# Patient Record
Sex: MALE | Race: WHITE | NOT HISPANIC OR LATINO
[De-identification: names, ages, dates, MRNs, and addresses within clinical notes are randomized per-mention and may not be internally consistent; named-entity substitution may affect disease eponyms.]

---

## 2017-09-28 ENCOUNTER — BH HISTORICAL (OUTPATIENT)
Dept: OTHER | Age: 62
End: 2017-09-28

## 2017-10-05 ENCOUNTER — BH HISTORICAL (OUTPATIENT)
Dept: OTHER | Age: 62
End: 2017-10-05

## 2017-10-16 ENCOUNTER — BH HISTORICAL (OUTPATIENT)
Dept: OTHER | Age: 62
End: 2017-10-16

## 2017-10-24 ENCOUNTER — BH HISTORICAL (OUTPATIENT)
Dept: OTHER | Age: 62
End: 2017-10-24

## 2017-11-02 ENCOUNTER — BH HISTORICAL (OUTPATIENT)
Dept: OTHER | Age: 62
End: 2017-11-02

## 2017-11-09 ENCOUNTER — BH HISTORICAL (OUTPATIENT)
Dept: OTHER | Age: 62
End: 2017-11-09

## 2017-11-16 ENCOUNTER — BH HISTORICAL (OUTPATIENT)
Dept: OTHER | Age: 62
End: 2017-11-16

## 2017-11-28 ENCOUNTER — BH HISTORICAL (OUTPATIENT)
Dept: OTHER | Age: 62
End: 2017-11-28

## 2017-12-14 ENCOUNTER — BH HISTORICAL (OUTPATIENT)
Dept: OTHER | Age: 62
End: 2017-12-14

## 2019-07-23 ENCOUNTER — OFFICE VISIT (OUTPATIENT)
Dept: INTEGRATIVE MEDICINE | Facility: CLINIC | Age: 64
End: 2019-07-23
Payer: COMMERCIAL

## 2019-07-23 VITALS
HEIGHT: 68 IN | SYSTOLIC BLOOD PRESSURE: 178 MMHG | WEIGHT: 225 LBS | BODY MASS INDEX: 34.1 KG/M2 | OXYGEN SATURATION: 98 % | HEART RATE: 84 BPM | DIASTOLIC BLOOD PRESSURE: 100 MMHG

## 2019-07-23 DIAGNOSIS — E11.9 TYPE 2 DIABETES MELLITUS WITHOUT COMPLICATION, WITHOUT LONG-TERM CURRENT USE OF INSULIN (HCC): ICD-10-CM

## 2019-07-23 DIAGNOSIS — I10 HYPERTENSION, ESSENTIAL: Primary | ICD-10-CM

## 2019-07-23 DIAGNOSIS — H53.9 VISION CHANGES: ICD-10-CM

## 2019-07-23 DIAGNOSIS — E78.2 MIXED HYPERLIPIDEMIA: ICD-10-CM

## 2019-07-23 DIAGNOSIS — K21.9 GASTROESOPHAGEAL REFLUX DISEASE, ESOPHAGITIS PRESENCE NOT SPECIFIED: ICD-10-CM

## 2019-07-23 DIAGNOSIS — H43.399 VITREOUS FLOATERS, UNSPECIFIED LATERALITY: ICD-10-CM

## 2019-07-23 DIAGNOSIS — F32.A DEPRESSION, UNSPECIFIED DEPRESSION TYPE: ICD-10-CM

## 2019-07-23 DIAGNOSIS — E66.9 OBESITY (BMI 30-39.9): ICD-10-CM

## 2019-07-23 PROCEDURE — 99205 OFFICE O/P NEW HI 60 MIN: CPT | Performed by: FAMILY MEDICINE

## 2019-07-23 RX ORDER — DAPAGLIFLOZIN 5 MG/1
5 TABLET, FILM COATED ORAL
Qty: 90 TABLET | Refills: 0 | Status: SHIPPED | OUTPATIENT
Start: 2019-07-23 | End: 2019-12-19

## 2019-07-23 RX ORDER — AMLODIPINE BESYLATE AND BENAZEPRIL HYDROCHLORIDE 5; 20 MG/1; MG/1
CAPSULE ORAL
Refills: 1 | COMMUNITY
Start: 2019-04-04 | End: 2019-07-23

## 2019-07-23 RX ORDER — METOPROLOL SUCCINATE 25 MG/1
25 TABLET, EXTENDED RELEASE ORAL DAILY
COMMUNITY
End: 2019-07-23

## 2019-07-23 RX ORDER — DAPAGLIFLOZIN 5 MG/1
TABLET, FILM COATED ORAL
Refills: 3 | COMMUNITY
Start: 2019-01-27 | End: 2019-07-23

## 2019-07-23 RX ORDER — METOPROLOL SUCCINATE 25 MG/1
25 TABLET, EXTENDED RELEASE ORAL DAILY
Qty: 90 TABLET | Refills: 0 | Status: SHIPPED | OUTPATIENT
Start: 2019-07-23 | End: 2019-10-08

## 2019-07-23 RX ORDER — GLIPIZIDE 10 MG/1
10 TABLET, FILM COATED, EXTENDED RELEASE ORAL DAILY
Qty: 90 TABLET | Refills: 0 | Status: SHIPPED | OUTPATIENT
Start: 2019-07-23 | End: 2019-10-08

## 2019-07-23 RX ORDER — SERTRALINE HYDROCHLORIDE 100 MG/1
150 TABLET, FILM COATED ORAL DAILY
Qty: 135 TABLET | Refills: 0 | Status: SHIPPED | OUTPATIENT
Start: 2019-07-23 | End: 2019-10-08

## 2019-07-23 RX ORDER — RANITIDINE 150 MG/1
150 TABLET ORAL 2 TIMES DAILY
COMMUNITY
End: 2019-07-23

## 2019-07-23 RX ORDER — BUPROPION HYDROCHLORIDE 150 MG/1
150 TABLET, EXTENDED RELEASE ORAL 2 TIMES DAILY
Qty: 180 TABLET | Refills: 0 | Status: SHIPPED | OUTPATIENT
Start: 2019-07-23 | End: 2019-10-08

## 2019-07-23 RX ORDER — SERTRALINE HYDROCHLORIDE 100 MG/1
100 TABLET, FILM COATED ORAL DAILY
COMMUNITY
End: 2019-07-23

## 2019-07-23 RX ORDER — RANITIDINE 150 MG/1
150 TABLET ORAL
Qty: 90 TABLET | Refills: 0 | Status: SHIPPED | OUTPATIENT
Start: 2019-07-23 | End: 2020-06-17

## 2019-07-23 RX ORDER — BUPROPION HYDROCHLORIDE 150 MG/1
150 TABLET, EXTENDED RELEASE ORAL 2 TIMES DAILY
COMMUNITY
End: 2019-07-23

## 2019-07-23 RX ORDER — AMLODIPINE BESYLATE AND BENAZEPRIL HYDROCHLORIDE 5; 20 MG/1; MG/1
1 CAPSULE ORAL DAILY
Qty: 90 CAPSULE | Refills: 1 | Status: SHIPPED | OUTPATIENT
Start: 2019-07-23 | End: 2019-10-08

## 2019-07-23 RX ORDER — ROSUVASTATIN CALCIUM 20 MG/1
20 TABLET, COATED ORAL NIGHTLY
COMMUNITY
End: 2019-07-23

## 2019-07-23 RX ORDER — GLIPIZIDE 10 MG/1
10 TABLET, FILM COATED, EXTENDED RELEASE ORAL DAILY
COMMUNITY
End: 2019-07-23

## 2019-07-23 RX ORDER — ROSUVASTATIN CALCIUM 20 MG/1
20 TABLET, COATED ORAL NIGHTLY
Qty: 90 TABLET | Refills: 0 | Status: SHIPPED | OUTPATIENT
Start: 2019-07-23 | End: 2019-10-08

## 2019-07-23 NOTE — PROGRESS NOTES
Shoaib Gramajo is a 62year old male. Patient presents with: Integrative Medicine Consult      HPI:     Retired for the past 2 years  Does gardening, traveling  Having more depression recently, has stress with his marriage, his wife is sick with MS.     Ra buPROPion HCl ER, SR, 150 MG Oral Tablet 12 Hr Take 1 tablet (150 mg total) by mouth 2 (two) times daily. Disp: 180 tablet Rfl: 0   Sertraline HCl 100 MG Oral Tab Take 1.5 tablets (150 mg total) by mouth daily.  Disp: 135 tablet Rfl: 0   Rosuvastatin Calciu Forced sexual activity: Not on file    Other Topics      Concerns:        Not on file    Social History Narrative      Retired            Daughter - Dior      SURGICAL HISTORY:     Past Surgical History:   Procedure Laterality Date   • Cath Given further recommendations as below    Orders Placed This Visit:  No orders of the defined types were placed in this encounter.     Orders Placed This Encounter      **MEDICAL NUTRITIONAL THERAPY ([place] Location) - INTERNAL REFERRAL**      [facility]

## 2019-10-08 ENCOUNTER — OFFICE VISIT (OUTPATIENT)
Dept: INTEGRATIVE MEDICINE | Facility: CLINIC | Age: 64
End: 2019-10-08
Payer: COMMERCIAL

## 2019-10-08 ENCOUNTER — LAB ENCOUNTER (OUTPATIENT)
Dept: LAB | Facility: REFERENCE LAB | Age: 64
End: 2019-10-08
Attending: FAMILY MEDICINE
Payer: COMMERCIAL

## 2019-10-08 VITALS
OXYGEN SATURATION: 97 % | WEIGHT: 223.63 LBS | SYSTOLIC BLOOD PRESSURE: 122 MMHG | DIASTOLIC BLOOD PRESSURE: 76 MMHG | HEIGHT: 68 IN | HEART RATE: 74 BPM | BODY MASS INDEX: 33.89 KG/M2

## 2019-10-08 DIAGNOSIS — Z00.00 ROUTINE MEDICAL EXAM: ICD-10-CM

## 2019-10-08 DIAGNOSIS — K21.9 GASTROESOPHAGEAL REFLUX DISEASE, ESOPHAGITIS PRESENCE NOT SPECIFIED: ICD-10-CM

## 2019-10-08 DIAGNOSIS — E78.2 MIXED HYPERLIPIDEMIA: ICD-10-CM

## 2019-10-08 DIAGNOSIS — F32.A DEPRESSION, UNSPECIFIED DEPRESSION TYPE: ICD-10-CM

## 2019-10-08 DIAGNOSIS — L81.9 ATYPICAL PIGMENTED SKIN LESION: ICD-10-CM

## 2019-10-08 DIAGNOSIS — Z99.89 OSA ON CPAP: ICD-10-CM

## 2019-10-08 DIAGNOSIS — E66.9 OBESITY (BMI 30-39.9): ICD-10-CM

## 2019-10-08 DIAGNOSIS — E11.9 TYPE 2 DIABETES MELLITUS WITHOUT COMPLICATION, WITHOUT LONG-TERM CURRENT USE OF INSULIN (HCC): ICD-10-CM

## 2019-10-08 DIAGNOSIS — Z12.11 SCREEN FOR COLON CANCER: ICD-10-CM

## 2019-10-08 DIAGNOSIS — Z00.00 ROUTINE MEDICAL EXAM: Primary | ICD-10-CM

## 2019-10-08 DIAGNOSIS — I10 HYPERTENSION, ESSENTIAL: ICD-10-CM

## 2019-10-08 DIAGNOSIS — G47.33 OSA ON CPAP: ICD-10-CM

## 2019-10-08 PROBLEM — Z79.82 LONG TERM CURRENT USE OF ASPIRIN: Status: ACTIVE | Noted: 2019-01-21

## 2019-10-08 PROBLEM — K63.5 POLYP OF COLON: Status: ACTIVE | Noted: 2019-01-21

## 2019-10-08 PROBLEM — L57.0 ACTINIC KERATOSIS: Status: ACTIVE | Noted: 2019-01-21

## 2019-10-08 PROBLEM — I25.9 CHRONIC ISCHEMIC HEART DISEASE: Status: ACTIVE | Noted: 2019-01-21

## 2019-10-08 PROBLEM — F41.9 ANXIETY: Status: ACTIVE | Noted: 2019-01-21

## 2019-10-08 PROBLEM — F10.11 NONDEPENDENT ALCOHOL ABUSE, IN REMISSION: Status: ACTIVE | Noted: 2019-01-21

## 2019-10-08 PROCEDURE — 90471 IMMUNIZATION ADMIN: CPT | Performed by: FAMILY MEDICINE

## 2019-10-08 PROCEDURE — 36415 COLL VENOUS BLD VENIPUNCTURE: CPT

## 2019-10-08 PROCEDURE — 99396 PREV VISIT EST AGE 40-64: CPT | Performed by: FAMILY MEDICINE

## 2019-10-08 PROCEDURE — 80061 LIPID PANEL: CPT

## 2019-10-08 PROCEDURE — 84153 ASSAY OF PSA TOTAL: CPT

## 2019-10-08 PROCEDURE — 83036 HEMOGLOBIN GLYCOSYLATED A1C: CPT

## 2019-10-08 PROCEDURE — 83735 ASSAY OF MAGNESIUM: CPT

## 2019-10-08 PROCEDURE — 80053 COMPREHEN METABOLIC PANEL: CPT

## 2019-10-08 PROCEDURE — 84154 ASSAY OF PSA FREE: CPT

## 2019-10-08 PROCEDURE — 82306 VITAMIN D 25 HYDROXY: CPT

## 2019-10-08 PROCEDURE — 84443 ASSAY THYROID STIM HORMONE: CPT

## 2019-10-08 PROCEDURE — 90686 IIV4 VACC NO PRSV 0.5 ML IM: CPT | Performed by: FAMILY MEDICINE

## 2019-10-08 PROCEDURE — 85025 COMPLETE CBC W/AUTO DIFF WBC: CPT

## 2019-10-08 RX ORDER — BUPROPION HYDROCHLORIDE 150 MG/1
150 TABLET, EXTENDED RELEASE ORAL 2 TIMES DAILY
Qty: 180 TABLET | Refills: 1 | Status: SHIPPED | OUTPATIENT
Start: 2019-10-08 | End: 2020-03-23

## 2019-10-08 RX ORDER — ROSUVASTATIN CALCIUM 20 MG/1
20 TABLET, COATED ORAL NIGHTLY
Qty: 90 TABLET | Refills: 1 | Status: SHIPPED | OUTPATIENT
Start: 2019-10-08 | End: 2019-12-09

## 2019-10-08 RX ORDER — FENOFIBRATE 145 MG/1
145 TABLET, COATED ORAL DAILY
Qty: 90 TABLET | Refills: 1 | Status: SHIPPED | OUTPATIENT
Start: 2019-10-08 | End: 2019-10-19

## 2019-10-08 RX ORDER — SERTRALINE HYDROCHLORIDE 100 MG/1
150 TABLET, FILM COATED ORAL DAILY
Qty: 135 TABLET | Refills: 1 | Status: SHIPPED | OUTPATIENT
Start: 2019-10-08 | End: 2020-06-17

## 2019-10-08 RX ORDER — METOPROLOL SUCCINATE 25 MG/1
25 TABLET, EXTENDED RELEASE ORAL DAILY
Qty: 90 TABLET | Refills: 1 | Status: SHIPPED | OUTPATIENT
Start: 2019-10-08 | End: 2020-06-17

## 2019-10-08 RX ORDER — AMLODIPINE BESYLATE AND BENAZEPRIL HYDROCHLORIDE 5; 20 MG/1; MG/1
1 CAPSULE ORAL DAILY
Qty: 90 CAPSULE | Refills: 1 | Status: SHIPPED | OUTPATIENT
Start: 2019-10-08 | End: 2019-12-09

## 2019-10-08 RX ORDER — GLIPIZIDE 10 MG/1
10 TABLET, FILM COATED, EXTENDED RELEASE ORAL DAILY
Qty: 90 TABLET | Refills: 1 | Status: SHIPPED | OUTPATIENT
Start: 2019-10-08 | End: 2020-03-23

## 2019-10-08 NOTE — PROGRESS NOTES
Pritesh Hermosillo is a 62year old male. Patient presents with:  Physical: Rx refills - needs script printed      HPI:     Lost 2 lbs since his last visit. Exercising more, doing more walking. Has a gym membership.    Diet - eating more salads, having mo 08/31/2017, 09/25/2018   • Pneumovax 23 08/26/2009, 02/13/2013   • TDAP 06/21/2018       MEDICAL HISTORY:     Past Medical History:   Diagnosis Date   • Asthma    • Depression    • Diabetes ([facility]    • High blood pressure    • Recovering alcoholic ([facility] Financial resource strain: Not on file      Food insecurity:        Worry: Not on file        Inability: Not on file      Transportation needs:        Medical: Not on file        Non-medical: Not on file    Tobacco Use      Smoking status: Never Smoker thyromegaly present. Cardiovascular: Normal rate, regular rhythm, normal heart sounds and intact distal pulses. Pulmonary/Chest: Effort normal and breath sounds normal. No respiratory distress. He has no wheezes. He exhibits no tenderness.    Abdominal: months (around 4/8/2020) for Follow Up (15 min).     Discussed with Patient the Following:  Healthy diet including adequate intake of vegetables and fruits, appropriate portion sizes, minimizing highly concentrated carbohydrate foods    Patient affirmed und

## 2019-10-19 RX ORDER — FENOFIBRATE 145 MG/1
145 TABLET, COATED ORAL DAILY
Qty: 90 TABLET | Refills: 1 | Status: SHIPPED | OUTPATIENT
Start: 2019-10-19 | End: 2020-07-10

## 2019-10-31 NOTE — TELEPHONE ENCOUNTER
A refill request was received for:  Requested Prescriptions      No prescriptions requested or ordered in this encounter     Last refill date: 07/23/2019  Qty:10 pen  Last office visit: 10/18/2019  When is follow up due:04/08/2020     No future appointment

## 2019-12-09 ENCOUNTER — OFFICE VISIT (OUTPATIENT)
Dept: INTEGRATIVE MEDICINE | Facility: CLINIC | Age: 64
End: 2019-12-09
Payer: COMMERCIAL

## 2019-12-09 VITALS
SYSTOLIC BLOOD PRESSURE: 138 MMHG | BODY MASS INDEX: 32.28 KG/M2 | WEIGHT: 213 LBS | DIASTOLIC BLOOD PRESSURE: 82 MMHG | HEART RATE: 81 BPM | OXYGEN SATURATION: 97 % | TEMPERATURE: 98 F | HEIGHT: 68 IN

## 2019-12-09 DIAGNOSIS — E78.2 MIXED HYPERLIPIDEMIA: ICD-10-CM

## 2019-12-09 DIAGNOSIS — N41.0 ACUTE PROSTATITIS: Primary | ICD-10-CM

## 2019-12-09 DIAGNOSIS — R30.9 PAINFUL URINATION: ICD-10-CM

## 2019-12-09 DIAGNOSIS — I10 HYPERTENSION, ESSENTIAL: ICD-10-CM

## 2019-12-09 PROCEDURE — 99213 OFFICE O/P EST LOW 20 MIN: CPT | Performed by: PHYSICIAN ASSISTANT

## 2019-12-09 PROCEDURE — 87186 SC STD MICRODIL/AGAR DIL: CPT | Performed by: PHYSICIAN ASSISTANT

## 2019-12-09 PROCEDURE — 87086 URINE CULTURE/COLONY COUNT: CPT | Performed by: PHYSICIAN ASSISTANT

## 2019-12-09 PROCEDURE — 81002 URINALYSIS NONAUTO W/O SCOPE: CPT | Performed by: PHYSICIAN ASSISTANT

## 2019-12-09 PROCEDURE — 87077 CULTURE AEROBIC IDENTIFY: CPT | Performed by: PHYSICIAN ASSISTANT

## 2019-12-09 RX ORDER — ROSUVASTATIN CALCIUM 20 MG/1
20 TABLET, COATED ORAL NIGHTLY
Qty: 90 TABLET | Refills: 1 | Status: SHIPPED | OUTPATIENT
Start: 2019-12-09 | End: 2020-06-17

## 2019-12-09 RX ORDER — PREDNISONE 20 MG/1
20 TABLET ORAL DAILY
Qty: 5 TABLET | Refills: 0 | Status: SHIPPED | OUTPATIENT
Start: 2019-12-09 | End: 2019-12-14

## 2019-12-09 RX ORDER — AMLODIPINE BESYLATE AND BENAZEPRIL HYDROCHLORIDE 5; 20 MG/1; MG/1
1 CAPSULE ORAL DAILY
Qty: 90 CAPSULE | Refills: 1 | Status: SHIPPED | OUTPATIENT
Start: 2019-12-09 | End: 2020-03-17

## 2019-12-09 RX ORDER — CIPROFLOXACIN 500 MG/1
500 TABLET, FILM COATED ORAL 2 TIMES DAILY
Qty: 16 TABLET | Refills: 0 | Status: SHIPPED | OUTPATIENT
Start: 2019-12-09 | End: 2019-12-17

## 2019-12-09 NOTE — PATIENT INSTRUCTIONS
Check blood sugar the next few days. Take cipro for the next 8 days. Take this twice a day. Let us know if things are not improving in the next 2-3 days.

## 2019-12-09 NOTE — PROGRESS NOTES
Yeni Gee is a 62year old male. Patient presents with: Incontinence: x 1 week, discomfort with urination   Groin Pain: x 1 week    Fatigue      HPI:   Frequent urination especially at night. Not producing much urine. Some urgency.  Has had chills Oral Tab Take 1 tablet (500 mg total) by mouth 2 (two) times daily for 8 days. 16 tablet 0   • predniSONE 20 MG Oral Tab Take 1 tablet (20 mg total) by mouth daily for 5 days.  5 tablet 0   • Semaglutide, 1 MG/DOSE, (OZEMPIC, 1 MG/DOSE,) 2 MG/1.5ML Subcutan activity:        Days per week: Not on file        Minutes per session: Not on file      Stress: Not on file    Relationships      Social connections:        Talks on phone: Not on file        Gets together: Not on file        Attends Yarsani service: No URINALYSIS NONAUTO W/O SCOPE  - URINE CULTURE, ROUTINE; Future  - URINE CULTURE, ROUTINE    2. Hypertension, essential  - amLODIPine Besy-Benazepril HCl 5-20 MG Oral Cap; Take 1 capsule by mouth daily. Dispense: 90 capsule; Refill: 1    3.  Mixed hyperlipi

## 2019-12-17 ENCOUNTER — TELEPHONE (OUTPATIENT)
Dept: INTEGRATIVE MEDICINE | Facility: CLINIC | Age: 64
End: 2019-12-17

## 2019-12-17 DIAGNOSIS — N41.0 ACUTE PROSTATITIS: Primary | ICD-10-CM

## 2019-12-17 NOTE — TELEPHONE ENCOUNTER
Pt called and stated that his prostrate symptoms have worsened, Patient is having scrotal pain and keeping him up all night. Patient has appt with Dr Jude Haley this Thurs afternoon. Patient is having pain with BM and is finishing his ABX today.  Any advice bef

## 2019-12-18 RX ORDER — SULFAMETHOXAZOLE AND TRIMETHOPRIM 800; 160 MG/1; MG/1
1 TABLET ORAL 2 TIMES DAILY
Qty: 10 TABLET | Refills: 0 | Status: SHIPPED | OUTPATIENT
Start: 2019-12-18 | End: 2019-12-19

## 2019-12-18 NOTE — TELEPHONE ENCOUNTER
When he started the abx and prednisone he was having less pain and more ease of urination. Then was having pain, unable to have a BM. Over yesterday and today symptoms have improved. Stopped the abx yesterday. Still with urinary frequency.      Give

## 2019-12-19 ENCOUNTER — OFFICE VISIT (OUTPATIENT)
Dept: INTEGRATIVE MEDICINE | Facility: CLINIC | Age: 64
End: 2019-12-19
Payer: COMMERCIAL

## 2019-12-19 VITALS
SYSTOLIC BLOOD PRESSURE: 116 MMHG | OXYGEN SATURATION: 96 % | HEIGHT: 68 IN | DIASTOLIC BLOOD PRESSURE: 78 MMHG | WEIGHT: 208.38 LBS | HEART RATE: 72 BPM | BODY MASS INDEX: 31.58 KG/M2

## 2019-12-19 DIAGNOSIS — E66.9 OBESITY (BMI 30-39.9): ICD-10-CM

## 2019-12-19 DIAGNOSIS — N41.0 ACUTE PROSTATITIS: Primary | ICD-10-CM

## 2019-12-19 DIAGNOSIS — I10 HYPERTENSION, ESSENTIAL: ICD-10-CM

## 2019-12-19 DIAGNOSIS — E11.9 TYPE 2 DIABETES MELLITUS WITHOUT COMPLICATION, WITHOUT LONG-TERM CURRENT USE OF INSULIN (HCC): ICD-10-CM

## 2019-12-19 PROCEDURE — 99213 OFFICE O/P EST LOW 20 MIN: CPT | Performed by: FAMILY MEDICINE

## 2019-12-19 RX ORDER — SULFAMETHOXAZOLE AND TRIMETHOPRIM 800; 160 MG/1; MG/1
1 TABLET ORAL 2 TIMES DAILY
Qty: 14 TABLET | Refills: 0 | Status: SHIPPED | OUTPATIENT
Start: 2019-12-19 | End: 2020-03-17

## 2019-12-19 RX ORDER — SULFAMETHOXAZOLE AND TRIMETHOPRIM 800; 160 MG/1; MG/1
1 TABLET ORAL 2 TIMES DAILY
COMMUNITY
End: 2019-12-19

## 2019-12-19 NOTE — PATIENT INSTRUCTIONS
I have complete iris in the body's ability to heal and transform. The products and items listed below (the “Products”)  and their claims have not been evaluated by the Food and Drug Administration.  Dietary products are not intended to treat, prevent, m

## 2019-12-19 NOTE — PROGRESS NOTES
Ricardo Gomes is a 62year old male. Patient presents with: Follow - Up: 10 days - states it's feeling better      HPI:     Before felt like he was sitting on the golf ball and was urinating every 2 hrs. Had to give himself an enema to have a small BM. • metFORMIN HCl 1000 MG Oral Tab Take 1 tablet (1,000 mg total) by mouth 2 (two) times daily with meals. 180 tablet 1   • glipiZIDE ER 10 MG Oral Tablet 24 Hr Take 1 tablet (10 mg total) by mouth daily.  90 tablet 1   • buPROPion HCl ER, SR, 150 MG Oral Tab Fear of current or ex partner: Not on file        Emotionally abused: Not on file        Physically abused: Not on file        Forced sexual activity: Not on file    Other Topics      Concerns:        Not on file    Social History Narrative      Reti The products and items listed below (the “Products”)  and their claims have not been evaluated by the Food and Drug Administration. Dietary products are not intended to treat, prevent, mitigate or cure disease.  Ultimately, you must draw your own conclusion None

## 2020-03-17 ENCOUNTER — APPOINTMENT (OUTPATIENT)
Dept: LAB | Facility: HOSPITAL | Age: 65
End: 2020-03-17
Attending: FAMILY MEDICINE
Payer: COMMERCIAL

## 2020-03-17 ENCOUNTER — OFFICE VISIT (OUTPATIENT)
Dept: INTEGRATIVE MEDICINE | Facility: CLINIC | Age: 65
End: 2020-03-17
Payer: COMMERCIAL

## 2020-03-17 VITALS
BODY MASS INDEX: 35.01 KG/M2 | WEIGHT: 231 LBS | SYSTOLIC BLOOD PRESSURE: 154 MMHG | OXYGEN SATURATION: 99 % | HEART RATE: 60 BPM | DIASTOLIC BLOOD PRESSURE: 84 MMHG | HEIGHT: 68 IN

## 2020-03-17 DIAGNOSIS — E78.2 MIXED HYPERLIPIDEMIA: ICD-10-CM

## 2020-03-17 DIAGNOSIS — E11.9 TYPE 2 DIABETES MELLITUS WITHOUT COMPLICATION, WITHOUT LONG-TERM CURRENT USE OF INSULIN (HCC): ICD-10-CM

## 2020-03-17 DIAGNOSIS — E66.9 OBESITY (BMI 30-39.9): ICD-10-CM

## 2020-03-17 DIAGNOSIS — I10 HYPERTENSION, ESSENTIAL: ICD-10-CM

## 2020-03-17 DIAGNOSIS — E11.9 TYPE 2 DIABETES MELLITUS WITHOUT COMPLICATION, WITHOUT LONG-TERM CURRENT USE OF INSULIN (HCC): Primary | ICD-10-CM

## 2020-03-17 PROBLEM — R94.39 OTHER NONSPECIFIC ABNORMAL CARDIOVASCULAR SYSTEM FUNCTION STUDY: Status: ACTIVE | Noted: 2020-03-17

## 2020-03-17 PROBLEM — I20.8: Status: ACTIVE | Noted: 2020-03-17

## 2020-03-17 PROBLEM — I20.89: Status: ACTIVE | Noted: 2020-03-17

## 2020-03-17 PROBLEM — E78.00 PURE HYPERCHOLESTEROLEMIA: Status: ACTIVE | Noted: 2020-03-17

## 2020-03-17 LAB
CHOLEST SMN-MCNC: 114 MG/DL (ref ?–200)
EST. AVERAGE GLUCOSE BLD GHB EST-MCNC: 146 MG/DL (ref 68–126)
HBA1C MFR BLD HPLC: 6.7 % (ref ?–5.7)
HDLC SERPL-MCNC: 31 MG/DL (ref 40–59)
LDLC SERPL CALC-MCNC: 70 MG/DL (ref ?–100)
NONHDLC SERPL-MCNC: 83 MG/DL (ref ?–130)
PATIENT FASTING Y/N/NP: YES
TRIGL SERPL-MCNC: 65 MG/DL (ref 30–149)
VLDLC SERPL CALC-MCNC: 13 MG/DL (ref 0–30)

## 2020-03-17 PROCEDURE — 80061 LIPID PANEL: CPT

## 2020-03-17 PROCEDURE — 36415 COLL VENOUS BLD VENIPUNCTURE: CPT

## 2020-03-17 PROCEDURE — 83036 HEMOGLOBIN GLYCOSYLATED A1C: CPT

## 2020-03-17 PROCEDURE — 99214 OFFICE O/P EST MOD 30 MIN: CPT | Performed by: FAMILY MEDICINE

## 2020-03-17 RX ORDER — AMLODIPINE BESYLATE AND BENAZEPRIL HYDROCHLORIDE 10; 40 MG/1; MG/1
1 CAPSULE ORAL DAILY
Qty: 90 CAPSULE | Refills: 0 | Status: SHIPPED | OUTPATIENT
Start: 2020-03-17 | End: 2020-06-15

## 2020-03-17 NOTE — PROGRESS NOTES
Willie Frank is a 60year old male. Patient presents with: Integrative Medicine Consult: 3 mo f/u      HPI:     Recently got back from 10 weeks of vacation. Has not been following his diet well. Gained about 23 lbs since his last visit with me.    E MG Oral Tab Take 81 mg by mouth daily. • raNITIdine HCl 150 MG Oral Tab Take 1 tablet (150 mg total) by mouth daily as needed for Heartburn.  90 tablet 0       SOCIAL HISTORY:   Social History    Socioeconomic History      Marital status:       S Constitutional: He is oriented to person, place, and time and well-developed, well-nourished, and in no distress. HENT:   Head: Normocephalic and atraumatic. Eyes: Pupils are equal, round, and reactive to light.  Conjunctivae and EOM are normal.   Nec conditions. The patient agrees that the [facility] and its affiliates and its [facility] are not liable for the patients use of the Products.  [facility] makes no representations or warranties of any kind, expressed or impl to three minutes after the first reading, and then take another to check accuracy. If your monitor doesn't automatically log blood pressure readings or heart rates, write them down.     Dealing with Stress and Anxiety    · Set aside 5 min throughout your da seconds, then out through your nose for 6 seconds. Create a cycle with your breathing, where the end of the inhalation is the beginning of the exhalation without pause. From Prasanna Candelaria: “Breathing in, I calm my body. Breathing out, I smile.  [employer] online:  https://www.dawoodsellpoints.org/. com/DownloadMeditations. html  [employer].pt. com  http://[email or web address]/body. cfm?id=22  http://www. [employer]/ccl/guided-meditations  http://www. [employer]/cms/free-audio-meditations  Philip.

## 2020-03-17 NOTE — PATIENT INSTRUCTIONS
I have complete iris in the body's ability to heal and transform. The products and items listed below (the “Products”)  and their claims have not been evaluated by the Food and Drug Administration.  Dietary products are not intended to treat, prevent, m your back supported against a chair. Try to be calm and not think about stressful things. Don't talk while taking your blood pressure. - Make sure your arm is positioned properly. Always use the same arm when taking your blood pressure.  Rest your arm, vides bead or sasha). · Gratitude can instill a sense of peace. Keep a gratitude journal and write down at least 5 things (or more!) that you are thankful for everyday. Life never seems as bad when you realize how much you have to be thankful for.    · Focus on making a whoosh sound to a count of 8. This is one breath. Now inhale again and repeat the cycle three more times for a total of four breaths. ARE THE NUMBERS IMPORTANT?    The absolute time you spend on each phase is not important; the ratio of 4:7:

## 2020-03-23 RX ORDER — GLIPIZIDE 10 MG/1
TABLET, FILM COATED, EXTENDED RELEASE ORAL
Qty: 90 TABLET | Refills: 0 | Status: SHIPPED | OUTPATIENT
Start: 2020-03-23 | End: 2020-06-17

## 2020-03-23 RX ORDER — BUPROPION HYDROCHLORIDE 150 MG/1
150 TABLET, EXTENDED RELEASE ORAL 2 TIMES DAILY
Qty: 180 TABLET | Refills: 0 | Status: SHIPPED | OUTPATIENT
Start: 2020-03-23 | End: 2020-05-05

## 2020-03-23 NOTE — TELEPHONE ENCOUNTER
A refill request was received for:  Requested Prescriptions      No prescriptions requested or ordered in this encounter     Last refill date: 10/08/2019  Qty:180 tabs 1 refill   Last office visit: 03/17/2020  When is follow up due: 06/17/2020    Future Ap

## 2020-05-05 RX ORDER — BUPROPION HYDROCHLORIDE 150 MG/1
150 TABLET, EXTENDED RELEASE ORAL 2 TIMES DAILY
Qty: 180 TABLET | Refills: 0 | Status: SHIPPED | OUTPATIENT
Start: 2020-05-05 | End: 2020-06-17

## 2020-05-05 NOTE — TELEPHONE ENCOUNTER
A refill request was received for:  Requested Prescriptions      No prescriptions requested or ordered in this encounter     Last refill date: 03/23/2020  Qty:180 tabs   Last office visit: 03/17/2020  When is follow up due: 06/17/2020    Future Appointment

## 2020-06-15 RX ORDER — AMLODIPINE BESYLATE AND BENAZEPRIL HYDROCHLORIDE 10; 40 MG/1; MG/1
1 CAPSULE ORAL DAILY
Qty: 90 CAPSULE | Refills: 0 | Status: SHIPPED | OUTPATIENT
Start: 2020-06-15 | End: 2020-06-17

## 2020-06-15 NOTE — TELEPHONE ENCOUNTER
A refill request was received for:  Requested Prescriptions     Pending Prescriptions Disp Refills   • amLODIPine Besy-Benazepril HCl 10-40 MG Oral Cap 90 capsule 0     Sig: Take 1 capsule by mouth daily.      Last refill date: 03/17/2020  Qty:90  Last offi

## 2020-06-17 ENCOUNTER — OFFICE VISIT (OUTPATIENT)
Dept: INTEGRATIVE MEDICINE | Facility: CLINIC | Age: 65
End: 2020-06-17
Payer: COMMERCIAL

## 2020-06-17 VITALS
WEIGHT: 225.38 LBS | HEIGHT: 68 IN | TEMPERATURE: 98 F | HEART RATE: 68 BPM | BODY MASS INDEX: 34.16 KG/M2 | DIASTOLIC BLOOD PRESSURE: 80 MMHG | SYSTOLIC BLOOD PRESSURE: 142 MMHG

## 2020-06-17 DIAGNOSIS — E78.2 MIXED HYPERLIPIDEMIA: ICD-10-CM

## 2020-06-17 DIAGNOSIS — F32.A DEPRESSION, UNSPECIFIED DEPRESSION TYPE: ICD-10-CM

## 2020-06-17 DIAGNOSIS — E11.9 TYPE 2 DIABETES MELLITUS WITHOUT COMPLICATION, WITHOUT LONG-TERM CURRENT USE OF INSULIN (HCC): Primary | ICD-10-CM

## 2020-06-17 DIAGNOSIS — I10 HYPERTENSION, ESSENTIAL: ICD-10-CM

## 2020-06-17 DIAGNOSIS — K21.9 GASTROESOPHAGEAL REFLUX DISEASE, ESOPHAGITIS PRESENCE NOT SPECIFIED: ICD-10-CM

## 2020-06-17 DIAGNOSIS — Z99.89 OSA ON CPAP: ICD-10-CM

## 2020-06-17 DIAGNOSIS — E66.9 OBESITY (BMI 30-39.9): ICD-10-CM

## 2020-06-17 DIAGNOSIS — G47.33 OSA ON CPAP: ICD-10-CM

## 2020-06-17 PROBLEM — R94.39 OTHER NONSPECIFIC ABNORMAL CARDIOVASCULAR SYSTEM FUNCTION STUDY: Status: RESOLVED | Noted: 2020-03-17 | Resolved: 2020-06-17

## 2020-06-17 PROBLEM — I20.8: Status: RESOLVED | Noted: 2020-03-17 | Resolved: 2020-06-17

## 2020-06-17 PROBLEM — I20.89: Status: RESOLVED | Noted: 2020-03-17 | Resolved: 2020-06-17

## 2020-06-17 PROCEDURE — 99214 OFFICE O/P EST MOD 30 MIN: CPT | Performed by: FAMILY MEDICINE

## 2020-06-17 RX ORDER — BUPROPION HYDROCHLORIDE 150 MG/1
150 TABLET, EXTENDED RELEASE ORAL 2 TIMES DAILY
Qty: 180 TABLET | Refills: 0 | Status: SHIPPED | OUTPATIENT
Start: 2020-06-17 | End: 2020-10-05

## 2020-06-17 RX ORDER — AMLODIPINE BESYLATE AND BENAZEPRIL HYDROCHLORIDE 10; 40 MG/1; MG/1
1 CAPSULE ORAL DAILY
Qty: 90 CAPSULE | Refills: 0 | Status: SHIPPED | OUTPATIENT
Start: 2020-06-17 | End: 2020-09-14

## 2020-06-17 RX ORDER — ALBUTEROL SULFATE 90 UG/1
2 AEROSOL, METERED RESPIRATORY (INHALATION) EVERY 4 HOURS PRN
Qty: 1 INHALER | Refills: 2 | Status: SHIPPED | OUTPATIENT
Start: 2020-06-17

## 2020-06-17 RX ORDER — METOPROLOL SUCCINATE 25 MG/1
25 TABLET, EXTENDED RELEASE ORAL DAILY
Qty: 90 TABLET | Refills: 1 | Status: SHIPPED | OUTPATIENT
Start: 2020-06-17 | End: 2021-01-08

## 2020-06-17 RX ORDER — ROSUVASTATIN CALCIUM 20 MG/1
20 TABLET, COATED ORAL NIGHTLY
Qty: 90 TABLET | Refills: 1 | Status: SHIPPED | OUTPATIENT
Start: 2020-06-17 | End: 2021-01-08

## 2020-06-17 RX ORDER — SERTRALINE HYDROCHLORIDE 100 MG/1
150 TABLET, FILM COATED ORAL DAILY
Qty: 135 TABLET | Refills: 1 | Status: SHIPPED | OUTPATIENT
Start: 2020-06-17 | End: 2020-12-30

## 2020-06-17 RX ORDER — GLIPIZIDE 10 MG/1
10 TABLET, FILM COATED, EXTENDED RELEASE ORAL DAILY
Qty: 90 TABLET | Refills: 0 | Status: SHIPPED | OUTPATIENT
Start: 2020-06-17 | End: 2020-09-14

## 2020-06-17 RX ORDER — CIMETIDINE 400 MG/1
400 TABLET, FILM COATED ORAL 2 TIMES DAILY
Qty: 180 TABLET | Refills: 1 | Status: SHIPPED | OUTPATIENT
Start: 2020-06-17 | End: 2020-06-18

## 2020-06-17 NOTE — PATIENT INSTRUCTIONS
I have complete iris in the body's ability to heal and transform. The products and items listed below (the “Products”)  and their claims have not been evaluated by the Food and Drug Administration.  Dietary products are not intended to treat, prevent, m - Don't measure your blood pressure right after you wake up. You can prepare for the day, but don't eat breakfast or take medications before measuring your blood pressure.  If you exercise after waking, take your blood pressure before exercising.  - Avoid f

## 2020-06-17 NOTE — PROGRESS NOTES
Leidy Clay is a 60year old male. Patient presents with: Follow - Up: follow up on diabetes medication       HPI:     Quarantining - not going out to the stores often. Doing a lot of gardening, not walking as much as he could.    Having more knee p • metFORMIN HCl 1000 MG Oral Tab Take 1 tablet (1,000 mg total) by mouth 2 (two) times daily with meals. 180 tablet 1   • Sertraline HCl 100 MG Oral Tab Take 1.5 tablets (150 mg total) by mouth daily.  135 tablet 1   • Metoprolol Succinate ER 25 MG Oral Tab Physically abused: Not on file        Forced sexual activity: Not on file    Other Topics      Concerns:        Not on file    Social History Narrative      Retired       - wife has MS      Daughter - Angélica White      Dad - aged 81 yo (6/2020) No orders of the defined types were placed in this encounter. Patient Instructions   I have complete iris in the body's ability to heal and transform.     The products and items listed below (the “Products”)  and their claims have not been evaluated b Goal blood pressure is <140/90    Tips  - Don't measure your blood pressure right after you wake up. You can prepare for the day, but don't eat breakfast or take medications before measuring your blood pressure.  If you exercise after waking, take your bloo

## 2020-06-18 ENCOUNTER — TELEPHONE (OUTPATIENT)
Dept: INTEGRATIVE MEDICINE | Facility: CLINIC | Age: 65
End: 2020-06-18

## 2020-06-18 RX ORDER — FAMOTIDINE 20 MG/1
20 TABLET ORAL 2 TIMES DAILY
Qty: 180 TABLET | Refills: 1 | Status: SHIPPED | OUTPATIENT
Start: 2020-06-18 | End: 2021-01-08

## 2020-07-09 ENCOUNTER — TELEPHONE (OUTPATIENT)
Dept: INTEGRATIVE MEDICINE | Facility: CLINIC | Age: 65
End: 2020-07-09

## 2020-07-09 DIAGNOSIS — E78.2 MIXED HYPERLIPIDEMIA: Primary | ICD-10-CM

## 2020-07-10 RX ORDER — FENOFIBRATE 145 MG/1
145 TABLET, COATED ORAL DAILY
Qty: 90 TABLET | Refills: 1 | Status: SHIPPED | OUTPATIENT
Start: 2020-07-10 | End: 2021-01-08

## 2020-09-14 RX ORDER — GLIPIZIDE 10 MG/1
TABLET, FILM COATED, EXTENDED RELEASE ORAL
Qty: 90 TABLET | Refills: 0 | Status: SHIPPED | OUTPATIENT
Start: 2020-09-14 | End: 2020-12-17

## 2020-09-14 RX ORDER — AMLODIPINE BESYLATE AND BENAZEPRIL HYDROCHLORIDE 10; 40 MG/1; MG/1
CAPSULE ORAL
Qty: 90 CAPSULE | Refills: 0 | Status: SHIPPED | OUTPATIENT
Start: 2020-09-14 | End: 2021-01-08

## 2020-09-14 NOTE — TELEPHONE ENCOUNTER
A refill request was received for:  Requested Prescriptions     Pending Prescriptions Disp Refills   • AMLODIPINE BESY-BENAZEPRIL HCL 10-40 MG Oral Cap [Pharmacy Med Name: AMLODIPINE-BENAZEPRIL 10-40 MG] 90 capsule 0     Sig: TAKE 1 CAPSULE BY MOUTH EVERY

## 2020-10-05 RX ORDER — BUPROPION HYDROCHLORIDE 150 MG/1
TABLET, EXTENDED RELEASE ORAL
Qty: 180 TABLET | Refills: 0 | Status: SHIPPED | OUTPATIENT
Start: 2020-10-05 | End: 2021-12-16

## 2020-10-05 NOTE — TELEPHONE ENCOUNTER
A refill request was received for:  Requested Prescriptions     Pending Prescriptions Disp Refills   • BUPROPION HCL ER, SR, 150 MG Oral Tablet 12 Hr [Pharmacy Med Name: BUPROPION HCL  MG TABLET] 180 tablet 0     Sig: TAKE 1 TABLET BY MOUTH TWICE A D

## 2020-10-14 ENCOUNTER — OFFICE VISIT (OUTPATIENT)
Dept: INTEGRATIVE MEDICINE | Facility: CLINIC | Age: 65
End: 2020-10-14
Payer: COMMERCIAL

## 2020-10-14 VITALS
HEIGHT: 68 IN | OXYGEN SATURATION: 99 % | SYSTOLIC BLOOD PRESSURE: 148 MMHG | BODY MASS INDEX: 34.61 KG/M2 | WEIGHT: 228.38 LBS | DIASTOLIC BLOOD PRESSURE: 82 MMHG | HEART RATE: 88 BPM

## 2020-10-14 DIAGNOSIS — L81.9 ATYPICAL PIGMENTED SKIN LESION: ICD-10-CM

## 2020-10-14 DIAGNOSIS — E66.9 OBESITY (BMI 30-39.9): ICD-10-CM

## 2020-10-14 DIAGNOSIS — Z99.89 OSA ON CPAP: ICD-10-CM

## 2020-10-14 DIAGNOSIS — G47.33 OSA ON CPAP: ICD-10-CM

## 2020-10-14 DIAGNOSIS — E11.9 TYPE 2 DIABETES MELLITUS WITHOUT COMPLICATION, WITHOUT LONG-TERM CURRENT USE OF INSULIN (HCC): ICD-10-CM

## 2020-10-14 DIAGNOSIS — F41.9 ANXIETY: ICD-10-CM

## 2020-10-14 DIAGNOSIS — Z12.11 SCREEN FOR COLON CANCER: ICD-10-CM

## 2020-10-14 DIAGNOSIS — E78.2 MIXED HYPERLIPIDEMIA: ICD-10-CM

## 2020-10-14 DIAGNOSIS — F32.A DEPRESSION, UNSPECIFIED DEPRESSION TYPE: ICD-10-CM

## 2020-10-14 DIAGNOSIS — H43.399 VITREOUS FLOATERS, UNSPECIFIED LATERALITY: ICD-10-CM

## 2020-10-14 DIAGNOSIS — Z00.00 ROUTINE MEDICAL EXAM: Primary | ICD-10-CM

## 2020-10-14 DIAGNOSIS — I10 HYPERTENSION, ESSENTIAL: ICD-10-CM

## 2020-10-14 DIAGNOSIS — H53.9 VISION CHANGES: ICD-10-CM

## 2020-10-14 PROCEDURE — 96127 BRIEF EMOTIONAL/BEHAV ASSMT: CPT | Performed by: FAMILY MEDICINE

## 2020-10-14 PROCEDURE — 3008F BODY MASS INDEX DOCD: CPT | Performed by: FAMILY MEDICINE

## 2020-10-14 PROCEDURE — 3079F DIAST BP 80-89 MM HG: CPT | Performed by: FAMILY MEDICINE

## 2020-10-14 PROCEDURE — 3077F SYST BP >= 140 MM HG: CPT | Performed by: FAMILY MEDICINE

## 2020-10-14 PROCEDURE — 90471 IMMUNIZATION ADMIN: CPT | Performed by: FAMILY MEDICINE

## 2020-10-14 PROCEDURE — 90686 IIV4 VACC NO PRSV 0.5 ML IM: CPT | Performed by: FAMILY MEDICINE

## 2020-10-14 PROCEDURE — 99396 PREV VISIT EST AGE 40-64: CPT | Performed by: FAMILY MEDICINE

## 2020-10-14 NOTE — PROGRESS NOTES
Carlin Kingston is a 60year old male. Patient presents with:  Physical  Medication Follow-Up      HPI:     Having more depression. Strain with his marriage. Feeling saddened by the isolation of the pandemic. Needs individual counseling.    Living with da • FLU VAC QIV SPLIT 3 YRS AND OLDER (83088) 10/15/2007, 10/26/2010, 02/07/2012, 09/25/2012, 12/10/2013, 09/04/2014, 10/09/2015, 09/27/2016, 08/31/2017, 09/25/2018   • FLULAVAL 6 months & older 0.5 ml Prefilled syringe (94456) 10/08/2019   • Pneumovax 23 08 Spouse name: Not on file      Number of children: Not on file      Years of education: Not on file      Highest education level: Not on file    Occupational History      Not on file    Social Needs      Financial resource strain: Not on file      Food Nose: Nose normal.   Mouth/Throat: Oropharynx is clear and moist. No oropharyngeal exudate. Eyes: Pupils are equal, round, and reactive to light. Conjunctivae and EOM are normal. No scleral icterus. Neck: Normal range of motion. Neck supple.  No thyrome 12. Vision changes  - OPHTHALMOLOGY - INTERNAL    HTN - stable, cpm  Check DMII  Routine fasting labs ordered as above  Given referral for colonoscopy for colon cancer screening. Referred to derm. Referred to ophtho.    Counseled on and discussed supplem

## 2020-10-26 ENCOUNTER — LAB ENCOUNTER (OUTPATIENT)
Dept: LAB | Facility: HOSPITAL | Age: 65
End: 2020-10-26
Attending: FAMILY MEDICINE
Payer: COMMERCIAL

## 2020-10-26 DIAGNOSIS — E66.9 OBESITY (BMI 30-39.9): ICD-10-CM

## 2020-10-26 DIAGNOSIS — E11.9 TYPE 2 DIABETES MELLITUS WITHOUT COMPLICATION, WITHOUT LONG-TERM CURRENT USE OF INSULIN (HCC): ICD-10-CM

## 2020-10-26 DIAGNOSIS — Z00.00 ROUTINE MEDICAL EXAM: ICD-10-CM

## 2020-10-26 DIAGNOSIS — E78.2 MIXED HYPERLIPIDEMIA: ICD-10-CM

## 2020-10-26 PROCEDURE — 85025 COMPLETE CBC W/AUTO DIFF WBC: CPT

## 2020-10-26 PROCEDURE — 84481 FREE ASSAY (FT-3): CPT

## 2020-10-26 PROCEDURE — 36415 COLL VENOUS BLD VENIPUNCTURE: CPT

## 2020-10-26 PROCEDURE — 82306 VITAMIN D 25 HYDROXY: CPT

## 2020-10-26 PROCEDURE — 83036 HEMOGLOBIN GLYCOSYLATED A1C: CPT

## 2020-10-26 PROCEDURE — 84154 ASSAY OF PSA FREE: CPT

## 2020-10-26 PROCEDURE — 80053 COMPREHEN METABOLIC PANEL: CPT

## 2020-10-26 PROCEDURE — 84153 ASSAY OF PSA TOTAL: CPT

## 2020-10-26 PROCEDURE — 84439 ASSAY OF FREE THYROXINE: CPT

## 2020-10-26 PROCEDURE — 84443 ASSAY THYROID STIM HORMONE: CPT

## 2020-10-26 PROCEDURE — 80061 LIPID PANEL: CPT

## 2020-11-10 ENCOUNTER — TELEPHONE (OUTPATIENT)
Dept: INTEGRATIVE MEDICINE | Facility: CLINIC | Age: 65
End: 2020-11-10

## 2020-11-10 NOTE — TELEPHONE ENCOUNTER
Pt was possibly exposed to a patient in IOP program, is not symptomatic but advised to quarantine and if patient becomes symptomatic he can proceed to UC for test.

## 2020-12-02 ENCOUNTER — MED REC SCAN ONLY (OUTPATIENT)
Dept: INTEGRATIVE MEDICINE | Facility: CLINIC | Age: 65
End: 2020-12-02

## 2020-12-05 ENCOUNTER — MED REC SCAN ONLY (OUTPATIENT)
Dept: FAMILY MEDICINE CLINIC | Facility: CLINIC | Age: 65
End: 2020-12-05

## 2020-12-17 RX ORDER — GLIPIZIDE 10 MG/1
TABLET, FILM COATED, EXTENDED RELEASE ORAL
Qty: 90 TABLET | Refills: 0 | Status: SHIPPED | OUTPATIENT
Start: 2020-12-17 | End: 2021-04-03

## 2020-12-17 NOTE — TELEPHONE ENCOUNTER
A refill request was received for:  Requested Prescriptions     Pending Prescriptions Disp Refills   • GLIPIZIDE ER 10 MG Oral Tablet 24 Hr [Pharmacy Med Name: GLIPIZIDE ER 10 MG TABLET] 90 tablet 0     Sig: TAKE 1 TABLET BY MOUTH EVERY DAY     Last refill

## 2020-12-28 ENCOUNTER — TELEPHONE (OUTPATIENT)
Dept: INTEGRATIVE MEDICINE | Facility: CLINIC | Age: 65
End: 2020-12-28

## 2020-12-28 ENCOUNTER — LAB ENCOUNTER (OUTPATIENT)
Dept: LAB | Facility: HOSPITAL | Age: 65
End: 2020-12-28
Attending: NURSE PRACTITIONER
Payer: COMMERCIAL

## 2020-12-28 ENCOUNTER — TELEMEDICINE (OUTPATIENT)
Dept: FAMILY MEDICINE CLINIC | Facility: CLINIC | Age: 65
End: 2020-12-28
Payer: COMMERCIAL

## 2020-12-28 DIAGNOSIS — R09.81 NASAL CONGESTION: ICD-10-CM

## 2020-12-28 DIAGNOSIS — R52 BODY ACHES: ICD-10-CM

## 2020-12-28 DIAGNOSIS — J02.9 SORE THROAT: ICD-10-CM

## 2020-12-28 DIAGNOSIS — R05.9 COUGH: ICD-10-CM

## 2020-12-28 DIAGNOSIS — R06.02 SHORTNESS OF BREATH: ICD-10-CM

## 2020-12-28 DIAGNOSIS — R05.9 COUGH: Primary | ICD-10-CM

## 2020-12-28 PROCEDURE — 99213 OFFICE O/P EST LOW 20 MIN: CPT | Performed by: NURSE PRACTITIONER

## 2020-12-28 NOTE — PATIENT INSTRUCTIONS
Coronavirus Disease 2019 (COVID-19)     [facility] is committed to the safety and well-being of our patients, members, employees, and communities.  As concerns arise about the new strain of coronavirus that causes COVID-19, [facility] ? After 10 days without testing from date of last exposure  ? After day 7 from date of last exposure with a negative test result (test must occur on day 5 or later)  After stopping quarantine, you should  ? Watch for symptoms until 14 days after exposure. 10. Clean all surfaces that are touched often, like counters, tabletops, and doorknobs. Use household cleaning sprays or wipes according to the label instructions.          Seek Further Care     If you are awaiting test results or are confirmed positive for Patients with pending COVID-19 test results should follow all care and home isolation instructions. Your test results will be called to you from an [facility] representative.  If you have not received a call within 2 business days, please call your pr You can also get more information at the following websites:   Centers for Disease Control & Prevention (CDC)  What to do if you are sick with coronavirus disease 2019, [email or web address].pt. pdf

## 2020-12-28 NOTE — TELEPHONE ENCOUNTER
Received call from patient, would like to get tested for COVID. C/o dry cough, last night had chills and was overheating. Spouse has MS. Would like to get tested asap to avoid exposing spouse if he does have COVID.  Advised he can go to drive thru at [facility] if

## 2020-12-28 NOTE — PROGRESS NOTES
Due to the real risk of possible exposure to Coronavirus (CoV-2, COVID-19) in the office/medical building and recommendations for social distancing (key to mitigation/limiting the spread of the virus) a Virtual or Telemedicine visit over the phone was perf above.  Coding/billing information is submitted for this visit based on complexity of care and/or time spent for the visit.     HPI:  Patient presents with:  Acute: Cough, shortness of breath      Leidy Clay is a 60year old male who calls for complain dizziness. Physical Exam:  GEN:  Patient is alert, awake and oriented, well developed, well nourished. LUNGS: Patient speaks clearly in full sentences without dyspnea, no cough while on video visit. PSYCH: Appropriate mood and affect.       Seasonal • High blood pressure    • Recovering alcoholic ([facility]    • Sleep apnea      Past Surgical History:   Procedure Laterality Date   • CATH DRUG ELUTING STENT  2012         ASSESSMENT AND PLAN:   1.  Patient is a 60year old male who calls for: Cough, shortn patient's parent if <17 y/o) indicates understanding of the above recommendations and agrees to the above plan.     Orders Placed This Encounter      Symptomatic COVID-19 Testing by PCR () [E]      Meds & Refills for this Visit:  Requested Prescription

## 2020-12-29 LAB — SARS-COV-2 RNA RESP QL NAA+PROBE: NOT DETECTED

## 2021-01-04 ENCOUNTER — MED REC SCAN ONLY (OUTPATIENT)
Dept: INTEGRATIVE MEDICINE | Facility: CLINIC | Age: 66
End: 2021-01-04

## 2021-01-07 ENCOUNTER — MED REC SCAN ONLY (OUTPATIENT)
Dept: INTEGRATIVE MEDICINE | Facility: CLINIC | Age: 66
End: 2021-01-07

## 2021-01-07 DIAGNOSIS — E78.2 MIXED HYPERLIPIDEMIA: ICD-10-CM

## 2021-01-08 ENCOUNTER — TELEPHONE (OUTPATIENT)
Dept: FAMILY MEDICINE CLINIC | Facility: CLINIC | Age: 66
End: 2021-01-08

## 2021-01-08 RX ORDER — ROSUVASTATIN CALCIUM 20 MG/1
TABLET, COATED ORAL
Qty: 90 TABLET | Refills: 1 | Status: SHIPPED | OUTPATIENT
Start: 2021-01-08 | End: 2021-05-07

## 2021-01-08 RX ORDER — AMLODIPINE BESYLATE AND BENAZEPRIL HYDROCHLORIDE 10; 40 MG/1; MG/1
CAPSULE ORAL
Qty: 90 CAPSULE | Refills: 0 | Status: SHIPPED | OUTPATIENT
Start: 2021-01-08 | End: 2021-04-03

## 2021-01-08 RX ORDER — FAMOTIDINE 20 MG/1
TABLET ORAL
Qty: 180 TABLET | Refills: 1 | Status: SHIPPED | OUTPATIENT
Start: 2021-01-08 | End: 2021-01-11

## 2021-01-08 RX ORDER — FENOFIBRATE 145 MG/1
TABLET, COATED ORAL
Qty: 90 TABLET | Refills: 1 | Status: SHIPPED | OUTPATIENT
Start: 2021-01-08 | End: 2021-07-08

## 2021-01-08 RX ORDER — METOPROLOL SUCCINATE 25 MG/1
TABLET, EXTENDED RELEASE ORAL
Qty: 90 TABLET | Refills: 1 | Status: SHIPPED | OUTPATIENT
Start: 2021-01-08

## 2021-01-08 NOTE — TELEPHONE ENCOUNTER
Spoke with pt, discussed he had a cardiac stent on 12/2/20, was then put on brilinta. Had an abnormal EKG and a 2nd stent was placed 1/6/21. Currently, pt c/o difficulty staying asleep d/t sob, uses cpap.  Wakes up after 1.5-2 hrs of falling asleep and is \ Hillary BAILEY Monday 1/11/21

## 2021-01-08 NOTE — TELEPHONE ENCOUNTER
Pt experience shortness  of breath for weeks, He thinks it is cost by medication he is taking . Encounter routed to triage nurse.

## 2021-01-11 ENCOUNTER — LAB ENCOUNTER (OUTPATIENT)
Dept: LAB | Facility: REFERENCE LAB | Age: 66
End: 2021-01-11
Attending: NURSE PRACTITIONER
Payer: COMMERCIAL

## 2021-01-11 ENCOUNTER — TELEMEDICINE (OUTPATIENT)
Dept: FAMILY MEDICINE CLINIC | Facility: CLINIC | Age: 66
End: 2021-01-11

## 2021-01-11 ENCOUNTER — HOSPITAL ENCOUNTER (OUTPATIENT)
Dept: GENERAL RADIOLOGY | Facility: HOSPITAL | Age: 66
Discharge: HOME | End: 2021-01-11
Attending: NURSE PRACTITIONER
Payer: COMMERCIAL

## 2021-01-11 DIAGNOSIS — K21.9 GASTROESOPHAGEAL REFLUX DISEASE WITHOUT ESOPHAGITIS: ICD-10-CM

## 2021-01-11 DIAGNOSIS — Z95.5 STENTED CORONARY ARTERY: ICD-10-CM

## 2021-01-11 DIAGNOSIS — R06.02 SHORTNESS OF BREATH: ICD-10-CM

## 2021-01-11 DIAGNOSIS — F41.8 ANXIETY ABOUT HEALTH: ICD-10-CM

## 2021-01-11 DIAGNOSIS — G47.33 OSA ON CPAP: ICD-10-CM

## 2021-01-11 DIAGNOSIS — R09.82 POST-NASAL DRIP: ICD-10-CM

## 2021-01-11 DIAGNOSIS — Z99.89 OSA ON CPAP: ICD-10-CM

## 2021-01-11 DIAGNOSIS — R05.9 COUGH: ICD-10-CM

## 2021-01-11 DIAGNOSIS — G47.01 INSOMNIA DUE TO MEDICAL CONDITION: ICD-10-CM

## 2021-01-11 DIAGNOSIS — R05.9 COUGH: Primary | ICD-10-CM

## 2021-01-11 LAB
BASOPHILS # BLD AUTO: 0.03 X10(3) UL (ref 0–0.2)
BASOPHILS NFR BLD AUTO: 0.7 %
DEPRECATED RDW RBC AUTO: 51.1 FL (ref 35.1–46.3)
EOSINOPHIL # BLD AUTO: 0.05 X10(3) UL (ref 0–0.7)
EOSINOPHIL NFR BLD AUTO: 1.2 %
ERYTHROCYTE [DISTWIDTH] IN BLOOD BY AUTOMATED COUNT: 15 % (ref 11–15)
HCT VFR BLD AUTO: 38.4 %
HGB BLD-MCNC: 12.8 G/DL
IMM GRANULOCYTES # BLD AUTO: 0.05 X10(3) UL (ref 0–1)
IMM GRANULOCYTES NFR BLD: 1.2 %
LYMPHOCYTES # BLD AUTO: 0.92 X10(3) UL (ref 1–4)
LYMPHOCYTES NFR BLD AUTO: 22.7 %
MCH RBC QN AUTO: 31.2 PG (ref 26–34)
MCHC RBC AUTO-ENTMCNC: 33.3 G/DL (ref 31–37)
MCV RBC AUTO: 93.7 FL
MONOCYTES # BLD AUTO: 0.4 X10(3) UL (ref 0.1–1)
MONOCYTES NFR BLD AUTO: 9.9 %
NEUTROPHILS # BLD AUTO: 2.6 X10 (3) UL (ref 1.5–7.7)
NEUTROPHILS # BLD AUTO: 2.6 X10(3) UL (ref 1.5–7.7)
NEUTROPHILS NFR BLD AUTO: 64.3 %
NT-PROBNP SERPL-MCNC: 97 PG/ML (ref ?–125)
PLATELET # BLD AUTO: 183 10(3)UL (ref 150–450)
RBC # BLD AUTO: 4.1 X10(6)UL
WBC # BLD AUTO: 4.1 X10(3) UL (ref 4–11)

## 2021-01-11 PROCEDURE — 83880 ASSAY OF NATRIURETIC PEPTIDE: CPT

## 2021-01-11 PROCEDURE — 71046 X-RAY EXAM CHEST 2 VIEWS: CPT | Performed by: NURSE PRACTITIONER

## 2021-01-11 PROCEDURE — 99214 OFFICE O/P EST MOD 30 MIN: CPT | Performed by: NURSE PRACTITIONER

## 2021-01-11 PROCEDURE — 36415 COLL VENOUS BLD VENIPUNCTURE: CPT

## 2021-01-11 PROCEDURE — 85025 COMPLETE CBC W/AUTO DIFF WBC: CPT

## 2021-01-11 RX ORDER — OMEPRAZOLE 20 MG/1
20 CAPSULE, DELAYED RELEASE ORAL
Qty: 14 CAPSULE | Refills: 0 | Status: SHIPPED | OUTPATIENT
Start: 2021-01-11 | End: 2021-01-25

## 2021-01-11 NOTE — PROGRESS NOTES
Due to the real risk of possible exposure to Coronavirus (CoV-2, COVID-19) in the office/medical building and recommendations for social distancing (key to mitigation/limiting the spread of the virus) a Virtual or Telemedicine visit over the phone was perf above.  Coding/billing information is submitted for this visit based on complexity of care and/or time spent for the visit. HPI:  Patient presents with:   Follow - Up: Short of breath      Susan Workman is a 60year old male who calls for complaints of fatigue. HEENT: Denies sore throat, changes in taste/smell. Nasal congestion intermittently, post-nasal drip at times. CARDIOVASCULAR: Reports waking up at night multiple times gasping for air along with chest tightness.    RESPIRATORY: Cough and dyspnea Semaglutide, 1 MG/DOSE, (OZEMPIC, 1 MG/DOSE,) 2 MG/1.5ML Subcutaneous Solution Pen-injector Inject 1 mg into the skin once a week.  13 pen 1   • Albuterol Sulfate HFA (PROAIR HFA) 108 (90 Base) MCG/ACT Inhalation Aero Soln Inhale 2 puffs into the lungs ever follow-up with psychiatry to see what they recommend for his medications. 6. Anxiety about health  -SA    7. Gastroesophageal reflux disease without esophagitis  -Will trial omeprazole QAM x 2 weeks to see if this improves symptoms.  Possible that GERD

## 2021-01-12 ENCOUNTER — MED REC SCAN ONLY (OUTPATIENT)
Dept: INTEGRATIVE MEDICINE | Facility: CLINIC | Age: 66
End: 2021-01-12

## 2021-01-20 ENCOUNTER — TELEPHONE (OUTPATIENT)
Dept: INTEGRATIVE MEDICINE | Facility: CLINIC | Age: 66
End: 2021-01-20

## 2021-01-20 NOTE — TELEPHONE ENCOUNTER
patient called to request most recent visit summary (Video Visit from Dec.& Jan),  X-ray, and labs.  be sent to Dr. Jose Guidry Cardiologist Fax [phone]

## 2021-01-21 NOTE — TELEPHONE ENCOUNTER
Pt's request to have labs, last two office visits and x-ray faxed to Dr. Whitney Cedeno @ [phone] & Dr. Corby Philippe @ [phone] have all been faxed through on 01/20/21

## 2021-01-25 ENCOUNTER — TELEPHONE (OUTPATIENT)
Dept: FAMILY MEDICINE CLINIC | Facility: CLINIC | Age: 66
End: 2021-01-25

## 2021-01-25 RX ORDER — CLOPIDOGREL BISULFATE 75 MG/1
75 TABLET ORAL DAILY
COMMUNITY

## 2021-01-25 RX ORDER — FAMOTIDINE 20 MG/1
20 TABLET ORAL 2 TIMES DAILY
COMMUNITY
End: 2021-04-05

## 2021-01-25 NOTE — TELEPHONE ENCOUNTER
Received voicemail from pt. Cardiologist switched him from [address] [place] [address] to plavix. He has acid reflux. prilosec is contraindicated with plavix. Asking if he can go back on famotidine or other acid reflux med as he is having reflux symptoms.  Was on famotidine i

## 2021-01-25 NOTE — TELEPHONE ENCOUNTER
Famotidine is fine with Plavix. Please let him know and ask if he needs a refill, and what dose he was previously taking. Thanks!

## 2021-01-25 NOTE — TELEPHONE ENCOUNTER
Spoke with pt, said his breathing is much better since being switched from brilinta to plavix. Discussed okay to go back on famotidine. Pt has been on 20 mg BID. Does not need a refill at this time.     Said he recently did a telehealth visit with Dr. Shahnaz Garza

## 2021-01-29 ENCOUNTER — PATIENT MESSAGE (OUTPATIENT)
Dept: INTEGRATIVE MEDICINE | Facility: CLINIC | Age: 66
End: 2021-01-29

## 2021-01-29 NOTE — TELEPHONE ENCOUNTER
From: Karma Dutta  To: Aniyah Martinez DO  Sent: 1/29/2021 8:14 AM CST  Subject: Non-Urgent Medical Question    I thought I had a flu vaccine at my physical on 10/14/2020, but I don't see it recorded in the notes. Do I still need to get it?     Are there

## 2021-02-16 ENCOUNTER — OFFICE VISIT (OUTPATIENT)
Dept: INTEGRATIVE MEDICINE | Facility: CLINIC | Age: 66
End: 2021-02-16
Payer: MEDICARE

## 2021-02-16 VITALS
OXYGEN SATURATION: 98 % | BODY MASS INDEX: 35.07 KG/M2 | HEART RATE: 88 BPM | SYSTOLIC BLOOD PRESSURE: 132 MMHG | DIASTOLIC BLOOD PRESSURE: 72 MMHG | HEIGHT: 68 IN | WEIGHT: 231.38 LBS

## 2021-02-16 DIAGNOSIS — E78.2 MIXED HYPERLIPIDEMIA: Primary | ICD-10-CM

## 2021-02-16 DIAGNOSIS — F32.A DEPRESSION, UNSPECIFIED DEPRESSION TYPE: ICD-10-CM

## 2021-02-16 DIAGNOSIS — G47.33 OSA ON CPAP: ICD-10-CM

## 2021-02-16 DIAGNOSIS — G89.29 CHRONIC PAIN OF RIGHT KNEE: ICD-10-CM

## 2021-02-16 DIAGNOSIS — F41.9 ANXIETY: ICD-10-CM

## 2021-02-16 DIAGNOSIS — E11.9 TYPE 2 DIABETES MELLITUS WITHOUT COMPLICATION, WITHOUT LONG-TERM CURRENT USE OF INSULIN (HCC): ICD-10-CM

## 2021-02-16 DIAGNOSIS — I10 HYPERTENSION, ESSENTIAL: ICD-10-CM

## 2021-02-16 DIAGNOSIS — M25.561 CHRONIC PAIN OF RIGHT KNEE: ICD-10-CM

## 2021-02-16 DIAGNOSIS — D64.9 ANEMIA, UNSPECIFIED TYPE: ICD-10-CM

## 2021-02-16 DIAGNOSIS — M54.41 ACUTE BILATERAL LOW BACK PAIN WITH RIGHT-SIDED SCIATICA: ICD-10-CM

## 2021-02-16 DIAGNOSIS — Z12.11 SCREEN FOR COLON CANCER: ICD-10-CM

## 2021-02-16 DIAGNOSIS — Z99.89 OSA ON CPAP: ICD-10-CM

## 2021-02-16 PROCEDURE — 99214 OFFICE O/P EST MOD 30 MIN: CPT | Performed by: FAMILY MEDICINE

## 2021-02-16 PROCEDURE — 3078F DIAST BP <80 MM HG: CPT | Performed by: FAMILY MEDICINE

## 2021-02-16 PROCEDURE — 3075F SYST BP GE 130 - 139MM HG: CPT | Performed by: FAMILY MEDICINE

## 2021-02-16 PROCEDURE — 3008F BODY MASS INDEX DOCD: CPT | Performed by: FAMILY MEDICINE

## 2021-02-16 RX ORDER — BUDESONIDE AND FORMOTEROL FUMARATE DIHYDRATE 80; 4.5 UG/1; UG/1
2 AEROSOL RESPIRATORY (INHALATION) 2 TIMES DAILY
COMMUNITY
Start: 2021-01-29 | End: 2021-08-27

## 2021-02-16 RX ORDER — TRAZODONE HYDROCHLORIDE 50 MG/1
50 TABLET ORAL NIGHTLY
COMMUNITY
Start: 2021-02-08 | End: 2021-08-27

## 2021-02-16 NOTE — PROGRESS NOTES
Rianna Molina is a 60year old male. Patient presents with: Integrative Medicine Consult: 4 mo f/u      HPI:     Started having CV symptoms, was having wrist pains. Went for cardiac cath x2 (Dec. And Jan. 2 stents placed.     Started cardiac rehab - d TABLET BY MOUTH TWICE A DAY WITH MEALS 180 tablet 1   • ROSUVASTATIN CALCIUM 20 MG Oral Tab TAKE 1 TABLET BY MOUTH EVERY DAY AT NIGHT 90 tablet 1   • GLIPIZIDE ER 10 MG Oral Tablet 24 Hr TAKE 1 TABLET BY MOUTH EVERY DAY 90 tablet 0   • BUPROPION HCL ER, SR Intimate partner violence        Fear of current or ex partner: Not on file        Emotionally abused: Not on file        Physically abused: Not on file        Forced sexual activity: Not on file    Other Topics      Concerns:        Not on file    Social biotin consumption at least 72 hours prior to collection of a new sample.      • WBC 01/11/2021 4.1  4.0 - 11.0 x10(3) uL Final   • RBC 01/11/2021 4.10* 4.30 - 5.70 x10(6)uL Final   • HGB 01/11/2021 12.8* 13.0 - 17.5 g/dL Final   • HCT 01/11/2021 38.4* 39.0 viral RNA cannot be determined, and recollection and testing by a different method should be considered.      Test performed using the Abbott RealTime SARS-CoV-2 assay on the [place] [address]., Jazmin K, [address]     This test is b 0.358 - 3.740 mIU/mL Final    This test may exhibit interference when a sample is collected from a person who is consuming high dose of biotin (a.k.a., vitamin B7, vitamin H, coenzyme R) supplements resulting in serum concentrations >100 ng/mL.   Intake of biotin concentrations greater than 150 times (5–10 mg) the RDA. It is recommended that physicians ask all patients who may be on biotin supplementation to stop biotin consumption at least 72 hours prior to collection of a new sample.      • PSA, Free 10/26 high levels, particularly >60 ng/mL.    • Cholesterol, Total 10/26/2020 132  <200 mg/dL Final    Desirable  <200 mg/dL  Borderline  200-239 mg/dL  High      >=240 mg/dL       • HDL Cholesterol 10/26/2020 36* 40 - 59 mg/dL Final    Interpretive Information: BUN/CREA Ratio 10/26/2020 12.8  10.0 - 20.0 Final   • Calcium, Total 10/26/2020 9.2  8.5 - 10.1 mg/dL Final   • Calculated Osmolality 10/26/2020 290  275 - 295 mOsm/kg Final   • GFR, Non- 10/26/2020 71  >=60 Final   • GFR, -American Final      Xr Chest Pa + Lat Chest (cpt=71046)    Result Date: 1/11/2021  CONCLUSION:  1. Borderline cardiomegaly. Tortuous thoracic aorta. 2. No acute pulmonary disease. Hyperlucency upper lobes.     Dictated by (CST): David Santamaria MD on 1/11/2021

## 2021-02-19 ENCOUNTER — MED REC SCAN ONLY (OUTPATIENT)
Dept: INTEGRATIVE MEDICINE | Facility: CLINIC | Age: 66
End: 2021-02-19

## 2021-02-20 ENCOUNTER — LAB ENCOUNTER (OUTPATIENT)
Dept: LAB | Facility: HOSPITAL | Age: 66
End: 2021-02-20
Attending: FAMILY MEDICINE
Payer: MEDICARE

## 2021-02-20 DIAGNOSIS — D64.9 ANEMIA, UNSPECIFIED TYPE: ICD-10-CM

## 2021-02-20 DIAGNOSIS — E78.2 MIXED HYPERLIPIDEMIA: ICD-10-CM

## 2021-02-20 DIAGNOSIS — E11.9 TYPE 2 DIABETES MELLITUS WITHOUT COMPLICATION, WITHOUT LONG-TERM CURRENT USE OF INSULIN (HCC): ICD-10-CM

## 2021-02-20 LAB
ALBUMIN SERPL-MCNC: 4.4 G/DL (ref 3.4–5)
ALBUMIN/GLOB SERPL: 1.6 {RATIO} (ref 1–2)
ALP LIVER SERPL-CCNC: 51 U/L
ALT SERPL-CCNC: 85 U/L
ANION GAP SERPL CALC-SCNC: 5 MMOL/L (ref 0–18)
AST SERPL-CCNC: 47 U/L (ref 15–37)
BASOPHILS # BLD AUTO: 0.01 X10(3) UL (ref 0–0.2)
BASOPHILS NFR BLD AUTO: 0.4 %
BILIRUB SERPL-MCNC: 0.7 MG/DL (ref 0.1–2)
BUN BLD-MCNC: 10 MG/DL (ref 7–18)
BUN/CREAT SERPL: 10.3 (ref 10–20)
CALCIUM BLD-MCNC: 9.7 MG/DL (ref 8.5–10.1)
CHLORIDE SERPL-SCNC: 107 MMOL/L (ref 98–112)
CHOLEST SMN-MCNC: 93 MG/DL (ref ?–200)
CO2 SERPL-SCNC: 28 MMOL/L (ref 21–32)
CREAT BLD-MCNC: 0.97 MG/DL
DEPRECATED RDW RBC AUTO: 52.1 FL (ref 35.1–46.3)
EOSINOPHIL # BLD AUTO: 0.04 X10(3) UL (ref 0–0.7)
EOSINOPHIL NFR BLD AUTO: 1.4 %
ERYTHROCYTE [DISTWIDTH] IN BLOOD BY AUTOMATED COUNT: 15.1 % (ref 11–15)
EST. AVERAGE GLUCOSE BLD GHB EST-MCNC: 108 MG/DL (ref 68–126)
GLOBULIN PLAS-MCNC: 2.7 G/DL (ref 2.8–4.4)
GLUCOSE BLD-MCNC: 86 MG/DL (ref 70–99)
HBA1C MFR BLD HPLC: 5.4 % (ref ?–5.7)
HCT VFR BLD AUTO: 36.4 %
HDLC SERPL-MCNC: 34 MG/DL (ref 40–59)
HGB BLD-MCNC: 11.7 G/DL
IMM GRANULOCYTES # BLD AUTO: 0.02 X10(3) UL (ref 0–1)
IMM GRANULOCYTES NFR BLD: 0.7 %
LDLC SERPL CALC-MCNC: 46 MG/DL (ref ?–100)
LYMPHOCYTES # BLD AUTO: 0.91 X10(3) UL (ref 1–4)
LYMPHOCYTES NFR BLD AUTO: 32.6 %
M PROTEIN MFR SERPL ELPH: 7.1 G/DL (ref 6.4–8.2)
MCH RBC QN AUTO: 30.5 PG (ref 26–34)
MCHC RBC AUTO-ENTMCNC: 32.1 G/DL (ref 31–37)
MCV RBC AUTO: 94.8 FL
MONOCYTES # BLD AUTO: 0.36 X10(3) UL (ref 0.1–1)
MONOCYTES NFR BLD AUTO: 12.9 %
NEUTROPHILS # BLD AUTO: 1.45 X10 (3) UL (ref 1.5–7.7)
NEUTROPHILS # BLD AUTO: 1.45 X10(3) UL (ref 1.5–7.7)
NEUTROPHILS NFR BLD AUTO: 52 %
NONHDLC SERPL-MCNC: 59 MG/DL (ref ?–130)
OSMOLALITY SERPL CALC.SUM OF ELEC: 288 MOSM/KG (ref 275–295)
PATIENT FASTING Y/N/NP: YES
PATIENT FASTING Y/N/NP: YES
PLATELET # BLD AUTO: 167 10(3)UL (ref 150–450)
POTASSIUM SERPL-SCNC: 3.6 MMOL/L (ref 3.5–5.1)
RBC # BLD AUTO: 3.84 X10(6)UL
SODIUM SERPL-SCNC: 140 MMOL/L (ref 136–145)
TRIGL SERPL-MCNC: 65 MG/DL (ref 30–149)
VLDLC SERPL CALC-MCNC: 13 MG/DL (ref 0–30)
WBC # BLD AUTO: 2.8 X10(3) UL (ref 4–11)

## 2021-02-20 PROCEDURE — 80061 LIPID PANEL: CPT

## 2021-02-20 PROCEDURE — 80053 COMPREHEN METABOLIC PANEL: CPT

## 2021-02-20 PROCEDURE — 36415 COLL VENOUS BLD VENIPUNCTURE: CPT

## 2021-02-20 PROCEDURE — 85025 COMPLETE CBC W/AUTO DIFF WBC: CPT

## 2021-02-20 PROCEDURE — 83036 HEMOGLOBIN GLYCOSYLATED A1C: CPT

## 2021-02-22 ENCOUNTER — MED REC SCAN ONLY (OUTPATIENT)
Dept: INTEGRATIVE MEDICINE | Facility: CLINIC | Age: 66
End: 2021-02-22

## 2021-02-22 ENCOUNTER — TELEPHONE (OUTPATIENT)
Dept: INTEGRATIVE MEDICINE | Facility: CLINIC | Age: 66
End: 2021-02-22

## 2021-03-03 DIAGNOSIS — Z23 NEED FOR VACCINATION: ICD-10-CM

## 2021-04-02 NOTE — TELEPHONE ENCOUNTER
A refill request was received for:  Requested Prescriptions     Pending Prescriptions Disp Refills   • GLIPIZIDE ER 10 MG Oral Tablet 24 Hr [Pharmacy Med Name: GLIPIZIDE ER 10 MG TABLET] 90 tablet 0     Sig: TAKE 1 TABLET BY MOUTH EVERY DAY   • AMLODIPINE

## 2021-04-03 RX ORDER — GLIPIZIDE 10 MG/1
TABLET, FILM COATED, EXTENDED RELEASE ORAL
Qty: 90 TABLET | Refills: 0 | Status: SHIPPED | OUTPATIENT
Start: 2021-04-03 | End: 2021-06-30

## 2021-04-03 RX ORDER — AMLODIPINE BESYLATE AND BENAZEPRIL HYDROCHLORIDE 10; 40 MG/1; MG/1
CAPSULE ORAL
Qty: 90 CAPSULE | Refills: 0 | Status: SHIPPED | OUTPATIENT
Start: 2021-04-03 | End: 2021-06-30

## 2021-04-05 RX ORDER — FAMOTIDINE 20 MG/1
20 TABLET, FILM COATED ORAL 2 TIMES DAILY
Qty: 180 TABLET | Refills: 0 | Status: SHIPPED | OUTPATIENT
Start: 2021-04-05 | End: 2021-06-30

## 2021-04-28 ENCOUNTER — MED REC SCAN ONLY (OUTPATIENT)
Dept: INTEGRATIVE MEDICINE | Facility: CLINIC | Age: 66
End: 2021-04-28

## 2021-05-07 DIAGNOSIS — E78.2 MIXED HYPERLIPIDEMIA: ICD-10-CM

## 2021-05-07 RX ORDER — ROSUVASTATIN CALCIUM 20 MG/1
TABLET, COATED ORAL
Qty: 90 TABLET | Refills: 1 | Status: SHIPPED | OUTPATIENT
Start: 2021-05-07

## 2021-05-07 NOTE — TELEPHONE ENCOUNTER
A refill request was received for:  Requested Prescriptions     Pending Prescriptions Disp Refills   • ROSUVASTATIN CALCIUM 20 MG Oral Tab [Pharmacy Med Name: ROSUVASTATIN CALCIUM 20 MG TAB] 90 tablet 1     Sig: TAKE 1 TABLET BY MOUTH EVERY DAY AT NIGHT

## 2021-06-30 RX ORDER — AMLODIPINE BESYLATE AND BENAZEPRIL HYDROCHLORIDE 10; 40 MG/1; MG/1
CAPSULE ORAL
Qty: 90 CAPSULE | Refills: 0 | Status: SHIPPED | OUTPATIENT
Start: 2021-06-30 | End: 2021-10-01

## 2021-06-30 RX ORDER — GLIPIZIDE 10 MG/1
TABLET, FILM COATED, EXTENDED RELEASE ORAL
Qty: 90 TABLET | Refills: 0 | Status: SHIPPED | OUTPATIENT
Start: 2021-06-30 | End: 2021-10-01

## 2021-06-30 RX ORDER — FAMOTIDINE 20 MG/1
TABLET ORAL
Qty: 180 TABLET | Refills: 0 | Status: SHIPPED | OUTPATIENT
Start: 2021-06-30 | End: 2021-10-01

## 2021-07-08 DIAGNOSIS — E78.2 MIXED HYPERLIPIDEMIA: ICD-10-CM

## 2021-07-08 RX ORDER — FENOFIBRATE 145 MG/1
TABLET, COATED ORAL
Qty: 90 TABLET | Refills: 1 | Status: SHIPPED | OUTPATIENT
Start: 2021-07-08

## 2021-07-08 RX ORDER — SEMAGLUTIDE 1.34 MG/ML
INJECTION, SOLUTION SUBCUTANEOUS
Qty: 9 EACH | Refills: 3 | Status: SHIPPED | OUTPATIENT
Start: 2021-07-08

## 2021-07-08 NOTE — TELEPHONE ENCOUNTER
A refill request was received for:  Requested Prescriptions     Pending Prescriptions Disp Refills   • FENOFIBRATE 145 MG Oral Tab [Pharmacy Med Name: FENOFIBRATE 145 MG TABLET] 90 tablet 1     Sig: TAKE 1 TABLET BY MOUTH EVERY DAY   • OZEMPIC, 1 MG/DOSE,

## 2021-07-20 PROCEDURE — 88305 TISSUE EXAM BY PATHOLOGIST: CPT | Performed by: INTERNAL MEDICINE

## 2021-08-27 ENCOUNTER — OFFICE VISIT (OUTPATIENT)
Dept: INTEGRATIVE MEDICINE | Facility: CLINIC | Age: 66
End: 2021-08-27
Payer: MEDICARE

## 2021-08-27 VITALS
SYSTOLIC BLOOD PRESSURE: 134 MMHG | BODY MASS INDEX: 32.89 KG/M2 | HEART RATE: 84 BPM | DIASTOLIC BLOOD PRESSURE: 84 MMHG | WEIGHT: 217 LBS | OXYGEN SATURATION: 99 % | HEIGHT: 68 IN

## 2021-08-27 DIAGNOSIS — M25.561 CHRONIC PAIN OF RIGHT KNEE: ICD-10-CM

## 2021-08-27 DIAGNOSIS — G89.29 CHRONIC PAIN OF RIGHT KNEE: ICD-10-CM

## 2021-08-27 DIAGNOSIS — E11.9 TYPE 2 DIABETES MELLITUS WITHOUT COMPLICATION, WITHOUT LONG-TERM CURRENT USE OF INSULIN (HCC): Primary | ICD-10-CM

## 2021-08-27 DIAGNOSIS — Z99.89 OSA ON CPAP: ICD-10-CM

## 2021-08-27 DIAGNOSIS — I10 HYPERTENSION, ESSENTIAL: ICD-10-CM

## 2021-08-27 DIAGNOSIS — F32.A DEPRESSION, UNSPECIFIED DEPRESSION TYPE: ICD-10-CM

## 2021-08-27 DIAGNOSIS — D64.9 ANEMIA, UNSPECIFIED TYPE: ICD-10-CM

## 2021-08-27 DIAGNOSIS — M54.41 CHRONIC RIGHT-SIDED LOW BACK PAIN WITH RIGHT-SIDED SCIATICA: ICD-10-CM

## 2021-08-27 DIAGNOSIS — G89.29 CHRONIC RIGHT-SIDED LOW BACK PAIN WITH RIGHT-SIDED SCIATICA: ICD-10-CM

## 2021-08-27 DIAGNOSIS — G47.33 OSA ON CPAP: ICD-10-CM

## 2021-08-27 DIAGNOSIS — I25.10 CORONARY ARTERY DISEASE INVOLVING NATIVE CORONARY ARTERY OF NATIVE HEART WITHOUT ANGINA PECTORIS: ICD-10-CM

## 2021-08-27 LAB
CARTRIDGE LOT#: 756 NUMERIC
HEMOGLOBIN A1C: 5.6 % (ref 4.3–5.6)

## 2021-08-27 PROCEDURE — 83036 HEMOGLOBIN GLYCOSYLATED A1C: CPT | Performed by: FAMILY MEDICINE

## 2021-08-27 PROCEDURE — 99214 OFFICE O/P EST MOD 30 MIN: CPT | Performed by: FAMILY MEDICINE

## 2021-08-27 NOTE — PROGRESS NOTES
Karma Dutta is a 73year old male. Patient presents with: Follow - Up: 6 mos - BP, T2D, depression      HPI:     Hga1c today is 5.6  Having some allergy symptoms. Taking alb MDI 1 puff prior to sleep. Had c-scope, was normal.  Still anemic.  Incre 1 TABLET BY MOUTH EVERY DAY 90 tablet 1   • METFORMIN HCL 1000 MG Oral Tab TAKE 1 TABLET BY MOUTH TWICE A DAY WITH MEALS 180 tablet 1   • BUPROPION HCL ER, SR, 150 MG Oral Tablet 12 Hr TAKE 1 TABLET BY MOUTH TWICE A  tablet 0   • Albuterol Sulfate H Organization Meetings:       Marital Status:   Intimate Partner Violence:       Fear of Current or Ex-Partner:       Emotionally Abused:       Physically Abused:       Sexually Abused:     SURGICAL HISTORY:     Past Surgical History:   Procedure [facility] healthcare provider. Test performed using the Xpert Xpress SARS-CoV-2 assay on the [facility], [facility], [address] [place] [address]. This test is being used under the Food and Drug Administration's Emergency Use Authorization.    The authorized Fact S • Saturation Percent 05/25/2021 39  13 - 45 % Final   • UIBC 05/25/2021 209* 50 - 180 µg/dL Final   • Ferritin 05/25/2021 221.8  30.0 - 400.0 ng/mL Final   • WBC 05/25/2021 3.16* 4.00 - 13.00 10^3/uL Final   • RBC 05/25/2021 4.13  3.80 - 5.80 10^6/uL Fin present management. Check lipids and CBC. Continue iron supplementation. Patient was referred for physical therapy to manage symptoms further. Counseled on and discussed further diet and/or supplement recommendations in detail.     Follow-up with of nutrients including vitamin A and iron. A microscopic single-celled plant that grows in fresh water, chlorella's abundance of chlorophyll gives it a deep, emerald-green color.  A potent superfood, chlorella is a great way to introduce detoxifying, alkali

## 2021-10-01 RX ORDER — FAMOTIDINE 20 MG/1
20 TABLET, FILM COATED ORAL 2 TIMES DAILY
Qty: 180 TABLET | Refills: 1 | Status: SHIPPED | OUTPATIENT
Start: 2021-10-01 | End: 2022-03-01

## 2021-10-01 RX ORDER — AMLODIPINE AND BENAZEPRIL HYDROCHLORIDE 10; 40 MG/1; MG/1
1 CAPSULE ORAL DAILY
Qty: 90 CAPSULE | Refills: 1 | Status: SHIPPED | OUTPATIENT
Start: 2021-10-01 | End: 2022-04-07

## 2021-10-01 RX ORDER — GLIPIZIDE 10 MG/1
10 TABLET, FILM COATED, EXTENDED RELEASE ORAL DAILY
Qty: 90 TABLET | Refills: 1 | Status: SHIPPED | OUTPATIENT
Start: 2021-10-01 | End: 2022-10-24

## 2021-10-05 NOTE — TELEPHONE ENCOUNTER
A refill request was received for:  Requested Prescriptions     Pending Prescriptions Disp Refills   • metFORMIN HCl 1000 MG Oral Tab 180 tablet 1     Sig: Take 1 tablet (1,000 mg total) by mouth 2 (two) times daily with meals.      Last refill date: 01/08/

## 2022-03-01 ENCOUNTER — OFFICE VISIT (OUTPATIENT)
Dept: INTEGRATIVE MEDICINE | Facility: CLINIC | Age: 67
End: 2022-03-01
Payer: MEDICARE

## 2022-03-01 ENCOUNTER — HOSPITAL ENCOUNTER (OUTPATIENT)
Dept: GENERAL RADIOLOGY | Age: 67
Discharge: HOME | End: 2022-03-01
Attending: FAMILY MEDICINE
Payer: MEDICARE

## 2022-03-01 VITALS
SYSTOLIC BLOOD PRESSURE: 138 MMHG | HEIGHT: 68 IN | HEART RATE: 100 BPM | BODY MASS INDEX: 31.98 KG/M2 | WEIGHT: 211 LBS | OXYGEN SATURATION: 98 % | DIASTOLIC BLOOD PRESSURE: 66 MMHG

## 2022-03-01 DIAGNOSIS — N20.0 KIDNEY STONE: ICD-10-CM

## 2022-03-01 DIAGNOSIS — E11.9 TYPE 2 DIABETES MELLITUS WITHOUT COMPLICATION, WITHOUT LONG-TERM CURRENT USE OF INSULIN (HCC): ICD-10-CM

## 2022-03-01 DIAGNOSIS — Z12.5 PROSTATE CANCER SCREENING: ICD-10-CM

## 2022-03-01 DIAGNOSIS — I25.10 CORONARY ARTERY DISEASE INVOLVING NATIVE CORONARY ARTERY OF NATIVE HEART WITHOUT ANGINA PECTORIS: ICD-10-CM

## 2022-03-01 DIAGNOSIS — G89.29 CHRONIC PAIN OF RIGHT KNEE: ICD-10-CM

## 2022-03-01 DIAGNOSIS — K63.5 POLYP OF COLON, UNSPECIFIED PART OF COLON, UNSPECIFIED TYPE: ICD-10-CM

## 2022-03-01 DIAGNOSIS — K21.9 GASTROESOPHAGEAL REFLUX DISEASE WITHOUT ESOPHAGITIS: ICD-10-CM

## 2022-03-01 DIAGNOSIS — L57.0 ACTINIC KERATOSIS: ICD-10-CM

## 2022-03-01 DIAGNOSIS — Z79.82 LONG TERM CURRENT USE OF ASPIRIN: ICD-10-CM

## 2022-03-01 DIAGNOSIS — M54.41 CHRONIC RIGHT-SIDED LOW BACK PAIN WITH RIGHT-SIDED SCIATICA: ICD-10-CM

## 2022-03-01 DIAGNOSIS — G47.33 OSA ON CPAP: ICD-10-CM

## 2022-03-01 DIAGNOSIS — F32.A DEPRESSION, UNSPECIFIED DEPRESSION TYPE: ICD-10-CM

## 2022-03-01 DIAGNOSIS — F10.11 NONDEPENDENT ALCOHOL ABUSE, IN REMISSION: ICD-10-CM

## 2022-03-01 DIAGNOSIS — F41.9 ANXIETY: ICD-10-CM

## 2022-03-01 DIAGNOSIS — M25.561 CHRONIC PAIN OF RIGHT KNEE: ICD-10-CM

## 2022-03-01 DIAGNOSIS — I25.9 CHRONIC ISCHEMIC HEART DISEASE: ICD-10-CM

## 2022-03-01 DIAGNOSIS — H91.91 HEARING LOSS OF RIGHT EAR, UNSPECIFIED HEARING LOSS TYPE: ICD-10-CM

## 2022-03-01 DIAGNOSIS — Z95.5 STATUS POST CORONARY ARTERY STENT PLACEMENT: ICD-10-CM

## 2022-03-01 DIAGNOSIS — I10 HYPERTENSION, ESSENTIAL: ICD-10-CM

## 2022-03-01 DIAGNOSIS — E78.2 MIXED HYPERLIPIDEMIA: ICD-10-CM

## 2022-03-01 DIAGNOSIS — E78.00 PURE HYPERCHOLESTEROLEMIA: ICD-10-CM

## 2022-03-01 DIAGNOSIS — N40.2 PROSTATE NODULE: ICD-10-CM

## 2022-03-01 DIAGNOSIS — G89.29 CHRONIC RIGHT-SIDED LOW BACK PAIN WITH RIGHT-SIDED SCIATICA: ICD-10-CM

## 2022-03-01 DIAGNOSIS — D64.9 ANEMIA, UNSPECIFIED TYPE: ICD-10-CM

## 2022-03-01 DIAGNOSIS — Z95.5 STENTED CORONARY ARTERY: ICD-10-CM

## 2022-03-01 DIAGNOSIS — I20.8 CARDIAC SYNDROME X (HCC): ICD-10-CM

## 2022-03-01 DIAGNOSIS — M54.41 ACUTE BILATERAL LOW BACK PAIN WITH RIGHT-SIDED SCIATICA: ICD-10-CM

## 2022-03-01 DIAGNOSIS — Z00.00 ROUTINE MEDICAL EXAM: Primary | ICD-10-CM

## 2022-03-01 DIAGNOSIS — Z99.89 OSA ON CPAP: ICD-10-CM

## 2022-03-01 DIAGNOSIS — E66.9 OBESITY (BMI 30-39.9): ICD-10-CM

## 2022-03-01 PROBLEM — Z87.442 HISTORY OF KIDNEY STONES: Status: ACTIVE | Noted: 2022-02-04

## 2022-03-01 PROBLEM — I20.89 CARDIAC SYNDROME X (HCC): Status: ACTIVE | Noted: 2020-12-02

## 2022-03-01 PROBLEM — I20.89 CARDIAC SYNDROME X: Status: ACTIVE | Noted: 2020-12-02

## 2022-03-01 PROCEDURE — G0439 PPPS, SUBSEQ VISIT: HCPCS | Performed by: FAMILY MEDICINE

## 2022-03-01 PROCEDURE — G0009 ADMIN PNEUMOCOCCAL VACCINE: HCPCS | Performed by: FAMILY MEDICINE

## 2022-03-01 PROCEDURE — 74018 RADEX ABDOMEN 1 VIEW: CPT | Performed by: FAMILY MEDICINE

## 2022-03-01 PROCEDURE — 90670 PCV13 VACCINE IM: CPT | Performed by: FAMILY MEDICINE

## 2022-03-01 PROCEDURE — 99215 OFFICE O/P EST HI 40 MIN: CPT | Performed by: FAMILY MEDICINE

## 2022-03-01 RX ORDER — OMEPRAZOLE 20 MG/1
1 CAPSULE, DELAYED RELEASE ORAL DAILY
COMMUNITY
Start: 2021-01-21 | End: 2022-03-01

## 2022-03-02 RX ORDER — FAMOTIDINE 20 MG/1
20 TABLET, FILM COATED ORAL 2 TIMES DAILY
Qty: 180 TABLET | Refills: 1 | Status: SHIPPED | OUTPATIENT
Start: 2022-03-02

## 2022-03-04 ENCOUNTER — PATIENT MESSAGE (OUTPATIENT)
Dept: SURGERY | Facility: CLINIC | Age: 67
End: 2022-03-04

## 2022-03-04 RX ORDER — ALBUTEROL SULFATE 90 UG/1
2 AEROSOL, METERED RESPIRATORY (INHALATION) EVERY 4 HOURS PRN
Qty: 18 G | Refills: 1 | Status: SHIPPED | OUTPATIENT
Start: 2022-03-04 | End: 2022-12-09

## 2022-03-04 RX ORDER — SEMAGLUTIDE 1.34 MG/ML
1 INJECTION, SOLUTION SUBCUTANEOUS WEEKLY
Qty: 9 EACH | Refills: 3 | Status: SHIPPED | OUTPATIENT
Start: 2022-03-04 | End: 2023-09-25

## 2022-03-04 RX ORDER — ROSUVASTATIN CALCIUM 20 MG/1
TABLET, COATED ORAL
Qty: 90 TABLET | Refills: 1 | Status: SHIPPED | OUTPATIENT
Start: 2022-03-04

## 2022-03-04 RX ORDER — FENOFIBRATE 145 MG/1
TABLET, COATED ORAL
Qty: 90 TABLET | Refills: 1 | Status: SHIPPED | OUTPATIENT
Start: 2022-03-04

## 2022-03-04 NOTE — TELEPHONE ENCOUNTER
RN replied to patient via [email or web address] and sent the Authorization To Use and Katie Arriola Form via mail. Mail  on Monday, 3/7.

## 2022-03-04 NOTE — TELEPHONE ENCOUNTER
From: Malaika Juarez  To: Ines Candelaria MD  Sent: 3/4/2022 1:01 PM CST  Subject: Medical record for my 2022 visit    Froilan Boswell! I have been referred to you by Dr. Karena Douglas for follow up on a kidney stone and for a node on my prostate that she found on my prostate at my 2022 annual physical.     I had no history of kidney stones until it was diagnosed in an ER visit on 2022 at [facility] in [place], [place]. I was told that a 6mm stone was observed in a CT scan. I think it would help you to have that CT scan and those records available to you for my scheduled visit with you, especially since I am not aware of passing a stone and it was not observed on an abdominal X-ray on 2022. I have not had symptoms since I had some observable blood in my urine on 02/10/2022. By then I no longer had any pain and my urine flow was strong. I have been drinking 3-4 quarts (or more) of water per day since the ER visit. The records department there told me that the records could be obtained by faxing a request, on your letterhead, with my name, date of birth, what records were needed, and where to send the records to [phone]. Information you may need: Malaika Juarez, : 1955, MRN: [id number], admit date: 2022. If you need any other information, please let me know.    Malaika Juarez

## 2022-03-08 LAB — AMB EXT HGBA1C: 5.3 %

## 2022-03-21 ENCOUNTER — PATIENT MESSAGE (OUTPATIENT)
Dept: INTEGRATIVE MEDICINE | Facility: CLINIC | Age: 67
End: 2022-03-21

## 2022-04-01 ENCOUNTER — TELEPHONE (OUTPATIENT)
Dept: PHYSICAL THERAPY | Facility: HOSPITAL | Age: 67
End: 2022-04-01

## 2022-04-04 ENCOUNTER — OFFICE VISIT (OUTPATIENT)
Dept: SURGERY | Facility: CLINIC | Age: 67
End: 2022-04-04
Payer: MEDICARE

## 2022-04-04 ENCOUNTER — TELEPHONE (OUTPATIENT)
Dept: PHYSICAL THERAPY | Facility: HOSPITAL | Age: 67
End: 2022-04-04

## 2022-04-04 VITALS
SYSTOLIC BLOOD PRESSURE: 173 MMHG | HEIGHT: 68 IN | WEIGHT: 215 LBS | HEART RATE: 90 BPM | BODY MASS INDEX: 32.58 KG/M2 | DIASTOLIC BLOOD PRESSURE: 94 MMHG

## 2022-04-04 DIAGNOSIS — N20.1 RIGHT URETERAL STONE: ICD-10-CM

## 2022-04-04 DIAGNOSIS — N20.0 KIDNEY STONES: Primary | ICD-10-CM

## 2022-04-04 DIAGNOSIS — R39.89 ABNORMAL PROSTATE EXAM: ICD-10-CM

## 2022-04-04 PROCEDURE — 99204 OFFICE O/P NEW MOD 45 MIN: CPT | Performed by: UROLOGY

## 2022-04-05 ENCOUNTER — HOSPITAL ENCOUNTER (OUTPATIENT)
Dept: CT IMAGING | Facility: HOSPITAL | Age: 67
Discharge: HOME | End: 2022-04-05
Attending: UROLOGY
Payer: MEDICARE

## 2022-04-05 ENCOUNTER — OFFICE VISIT (OUTPATIENT)
Dept: PHYSICAL THERAPY | Age: 67
End: 2022-04-05
Attending: FAMILY MEDICINE
Payer: MEDICARE

## 2022-04-05 DIAGNOSIS — M25.561 CHRONIC PAIN OF RIGHT KNEE: ICD-10-CM

## 2022-04-05 DIAGNOSIS — N20.1 RIGHT URETERAL STONE: ICD-10-CM

## 2022-04-05 DIAGNOSIS — M54.41 CHRONIC RIGHT-SIDED LOW BACK PAIN WITH RIGHT-SIDED SCIATICA: ICD-10-CM

## 2022-04-05 DIAGNOSIS — G89.29 CHRONIC RIGHT-SIDED LOW BACK PAIN WITH RIGHT-SIDED SCIATICA: ICD-10-CM

## 2022-04-05 DIAGNOSIS — G89.29 CHRONIC PAIN OF RIGHT KNEE: ICD-10-CM

## 2022-04-05 PROCEDURE — 97162 PT EVAL MOD COMPLEX 30 MIN: CPT | Performed by: PHYSICAL THERAPIST

## 2022-04-05 PROCEDURE — 74176 CT ABD & PELVIS W/O CONTRAST: CPT | Performed by: UROLOGY

## 2022-04-05 PROCEDURE — 97110 THERAPEUTIC EXERCISES: CPT | Performed by: PHYSICAL THERAPIST

## 2022-04-05 NOTE — PATIENT INSTRUCTIONS
Patient was instructed in and issued a HEP for:  Pronelying to extension in pronelying 2-3 mins to lumbar extension in standing and/or pronelying x 10 reps 4-6xday (every 2-3 hours); Posture correction in sitting using a good/supportive chair with lumbar roll; Avoidance of sitting on soft surface sofa/couch/recliner.

## 2022-04-07 ENCOUNTER — OFFICE VISIT (OUTPATIENT)
Dept: PHYSICAL THERAPY | Age: 67
End: 2022-04-07
Attending: FAMILY MEDICINE
Payer: MEDICARE

## 2022-04-07 ENCOUNTER — TELEPHONE (OUTPATIENT)
Dept: INTEGRATIVE MEDICINE | Facility: CLINIC | Age: 67
End: 2022-04-07

## 2022-04-07 ENCOUNTER — OFFICE VISIT (OUTPATIENT)
Dept: OTOLARYNGOLOGY | Facility: CLINIC | Age: 67
End: 2022-04-07
Payer: MEDICARE

## 2022-04-07 ENCOUNTER — OFFICE VISIT (OUTPATIENT)
Dept: AUDIOLOGY | Facility: CLINIC | Age: 67
End: 2022-04-07
Payer: MEDICARE

## 2022-04-07 DIAGNOSIS — H61.23 BILATERAL IMPACTED CERUMEN: Primary | ICD-10-CM

## 2022-04-07 DIAGNOSIS — H90.3 SENSORINEURAL HEARING LOSS (SNHL) OF BOTH EARS: Primary | ICD-10-CM

## 2022-04-07 PROCEDURE — 99203 OFFICE O/P NEW LOW 30 MIN: CPT | Performed by: OTOLARYNGOLOGY

## 2022-04-07 PROCEDURE — 92557 COMPREHENSIVE HEARING TEST: CPT | Performed by: AUDIOLOGIST

## 2022-04-07 PROCEDURE — 92567 TYMPANOMETRY: CPT | Performed by: AUDIOLOGIST

## 2022-04-07 PROCEDURE — 97110 THERAPEUTIC EXERCISES: CPT | Performed by: PHYSICAL THERAPIST

## 2022-04-07 RX ORDER — AMLODIPINE BESYLATE AND BENAZEPRIL HYDROCHLORIDE 10; 40 MG/1; MG/1
1 CAPSULE ORAL DAILY
Qty: 90 CAPSULE | Refills: 1 | Status: SHIPPED | OUTPATIENT
Start: 2022-04-07

## 2022-04-07 NOTE — PATIENT INSTRUCTIONS
Photo 65, 66 x 2-3 mins  Photo 67, 68 x 10 reps 4-6x/day     Or      (R) SGIS ((L) shoulder on wall) Photo 71, 72 x 8 reps   + back extension Photo 73 x 2 reps 4-6x/day

## 2022-04-07 NOTE — TELEPHONE ENCOUNTER
Responded to patient's MyChart question regarding presence of calculus on the CT scan completed on 4/5/22. Calculi noted bilaterally per radiologist however non-obstructive on L, among other findings. Pt also inquiring re: status of receipt of recent [facility] lab results. Informed that no [facility] results received as of noon on 4/5/22.

## 2022-04-12 ENCOUNTER — OFFICE VISIT (OUTPATIENT)
Dept: PHYSICAL THERAPY | Age: 67
End: 2022-04-12
Attending: FAMILY MEDICINE
Payer: MEDICARE

## 2022-04-12 PROCEDURE — 97110 THERAPEUTIC EXERCISES: CPT | Performed by: PHYSICAL THERAPIST

## 2022-04-14 ENCOUNTER — OFFICE VISIT (OUTPATIENT)
Dept: PHYSICAL THERAPY | Age: 67
End: 2022-04-14
Attending: FAMILY MEDICINE
Payer: MEDICARE

## 2022-04-14 PROCEDURE — 97110 THERAPEUTIC EXERCISES: CPT | Performed by: PHYSICAL THERAPIST

## 2022-04-19 ENCOUNTER — OFFICE VISIT (OUTPATIENT)
Dept: PHYSICAL THERAPY | Age: 67
End: 2022-04-19
Attending: FAMILY MEDICINE
Payer: MEDICARE

## 2022-04-19 PROCEDURE — 97110 THERAPEUTIC EXERCISES: CPT | Performed by: PHYSICAL THERAPIST

## 2022-04-21 ENCOUNTER — OFFICE VISIT (OUTPATIENT)
Dept: PHYSICAL THERAPY | Age: 67
End: 2022-04-21
Attending: FAMILY MEDICINE
Payer: MEDICARE

## 2022-04-21 PROCEDURE — 97110 THERAPEUTIC EXERCISES: CPT | Performed by: PHYSICAL THERAPIST

## 2022-04-24 ENCOUNTER — PATIENT MESSAGE (OUTPATIENT)
Dept: SURGERY | Facility: CLINIC | Age: 67
End: 2022-04-24

## 2022-04-26 ENCOUNTER — OFFICE VISIT (OUTPATIENT)
Dept: PHYSICAL THERAPY | Age: 67
End: 2022-04-26
Attending: FAMILY MEDICINE
Payer: MEDICARE

## 2022-04-26 PROCEDURE — 97110 THERAPEUTIC EXERCISES: CPT | Performed by: PHYSICAL THERAPIST

## 2022-04-27 ENCOUNTER — TELEPHONE (OUTPATIENT)
Dept: SURGERY | Facility: CLINIC | Age: 67
End: 2022-04-27

## 2022-04-27 NOTE — TELEPHONE ENCOUNTER
Replied to patient via my chart. Also send message to [facility] and to in basket as an acute message.

## 2022-04-27 NOTE — TELEPHONE ENCOUNTER
Called pt. States he is having urinary frequency and urgency with urge incontinence that has been ongoing intermittently for the past 3-4 months. Denies burning, denies visible blood in urine. Denies fevers or flu-like sx. Denies bladder pain/pressure, and states he empties bladder. Asked pt to submit urine specimen for UA/UC. Pt asking to have lab orders sent to [facility] in [facility]. Orders faxed to [phone].

## 2022-04-28 ENCOUNTER — OFFICE VISIT (OUTPATIENT)
Dept: PHYSICAL THERAPY | Age: 67
End: 2022-04-28
Attending: FAMILY MEDICINE
Payer: MEDICARE

## 2022-04-28 PROCEDURE — 97110 THERAPEUTIC EXERCISES: CPT | Performed by: PHYSICAL THERAPIST

## 2022-04-29 ENCOUNTER — PATIENT MESSAGE (OUTPATIENT)
Dept: INTEGRATIVE MEDICINE | Facility: CLINIC | Age: 67
End: 2022-04-29

## 2022-04-29 NOTE — TELEPHONE ENCOUNTER
I called the patient. Discussed with him the results of his CT scan showing a persistent stone in the distal right ureter that could possibly explain some of the irritative urination symptoms he has been describing. Sonja please schedule the patient based on paperwork I will provide. If possible I would like to do this week of May 2.

## 2022-04-29 NOTE — TELEPHONE ENCOUNTER
Spoke with patient, scheduled CYSTOSCOPY, RIGHT RETROGRADE PYELOGRAM, RIGHT URETEROSCOPY, LASER LITHOTRIPSY, RIGHT STENT PLACEMENT, Thursday 05/05/2022, [facility]/outpatient, all will be sent to patient' my chart    Faxed clearance to ' office [phone], I will await recommendations.

## 2022-05-02 RX ORDER — SODIUM CHLORIDE, SODIUM LACTATE, POTASSIUM CHLORIDE, CALCIUM CHLORIDE 600; 310; 30; 20 MG/100ML; MG/100ML; MG/100ML; MG/100ML
INJECTION, SOLUTION INTRAVENOUS CONTINUOUS
Status: CANCELLED | OUTPATIENT
Start: 2022-05-02

## 2022-05-02 RX ORDER — METOPROLOL SUCCINATE 25 MG/1
25 TABLET, EXTENDED RELEASE ORAL DAILY
Qty: 90 TABLET | Refills: 1 | Status: SHIPPED | OUTPATIENT
Start: 2022-05-02

## 2022-05-02 NOTE — TELEPHONE ENCOUNTER
Pt informed via [employer] that surgical clearance form has been faxed to Dr. Feli Ha office at [phone].

## 2022-05-02 NOTE — TELEPHONE ENCOUNTER
Received patient' clearance roxanne florian to hold aspirin 5 days prior to surgery, to resume after procedure. Faxed to pre-admit.

## 2022-05-03 ENCOUNTER — LAB ENCOUNTER (OUTPATIENT)
Dept: LAB | Age: 67
End: 2022-05-03
Attending: UROLOGY
Payer: MEDICARE

## 2022-05-03 DIAGNOSIS — Z01.818 PREOP TESTING: ICD-10-CM

## 2022-05-04 LAB — SARS-COV-2 RNA RESP QL NAA+PROBE: NOT DETECTED

## 2022-05-05 ENCOUNTER — HOSPITAL ENCOUNTER (OUTPATIENT)
Facility: HOSPITAL | Age: 67
Setting detail: HOSPITAL OUTPATIENT SURGERY
Discharge: HOME | End: 2022-05-05
Attending: UROLOGY | Admitting: UROLOGY
Payer: MEDICARE

## 2022-05-05 ENCOUNTER — ANESTHESIA (OUTPATIENT)
Dept: SURGERY | Facility: HOSPITAL | Age: 67
End: 2022-05-05
Payer: MEDICARE

## 2022-05-05 ENCOUNTER — TELEPHONE (OUTPATIENT)
Dept: SURGERY | Facility: CLINIC | Age: 67
End: 2022-05-05

## 2022-05-05 ENCOUNTER — ANESTHESIA EVENT (OUTPATIENT)
Dept: SURGERY | Facility: HOSPITAL | Age: 67
End: 2022-05-05
Payer: MEDICARE

## 2022-05-05 ENCOUNTER — APPOINTMENT (OUTPATIENT)
Dept: GENERAL RADIOLOGY | Facility: HOSPITAL | Age: 67
End: 2022-05-05
Attending: UROLOGY
Payer: MEDICARE

## 2022-05-05 VITALS
OXYGEN SATURATION: 96 % | HEIGHT: 68 IN | BODY MASS INDEX: 32.74 KG/M2 | SYSTOLIC BLOOD PRESSURE: 108 MMHG | HEART RATE: 62 BPM | TEMPERATURE: 98 F | RESPIRATION RATE: 14 BRPM | DIASTOLIC BLOOD PRESSURE: 60 MMHG | WEIGHT: 216 LBS

## 2022-05-05 DIAGNOSIS — Z01.818 PREOP TESTING: Primary | ICD-10-CM

## 2022-05-05 DIAGNOSIS — N20.1 RIGHT URETERAL STONE: ICD-10-CM

## 2022-05-05 LAB
GLUCOSE BLDC GLUCOMTR-MCNC: 129 MG/DL (ref 70–99)
GLUCOSE BLDC GLUCOMTR-MCNC: 131 MG/DL (ref 70–99)

## 2022-05-05 PROCEDURE — 82365 CALCULUS SPECTROSCOPY: CPT | Performed by: UROLOGY

## 2022-05-05 PROCEDURE — 82962 GLUCOSE BLOOD TEST: CPT

## 2022-05-05 PROCEDURE — 0T768DZ DILATION OF RIGHT URETER WITH INTRALUMINAL DEVICE, VIA NATURAL OR ARTIFICIAL OPENING ENDOSCOPIC: ICD-10-PCS | Performed by: UROLOGY

## 2022-05-05 PROCEDURE — BT1DZZZ FLUOROSCOPY OF RIGHT KIDNEY, URETER AND BLADDER: ICD-10-PCS | Performed by: UROLOGY

## 2022-05-05 PROCEDURE — 0TF68ZZ FRAGMENTATION IN RIGHT URETER, VIA NATURAL OR ARTIFICIAL OPENING ENDOSCOPIC: ICD-10-PCS | Performed by: UROLOGY

## 2022-05-05 PROCEDURE — 88300 SURGICAL PATH GROSS: CPT | Performed by: UROLOGY

## 2022-05-05 DEVICE — URETERAL STENT
Type: IMPLANTABLE DEVICE | Site: URETER | Status: FUNCTIONAL
Brand: ASCERTA™

## 2022-05-05 RX ORDER — LIDOCAINE HYDROCHLORIDE 20 MG/ML
JELLY TOPICAL AS NEEDED
Status: DISCONTINUED | OUTPATIENT
Start: 2022-05-05 | End: 2022-05-05 | Stop reason: HOSPADM

## 2022-05-05 RX ORDER — MORPHINE SULFATE 4 MG/ML
2 INJECTION, SOLUTION INTRAMUSCULAR; INTRAVENOUS EVERY 10 MIN PRN
Status: DISCONTINUED | OUTPATIENT
Start: 2022-05-05 | End: 2022-05-05

## 2022-05-05 RX ORDER — NICOTINE POLACRILEX 4 MG
15 LOZENGE BUCCAL
Status: DISCONTINUED | OUTPATIENT
Start: 2022-05-05 | End: 2022-05-05

## 2022-05-05 RX ORDER — METOCLOPRAMIDE 10 MG/1
10 TABLET ORAL ONCE
Status: COMPLETED | OUTPATIENT
Start: 2022-05-05 | End: 2022-05-05

## 2022-05-05 RX ORDER — SODIUM CHLORIDE, SODIUM LACTATE, POTASSIUM CHLORIDE, CALCIUM CHLORIDE 600; 310; 30; 20 MG/100ML; MG/100ML; MG/100ML; MG/100ML
INJECTION, SOLUTION INTRAVENOUS CONTINUOUS
Status: DISCONTINUED | OUTPATIENT
Start: 2022-05-05 | End: 2022-05-05

## 2022-05-05 RX ORDER — MORPHINE SULFATE 4 MG/ML
4 INJECTION, SOLUTION INTRAMUSCULAR; INTRAVENOUS EVERY 10 MIN PRN
Status: DISCONTINUED | OUTPATIENT
Start: 2022-05-05 | End: 2022-05-05

## 2022-05-05 RX ORDER — HYDROMORPHONE HYDROCHLORIDE 1 MG/ML
0.2 INJECTION, SOLUTION INTRAMUSCULAR; INTRAVENOUS; SUBCUTANEOUS EVERY 5 MIN PRN
Status: DISCONTINUED | OUTPATIENT
Start: 2022-05-05 | End: 2022-05-05

## 2022-05-05 RX ORDER — METOPROLOL TARTRATE 5 MG/5ML
2.5 INJECTION INTRAVENOUS ONCE
Status: DISCONTINUED | OUTPATIENT
Start: 2022-05-05 | End: 2022-05-05

## 2022-05-05 RX ORDER — NALOXONE HYDROCHLORIDE 0.4 MG/ML
80 INJECTION, SOLUTION INTRAMUSCULAR; INTRAVENOUS; SUBCUTANEOUS AS NEEDED
Status: DISCONTINUED | OUTPATIENT
Start: 2022-05-05 | End: 2022-05-05

## 2022-05-05 RX ORDER — CEFADROXIL 500 MG/1
500 CAPSULE ORAL 2 TIMES DAILY
Qty: 6 CAPSULE | Refills: 0 | Status: SHIPPED | OUTPATIENT
Start: 2022-05-06 | End: 2022-05-09

## 2022-05-05 RX ORDER — ACETAMINOPHEN 500 MG
1000 TABLET ORAL ONCE
Status: COMPLETED | OUTPATIENT
Start: 2022-05-05 | End: 2022-05-05

## 2022-05-05 RX ORDER — PHENAZOPYRIDINE HYDROCHLORIDE 200 MG/1
200 TABLET, FILM COATED ORAL ONCE
Status: COMPLETED | OUTPATIENT
Start: 2022-05-05 | End: 2022-05-05

## 2022-05-05 RX ORDER — EPHEDRINE SULFATE 50 MG/ML
INJECTION INTRAVENOUS AS NEEDED
Status: DISCONTINUED | OUTPATIENT
Start: 2022-05-05 | End: 2022-05-05 | Stop reason: SURG

## 2022-05-05 RX ORDER — MORPHINE SULFATE 10 MG/ML
6 INJECTION, SOLUTION INTRAMUSCULAR; INTRAVENOUS EVERY 10 MIN PRN
Status: DISCONTINUED | OUTPATIENT
Start: 2022-05-05 | End: 2022-05-05

## 2022-05-05 RX ORDER — PHENAZOPYRIDINE HYDROCHLORIDE 200 MG/1
200 TABLET, FILM COATED ORAL ONCE
Status: CANCELLED | OUTPATIENT
Start: 2022-05-05 | End: 2022-05-05

## 2022-05-05 RX ORDER — DEXAMETHASONE SODIUM PHOSPHATE 4 MG/ML
VIAL (ML) INJECTION AS NEEDED
Status: DISCONTINUED | OUTPATIENT
Start: 2022-05-05 | End: 2022-05-05 | Stop reason: SURG

## 2022-05-05 RX ORDER — PHENAZOPYRIDINE HYDROCHLORIDE 200 MG/1
200 TABLET, FILM COATED ORAL 3 TIMES DAILY PRN
Qty: 10 TABLET | Refills: 0 | Status: SHIPPED | OUTPATIENT
Start: 2022-05-05

## 2022-05-05 RX ORDER — LIDOCAINE HYDROCHLORIDE 10 MG/ML
INJECTION, SOLUTION EPIDURAL; INFILTRATION; INTRACAUDAL; PERINEURAL AS NEEDED
Status: DISCONTINUED | OUTPATIENT
Start: 2022-05-05 | End: 2022-05-05 | Stop reason: SURG

## 2022-05-05 RX ORDER — ONDANSETRON 2 MG/ML
INJECTION INTRAMUSCULAR; INTRAVENOUS AS NEEDED
Status: DISCONTINUED | OUTPATIENT
Start: 2022-05-05 | End: 2022-05-05 | Stop reason: SURG

## 2022-05-05 RX ORDER — HYDROMORPHONE HYDROCHLORIDE 1 MG/ML
0.6 INJECTION, SOLUTION INTRAMUSCULAR; INTRAVENOUS; SUBCUTANEOUS EVERY 5 MIN PRN
Status: DISCONTINUED | OUTPATIENT
Start: 2022-05-05 | End: 2022-05-05

## 2022-05-05 RX ORDER — SODIUM CHLORIDE 9 MG/ML
INJECTION, SOLUTION INTRAVENOUS CONTINUOUS
Status: DISCONTINUED | OUTPATIENT
Start: 2022-05-05 | End: 2022-05-05

## 2022-05-05 RX ORDER — HYDROMORPHONE HYDROCHLORIDE 1 MG/ML
0.4 INJECTION, SOLUTION INTRAMUSCULAR; INTRAVENOUS; SUBCUTANEOUS EVERY 5 MIN PRN
Status: DISCONTINUED | OUTPATIENT
Start: 2022-05-05 | End: 2022-05-05

## 2022-05-05 RX ORDER — DEXTROSE MONOHYDRATE 25 G/50ML
50 INJECTION, SOLUTION INTRAVENOUS
Status: DISCONTINUED | OUTPATIENT
Start: 2022-05-05 | End: 2022-05-05

## 2022-05-05 RX ORDER — NICOTINE POLACRILEX 4 MG
30 LOZENGE BUCCAL
Status: DISCONTINUED | OUTPATIENT
Start: 2022-05-05 | End: 2022-05-05

## 2022-05-05 RX ORDER — FAMOTIDINE 20 MG/1
20 TABLET, FILM COATED ORAL ONCE
Status: COMPLETED | OUTPATIENT
Start: 2022-05-05 | End: 2022-05-05

## 2022-05-05 RX ADMIN — LIDOCAINE HYDROCHLORIDE 50 MG: 10 INJECTION, SOLUTION EPIDURAL; INFILTRATION; INTRACAUDAL; PERINEURAL at 12:52:00

## 2022-05-05 RX ADMIN — LIDOCAINE HYDROCHLORIDE 50 MG: 10 INJECTION, SOLUTION EPIDURAL; INFILTRATION; INTRACAUDAL; PERINEURAL at 13:20:00

## 2022-05-05 RX ADMIN — EPHEDRINE SULFATE 5 MG: 50 INJECTION INTRAVENOUS at 13:11:00

## 2022-05-05 RX ADMIN — DEXAMETHASONE SODIUM PHOSPHATE 4 MG: 4 MG/ML VIAL (ML) INJECTION at 12:52:00

## 2022-05-05 RX ADMIN — ONDANSETRON 4 MG: 2 INJECTION INTRAMUSCULAR; INTRAVENOUS at 12:52:00

## 2022-05-05 NOTE — ANESTHESIA PROCEDURE NOTES
Airway  Date/Time: 5/5/2022 12:52 PM  Urgency: Elective    Airway not difficult    General Information and Staff    Patient location during procedure: OR  Anesthesiologist: Loyda Chiang MD  Performed: anesthesiologist     Indications and Patient Condition  Indications for airway management: anesthesia  Sedation level: deep  Preoxygenated: yes  Patient position: sniffing  Mask difficulty assessment: 1 - vent by mask    Final Airway Details  Final airway type: supraglottic airway      Successful airway: classic  Size 5      Number of attempts at approach: 1

## 2022-05-05 NOTE — TELEPHONE ENCOUNTER
Urology staff this patient is a cystoscopy in the office and stent removal.  He does not need an x-ray before. I know he is leaving town on vacation around the 18th and it would be great to be able to fit him into remove his stent prior to that.

## 2022-05-05 NOTE — INTERVAL H&P NOTE
Pre-op Diagnosis: Right ureteral stone [N20.1]    The above referenced H&P was reviewed by Montserrat Adam MD on 5/5/2022, the patient was examined and no significant changes have occurred in the patient's condition since the H&P was performed. I discussed with the patient and/or legal representative the potential benefits, risks and side effects of this procedure; the likelihood of the patient achieving goals; and potential problems that might occur during recuperation. I discussed reasonable alternatives to the procedure, including risks, benefits and side effects related to the alternatives and risks related to not receiving this procedure. We will proceed with procedure as planned.

## 2022-05-05 NOTE — ANESTHESIA POSTPROCEDURE EVALUATION
Patient: Deborah Arredondo    Procedure Summary     Date: 05/05/22 Room / Location: [address] [place] [address] MAIN OR 14 / [address] [place] [address] MAIN OR    Anesthesia Start: 3518 Anesthesia Stop: 1330    Procedures:       CYSTOSCOPY, RIGHT RETROGRADE PYELOGRAM, RIGHT URETEROSCOPY, LASER HOLMIUM LITHOTRIPSY, RIGHT STENT PLACEMENT (Right )      CYSTOSCOPY URETEROSCOPY (Right )      LASER HOLMIUM LITHOTRIPSY (Right )      CYSTOSCOPY STENT INSERTION (Right ) Diagnosis:       Right ureteral stone      (Right ureteral stone [N20.1])    Surgeons: Yomaira Yeboah MD Anesthesiologist: Hermilo Ferrell MD    Anesthesia Type: general ASA Status: 3          Anesthesia Type: general    Vitals Value Taken Time   /72 05/05/22 1330   Temp  05/05/22 1330   Pulse 64 05/05/22 1330   Resp 13 05/05/22 1330   SpO2 95 % 05/05/22 1330   Vitals shown include unvalidated device data.     [address] [place] [address] AN Post Evaluation:   Patient Evaluated in PACU  Patient Participation: complete - patient participated  Level of Consciousness: awake  Pain Management: adequate  Airway Patency:patent  Yes    Cardiovascular Status: acceptable  Respiratory Status: acceptable  Postoperative Hydration acceptable      Davin Bach MD  5/5/2022 1:30 PM

## 2022-05-06 ENCOUNTER — PATIENT MESSAGE (OUTPATIENT)
Dept: PULMONOLOGY | Facility: CLINIC | Age: 67
End: 2022-05-06

## 2022-05-09 NOTE — TELEPHONE ENCOUNTER
-S/w pt; identity verified with name & .  -Pt agreeable to 22 (7:45am arrival) appt for Cysto/ stent removal.  -He reports \"my symptoms are improving re: burning & right kidney pain with urination. \"  -Pt is having regular/ daily BMs.  -We discussed hydration; 6-8 oz hourly is sufficient.  -Encounter complete.

## 2022-05-12 ENCOUNTER — OFFICE VISIT (OUTPATIENT)
Dept: PHYSICAL THERAPY | Age: 67
End: 2022-05-12
Attending: FAMILY MEDICINE
Payer: MEDICARE

## 2022-05-12 PROCEDURE — 97110 THERAPEUTIC EXERCISES: CPT | Performed by: PHYSICAL THERAPIST

## 2022-05-13 ENCOUNTER — PROCEDURE (OUTPATIENT)
Dept: SURGERY | Facility: CLINIC | Age: 67
End: 2022-05-13
Payer: MEDICARE

## 2022-05-13 VITALS
SYSTOLIC BLOOD PRESSURE: 155 MMHG | DIASTOLIC BLOOD PRESSURE: 76 MMHG | BODY MASS INDEX: 33 KG/M2 | HEART RATE: 88 BPM | WEIGHT: 216 LBS

## 2022-05-13 DIAGNOSIS — N20.0 KIDNEY STONES: Primary | ICD-10-CM

## 2022-05-13 PROCEDURE — 99213 OFFICE O/P EST LOW 20 MIN: CPT | Performed by: UROLOGY

## 2022-05-13 PROCEDURE — 52310 CYSTOSCOPY AND TREATMENT: CPT | Performed by: UROLOGY

## 2022-05-13 RX ORDER — CIPROFLOXACIN 500 MG/1
500 TABLET, FILM COATED ORAL ONCE
Status: SHIPPED | OUTPATIENT
Start: 2022-05-13

## 2022-05-31 ENCOUNTER — APPOINTMENT (OUTPATIENT)
Dept: PHYSICAL THERAPY | Age: 67
End: 2022-05-31
Attending: FAMILY MEDICINE
Payer: MEDICARE

## 2022-06-02 ENCOUNTER — OFFICE VISIT (OUTPATIENT)
Dept: PHYSICAL THERAPY | Age: 67
End: 2022-06-02
Attending: FAMILY MEDICINE
Payer: MEDICARE

## 2022-06-02 PROCEDURE — 97110 THERAPEUTIC EXERCISES: CPT | Performed by: PHYSICAL THERAPIST

## 2022-06-09 ENCOUNTER — OFFICE VISIT (OUTPATIENT)
Dept: PHYSICAL THERAPY | Age: 67
End: 2022-06-09
Attending: FAMILY MEDICINE
Payer: MEDICARE

## 2022-06-09 PROCEDURE — 97110 THERAPEUTIC EXERCISES: CPT | Performed by: PHYSICAL THERAPIST

## 2022-06-16 ENCOUNTER — OFFICE VISIT (OUTPATIENT)
Dept: PHYSICAL THERAPY | Age: 67
End: 2022-06-16
Attending: FAMILY MEDICINE
Payer: MEDICARE

## 2022-06-16 PROCEDURE — 97110 THERAPEUTIC EXERCISES: CPT | Performed by: PHYSICAL THERAPIST

## 2022-06-22 ENCOUNTER — OFFICE VISIT (OUTPATIENT)
Dept: PHYSICAL THERAPY | Age: 67
End: 2022-06-22
Attending: FAMILY MEDICINE
Payer: MEDICARE

## 2022-06-22 ENCOUNTER — HOSPITAL ENCOUNTER (OUTPATIENT)
Dept: ULTRASOUND IMAGING | Age: 67
Discharge: HOME | End: 2022-06-22
Attending: UROLOGY
Payer: MEDICARE

## 2022-06-22 DIAGNOSIS — N20.0 KIDNEY STONES: ICD-10-CM

## 2022-06-22 PROCEDURE — 97110 THERAPEUTIC EXERCISES: CPT | Performed by: PHYSICAL THERAPIST

## 2022-06-22 PROCEDURE — 76775 US EXAM ABDO BACK WALL LIM: CPT | Performed by: UROLOGY

## 2022-06-30 ENCOUNTER — APPOINTMENT (OUTPATIENT)
Dept: PHYSICAL THERAPY | Age: 67
End: 2022-06-30
Attending: FAMILY MEDICINE
Payer: MEDICARE

## 2022-07-05 ENCOUNTER — OFFICE VISIT (OUTPATIENT)
Dept: PHYSICAL THERAPY | Age: 67
End: 2022-07-05
Attending: FAMILY MEDICINE
Payer: MEDICARE

## 2022-07-05 PROCEDURE — 97110 THERAPEUTIC EXERCISES: CPT | Performed by: PHYSICAL THERAPIST

## 2022-07-07 ENCOUNTER — APPOINTMENT (OUTPATIENT)
Dept: PHYSICAL THERAPY | Age: 67
End: 2022-07-07
Attending: FAMILY MEDICINE
Payer: MEDICARE

## 2022-07-08 ENCOUNTER — OFFICE VISIT (OUTPATIENT)
Dept: SURGERY | Facility: CLINIC | Age: 67
End: 2022-07-08
Payer: MEDICARE

## 2022-07-08 DIAGNOSIS — N20.0 KIDNEY STONES: Primary | ICD-10-CM

## 2022-07-08 DIAGNOSIS — R35.0 URINARY FREQUENCY: ICD-10-CM

## 2022-07-08 PROCEDURE — 99213 OFFICE O/P EST LOW 20 MIN: CPT | Performed by: UROLOGY

## 2022-08-22 DIAGNOSIS — E78.2 MIXED HYPERLIPIDEMIA: ICD-10-CM

## 2022-08-22 RX ORDER — ROSUVASTATIN CALCIUM 20 MG/1
20 TABLET, COATED ORAL NIGHTLY
Qty: 90 TABLET | Refills: 1 | Status: SHIPPED | OUTPATIENT
Start: 2022-08-22

## 2022-09-01 ENCOUNTER — OFFICE VISIT (OUTPATIENT)
Dept: INTEGRATIVE MEDICINE | Facility: CLINIC | Age: 67
End: 2022-09-01
Payer: MEDICARE

## 2022-09-01 VITALS
DIASTOLIC BLOOD PRESSURE: 66 MMHG | HEART RATE: 78 BPM | HEIGHT: 68 IN | OXYGEN SATURATION: 97 % | WEIGHT: 213.63 LBS | BODY MASS INDEX: 32.38 KG/M2 | SYSTOLIC BLOOD PRESSURE: 112 MMHG

## 2022-09-01 DIAGNOSIS — I25.9 CHRONIC ISCHEMIC HEART DISEASE: ICD-10-CM

## 2022-09-01 DIAGNOSIS — E78.2 MIXED HYPERLIPIDEMIA: ICD-10-CM

## 2022-09-01 DIAGNOSIS — I10 HYPERTENSION, ESSENTIAL: ICD-10-CM

## 2022-09-01 DIAGNOSIS — D64.9 ANEMIA, UNSPECIFIED TYPE: ICD-10-CM

## 2022-09-01 DIAGNOSIS — E11.9 TYPE 2 DIABETES MELLITUS WITHOUT COMPLICATION, WITHOUT LONG-TERM CURRENT USE OF INSULIN (HCC): Primary | ICD-10-CM

## 2022-09-01 PROCEDURE — 99214 OFFICE O/P EST MOD 30 MIN: CPT | Performed by: FAMILY MEDICINE

## 2022-09-01 RX ORDER — GLIPIZIDE 5 MG/1
5 TABLET, FILM COATED, EXTENDED RELEASE ORAL DAILY
Qty: 90 TABLET | Refills: 0 | Status: SHIPPED | OUTPATIENT
Start: 2022-09-01

## 2022-09-20 DIAGNOSIS — E78.2 MIXED HYPERLIPIDEMIA: ICD-10-CM

## 2022-09-21 RX ORDER — FENOFIBRATE 145 MG/1
TABLET, COATED ORAL
Qty: 90 TABLET | Refills: 1 | Status: SHIPPED | OUTPATIENT
Start: 2022-09-21

## 2022-09-22 RX ORDER — AMLODIPINE BESYLATE AND BENAZEPRIL HYDROCHLORIDE 10; 40 MG/1; MG/1
1 CAPSULE ORAL DAILY
Qty: 90 CAPSULE | Refills: 1 | Status: SHIPPED | OUTPATIENT
Start: 2022-09-22

## 2022-10-04 ENCOUNTER — TELEPHONE (OUTPATIENT)
Dept: SURGERY | Facility: CLINIC | Age: 67
End: 2022-10-04

## 2022-10-04 ENCOUNTER — LAB ENCOUNTER (OUTPATIENT)
Dept: LAB | Facility: HOSPITAL | Age: 67
End: 2022-10-04
Attending: UROLOGY
Payer: MEDICARE

## 2022-10-04 DIAGNOSIS — R31.0 GROSS HEMATURIA: ICD-10-CM

## 2022-10-04 DIAGNOSIS — R31.0 GROSS HEMATURIA: Primary | ICD-10-CM

## 2022-10-04 LAB
BILIRUB UR QL CFM: NEGATIVE
COLOR UR: YELLOW
GLUCOSE UR-MCNC: NEGATIVE MG/DL
LEUKOCYTE ESTERASE UR QL STRIP.AUTO: NEGATIVE
NITRITE UR QL STRIP.AUTO: NEGATIVE
PH UR: 6 [PH] (ref 5–8)
RBC #/AREA URNS AUTO: >10 /HPF
RBC #/AREA URNS AUTO: >10 /HPF
SP GR UR STRIP: 1.02 (ref 1–1.03)
UROBILINOGEN UR STRIP-ACNC: 2

## 2022-10-04 PROCEDURE — 81001 URINALYSIS AUTO W/SCOPE: CPT

## 2022-10-04 PROCEDURE — 81015 MICROSCOPIC EXAM OF URINE: CPT

## 2022-10-04 PROCEDURE — 87086 URINE CULTURE/COLONY COUNT: CPT

## 2022-10-04 NOTE — TELEPHONE ENCOUNTER
Per pt started experiencing blood in urine last night. Stating color was a brown color and today it is a pinkish color.  Please advise

## 2022-10-04 NOTE — TELEPHONE ENCOUNTER
I s/w pt and determined that he noticed his urine was darker red/brown yesterday and he forced fluids and today the color is pink. Takes ASA 81 mg daily. Denies flank pain bilaterally, fever and chills or N/V. Has non-obstructing stones bilaterally as per last Renal US in 6/2022. Denies any difficulty passing urine. I asked pt to submit a urine sample at the lab for UA and C&S to also r/o possibility of UTI and I will send this msg to [place], [place] for his thoughts and we will get back to him if any further instructions or orders. Pt verbalized understanding and compliance.

## 2022-10-07 DIAGNOSIS — R31.0 GROSS HEMATURIA: Primary | ICD-10-CM

## 2022-10-08 ENCOUNTER — LAB ENCOUNTER (OUTPATIENT)
Dept: LAB | Facility: HOSPITAL | Age: 67
End: 2022-10-08
Attending: NURSE PRACTITIONER
Payer: MEDICARE

## 2022-10-08 LAB
BILIRUB UR QL: NEGATIVE
CLARITY UR: CLEAR
GLUCOSE UR-MCNC: 500 MG/DL
LEUKOCYTE ESTERASE UR QL STRIP.AUTO: NEGATIVE
NITRITE UR QL STRIP.AUTO: NEGATIVE
PH UR: 5.5 [PH] (ref 5–8)
RBC #/AREA URNS AUTO: >10 /HPF
RBC #/AREA URNS AUTO: >10 /HPF
SP GR UR STRIP: >=1.03 (ref 1–1.03)
UROBILINOGEN UR STRIP-ACNC: 0.2

## 2022-10-08 PROCEDURE — 87086 URINE CULTURE/COLONY COUNT: CPT | Performed by: UROLOGY

## 2022-10-08 PROCEDURE — 81001 URINALYSIS AUTO W/SCOPE: CPT | Performed by: UROLOGY

## 2022-10-08 PROCEDURE — 81015 MICROSCOPIC EXAM OF URINE: CPT | Performed by: UROLOGY

## 2022-10-11 ENCOUNTER — HOSPITAL ENCOUNTER (OUTPATIENT)
Dept: CT IMAGING | Facility: HOSPITAL | Age: 67
Discharge: HOME | End: 2022-10-11
Attending: NURSE PRACTITIONER
Payer: MEDICARE

## 2022-10-11 DIAGNOSIS — R31.0 GROSS HEMATURIA: ICD-10-CM

## 2022-10-11 LAB
CREAT BLD-MCNC: 1.1 MG/DL
GFR SERPLBLD BASED ON 1.73 SQ M-ARVRAT: 74 ML/MIN/1.73M2 (ref 60–?)

## 2022-10-11 PROCEDURE — 82565 ASSAY OF CREATININE: CPT

## 2022-10-11 PROCEDURE — 74178 CT ABD&PLV WO CNTR FLWD CNTR: CPT | Performed by: NURSE PRACTITIONER

## 2022-10-11 PROCEDURE — 76377 3D RENDER W/INTRP POSTPROCES: CPT | Performed by: NURSE PRACTITIONER

## 2022-10-11 RX ORDER — TAMSULOSIN HYDROCHLORIDE 0.4 MG/1
0.4 CAPSULE ORAL DAILY
Qty: 30 CAPSULE | Refills: 0 | Status: SHIPPED | OUTPATIENT
Start: 2022-10-11 | End: 2022-11-10

## 2022-10-14 ENCOUNTER — OFFICE VISIT (OUTPATIENT)
Dept: SURGERY | Facility: CLINIC | Age: 67
End: 2022-10-14
Payer: MEDICARE

## 2022-10-14 DIAGNOSIS — R31.0 GROSS HEMATURIA: ICD-10-CM

## 2022-10-14 DIAGNOSIS — N20.0 KIDNEY STONES: Primary | ICD-10-CM

## 2022-10-14 PROCEDURE — 99214 OFFICE O/P EST MOD 30 MIN: CPT | Performed by: UROLOGY

## 2022-10-14 PROCEDURE — 1126F AMNT PAIN NOTED NONE PRSNT: CPT | Performed by: UROLOGY

## 2022-10-17 ENCOUNTER — TELEPHONE (OUTPATIENT)
Dept: SURGERY | Facility: CLINIC | Age: 67
End: 2022-10-17

## 2022-10-17 DIAGNOSIS — R31.0 GROSS HEMATURIA: ICD-10-CM

## 2022-10-17 DIAGNOSIS — N20.0 RIGHT KIDNEY STONE: Primary | ICD-10-CM

## 2022-10-19 NOTE — TELEPHONE ENCOUNTER
Luigi Rees per our conversation please provide anesthesia, oncor.  Please see  procedure below:      Spoke with patient, scheduled cystoscopy, right retrograde pyelography, right ureteroscopy holmium laser lithotripsy and right stent placement, Wednesday 10/26/2022, went over pre-op instructions, no labs needed all questions answered,

## 2022-10-24 ENCOUNTER — LAB ENCOUNTER (OUTPATIENT)
Dept: LAB | Age: 67
End: 2022-10-24
Attending: UROLOGY
Payer: MEDICARE

## 2022-10-24 DIAGNOSIS — Z01.818 PRE-OP TESTING: ICD-10-CM

## 2022-10-24 LAB — SARS-COV-2 RNA RESP QL NAA+PROBE: NOT DETECTED

## 2022-10-24 RX ORDER — MULTIVIT WITH MINERALS/LUTEIN
1000 TABLET ORAL DAILY
COMMUNITY

## 2022-10-24 RX ORDER — B-COMPLEX WITH VITAMIN C
TABLET ORAL EVERY MORNING
COMMUNITY

## 2022-10-24 RX ORDER — CHOLECALCIFEROL (VITAMIN D3) 125 MCG
2000 CAPSULE ORAL NIGHTLY
COMMUNITY

## 2022-10-24 RX ORDER — GLUCOSAM/CHONDRO/HERB 149/HYAL 750-100 MG
TABLET ORAL NIGHTLY
COMMUNITY

## 2022-10-24 RX ORDER — MELATONIN
325 NIGHTLY
COMMUNITY

## 2022-10-25 ENCOUNTER — TELEPHONE (OUTPATIENT)
Dept: INTEGRATIVE MEDICINE | Facility: CLINIC | Age: 67
End: 2022-10-25

## 2022-10-25 NOTE — TELEPHONE ENCOUNTER
Patient requesting refill of Metformin and Metoprolol* almost out of medication - [facility]. He stated he will be leaving out of town on Friday for approx x1 week and will need this medication.     Thank you

## 2022-10-25 NOTE — DISCHARGE INSTRUCTIONS
HOME INSTRUCTIONS  Please avoid heavy lifting for 4 weeks. My office will call you to set up an appointment to remove your stent in 3 to 4 weeks. Avoid heavy lifting for at least 3 to 4 weeks. [facility] HOME CARE INSTRUCTIONS: POST-OP ANESTHESIA  The medication that you received for sedation or general anesthesia can last up to 24 hours. Your judgment and reflexes may be altered, even if you feel like your normal self. We Recommend:   Do not drive any motor vehicle or bicycle   Avoid mowing the lawn, playing sports, or working with power tools/applicances (power saws, electric knives or mixers)   That you have someone stay with you on your first night home   Do not drink alcohol or take sleeping pills or tranquilizers   Do not sign legal documents within 24 hours of your procedure   If you had a nerve block for your surgery, take extra care not to put any pressure on your arm or hand for 24 hours    It is normal:  For you to have a sore throat if you had a breathing tube during surgery (while you were asleep!). The sore throat should get better within 48 hours. You can gargle with warm salt water (1/2 tsp in 4 oz warm water) or use a throat lozenge for comfort  To feel muscle aches or soreness especially in the abdomen, chest or neck. The achy feeling should go away in the next 24 hours  To feel weak, sleepy or \"wiped out\". Your should start feeling better in the next 24 hours. To experience mild discomforts such as sore lip or tongue, headache, cramps, gas pains or a bloated feeling in your abdomen. To experience mild back pain or soreness for a day or two if you had spinal or epidural anesthesia. If you had laparoscopic surgery, to feel shoulder pain or discomfort on the day of surgery. For some patients to have nausea after surgery/anesthesia    If you feel nausea or experience vomiting:   Try to move around less.    Eat less than usual or drink only liquids until the next morning   Nausea should resolve in about 24 hours    If you have a problem when you are at home:    Call your surgeons office

## 2022-10-26 ENCOUNTER — TELEPHONE (OUTPATIENT)
Dept: INTEGRATIVE MEDICINE | Facility: CLINIC | Age: 67
End: 2022-10-26

## 2022-10-26 ENCOUNTER — ANESTHESIA (OUTPATIENT)
Dept: SURGERY | Facility: HOSPITAL | Age: 67
End: 2022-10-26
Payer: MEDICARE

## 2022-10-26 ENCOUNTER — ANESTHESIA EVENT (OUTPATIENT)
Dept: SURGERY | Facility: HOSPITAL | Age: 67
End: 2022-10-26
Payer: MEDICARE

## 2022-10-26 ENCOUNTER — TELEPHONE (OUTPATIENT)
Dept: SURGERY | Facility: CLINIC | Age: 67
End: 2022-10-26

## 2022-10-26 ENCOUNTER — HOSPITAL ENCOUNTER (OUTPATIENT)
Facility: HOSPITAL | Age: 67
Setting detail: HOSPITAL OUTPATIENT SURGERY
Discharge: HOME | End: 2022-10-26
Attending: UROLOGY | Admitting: UROLOGY
Payer: MEDICARE

## 2022-10-26 ENCOUNTER — APPOINTMENT (OUTPATIENT)
Dept: GENERAL RADIOLOGY | Facility: HOSPITAL | Age: 67
End: 2022-10-26
Attending: UROLOGY
Payer: MEDICARE

## 2022-10-26 VITALS
BODY MASS INDEX: 32.58 KG/M2 | SYSTOLIC BLOOD PRESSURE: 137 MMHG | HEART RATE: 66 BPM | HEIGHT: 68 IN | TEMPERATURE: 99 F | WEIGHT: 215 LBS | RESPIRATION RATE: 14 BRPM | OXYGEN SATURATION: 94 % | DIASTOLIC BLOOD PRESSURE: 71 MMHG

## 2022-10-26 DIAGNOSIS — Z01.818 PRE-OP TESTING: Primary | ICD-10-CM

## 2022-10-26 LAB
GLUCOSE BLDC GLUCOMTR-MCNC: 132 MG/DL (ref 70–99)
GLUCOSE BLDC GLUCOMTR-MCNC: 136 MG/DL (ref 70–99)

## 2022-10-26 PROCEDURE — 0TC38ZZ EXTIRPATION OF MATTER FROM RIGHT KIDNEY PELVIS, VIA NATURAL OR ARTIFICIAL OPENING ENDOSCOPIC: ICD-10-PCS | Performed by: UROLOGY

## 2022-10-26 PROCEDURE — 0T768DZ DILATION OF RIGHT URETER WITH INTRALUMINAL DEVICE, VIA NATURAL OR ARTIFICIAL OPENING ENDOSCOPIC: ICD-10-PCS | Performed by: UROLOGY

## 2022-10-26 PROCEDURE — 52356 CYSTO/URETERO W/LITHOTRIPSY: CPT | Performed by: UROLOGY

## 2022-10-26 DEVICE — URETERAL STENT
Type: IMPLANTABLE DEVICE | Status: FUNCTIONAL
Brand: ASCERTA™

## 2022-10-26 RX ORDER — SODIUM CHLORIDE 9 MG/ML
INJECTION, SOLUTION INTRAVENOUS CONTINUOUS
Status: DISCONTINUED | OUTPATIENT
Start: 2022-10-26 | End: 2022-10-26

## 2022-10-26 RX ORDER — ACETAMINOPHEN 500 MG
1000 TABLET ORAL ONCE
Status: COMPLETED | OUTPATIENT
Start: 2022-10-26 | End: 2022-10-26

## 2022-10-26 RX ORDER — DEXTROSE MONOHYDRATE 25 G/50ML
50 INJECTION, SOLUTION INTRAVENOUS
Status: DISCONTINUED | OUTPATIENT
Start: 2022-10-26 | End: 2022-10-26 | Stop reason: HOSPADM

## 2022-10-26 RX ORDER — PHENAZOPYRIDINE HYDROCHLORIDE 200 MG/1
200 TABLET, FILM COATED ORAL ONCE
Status: CANCELLED | OUTPATIENT
Start: 2022-10-26 | End: 2022-10-26

## 2022-10-26 RX ORDER — LIDOCAINE HYDROCHLORIDE 20 MG/ML
JELLY TOPICAL AS NEEDED
Status: DISCONTINUED | OUTPATIENT
Start: 2022-10-26 | End: 2022-10-26 | Stop reason: HOSPADM

## 2022-10-26 RX ORDER — CEFADROXIL 500 MG/1
500 CAPSULE ORAL 2 TIMES DAILY
Qty: 6 CAPSULE | Refills: 0 | Status: SHIPPED | OUTPATIENT
Start: 2022-10-27 | End: 2022-10-30

## 2022-10-26 RX ORDER — PHENAZOPYRIDINE HYDROCHLORIDE 200 MG/1
200 TABLET, FILM COATED ORAL 3 TIMES DAILY PRN
Qty: 10 TABLET | Refills: 0 | Status: SHIPPED | OUTPATIENT
Start: 2022-10-26

## 2022-10-26 RX ORDER — ROCURONIUM BROMIDE 10 MG/ML
INJECTION, SOLUTION INTRAVENOUS AS NEEDED
Status: DISCONTINUED | OUTPATIENT
Start: 2022-10-26 | End: 2022-10-26 | Stop reason: SURG

## 2022-10-26 RX ORDER — DEXTROSE MONOHYDRATE 25 G/50ML
50 INJECTION, SOLUTION INTRAVENOUS
Status: DISCONTINUED | OUTPATIENT
Start: 2022-10-26 | End: 2022-10-26

## 2022-10-26 RX ORDER — NICOTINE POLACRILEX 4 MG
30 LOZENGE BUCCAL
Status: DISCONTINUED | OUTPATIENT
Start: 2022-10-26 | End: 2022-10-26

## 2022-10-26 RX ORDER — ALBUTEROL SULFATE 90 UG/1
AEROSOL, METERED RESPIRATORY (INHALATION) AS NEEDED
Status: DISCONTINUED | OUTPATIENT
Start: 2022-10-26 | End: 2022-10-26 | Stop reason: SURG

## 2022-10-26 RX ORDER — NICOTINE POLACRILEX 4 MG
15 LOZENGE BUCCAL
Status: DISCONTINUED | OUTPATIENT
Start: 2022-10-26 | End: 2022-10-26 | Stop reason: HOSPADM

## 2022-10-26 RX ORDER — FAMOTIDINE 20 MG/1
20 TABLET, FILM COATED ORAL ONCE
Status: DISCONTINUED | OUTPATIENT
Start: 2022-10-26 | End: 2022-10-26 | Stop reason: HOSPADM

## 2022-10-26 RX ORDER — NALOXONE HYDROCHLORIDE 0.4 MG/ML
80 INJECTION, SOLUTION INTRAMUSCULAR; INTRAVENOUS; SUBCUTANEOUS AS NEEDED
Status: DISCONTINUED | OUTPATIENT
Start: 2022-10-26 | End: 2022-10-26

## 2022-10-26 RX ORDER — EPHEDRINE SULFATE 50 MG/ML
INJECTION, SOLUTION INTRAVENOUS AS NEEDED
Status: DISCONTINUED | OUTPATIENT
Start: 2022-10-26 | End: 2022-10-26 | Stop reason: SURG

## 2022-10-26 RX ORDER — NICOTINE POLACRILEX 4 MG
30 LOZENGE BUCCAL
Status: DISCONTINUED | OUTPATIENT
Start: 2022-10-26 | End: 2022-10-26 | Stop reason: HOSPADM

## 2022-10-26 RX ORDER — NICOTINE POLACRILEX 4 MG
15 LOZENGE BUCCAL
Status: DISCONTINUED | OUTPATIENT
Start: 2022-10-26 | End: 2022-10-26

## 2022-10-26 RX ORDER — SODIUM CHLORIDE, SODIUM LACTATE, POTASSIUM CHLORIDE, CALCIUM CHLORIDE 600; 310; 30; 20 MG/100ML; MG/100ML; MG/100ML; MG/100ML
INJECTION, SOLUTION INTRAVENOUS CONTINUOUS
Status: DISCONTINUED | OUTPATIENT
Start: 2022-10-26 | End: 2022-10-26

## 2022-10-26 RX ORDER — METOCLOPRAMIDE 10 MG/1
10 TABLET ORAL ONCE
Status: COMPLETED | OUTPATIENT
Start: 2022-10-26 | End: 2022-10-26

## 2022-10-26 RX ADMIN — EPHEDRINE SULFATE 10 MG: 50 INJECTION, SOLUTION INTRAVENOUS at 13:13:00

## 2022-10-26 RX ADMIN — ROCURONIUM BROMIDE 20 MG: 10 INJECTION, SOLUTION INTRAVENOUS at 13:01:00

## 2022-10-26 RX ADMIN — EPHEDRINE SULFATE 15 MG: 50 INJECTION, SOLUTION INTRAVENOUS at 13:24:00

## 2022-10-26 RX ADMIN — EPHEDRINE SULFATE 10 MG: 50 INJECTION, SOLUTION INTRAVENOUS at 13:12:00

## 2022-10-26 RX ADMIN — ALBUTEROL SULFATE 2 PUFF: 90 AEROSOL, METERED RESPIRATORY (INHALATION) at 13:47:00

## 2022-10-26 RX ADMIN — ROCURONIUM BROMIDE 30 MG: 10 INJECTION, SOLUTION INTRAVENOUS at 13:27:00

## 2022-10-26 RX ADMIN — SODIUM CHLORIDE: 9 INJECTION, SOLUTION INTRAVENOUS at 13:45:00

## 2022-10-26 NOTE — ANESTHESIA PROCEDURE NOTES
Airway  Date/Time: 10/26/2022 1:04 PM  Urgency: Elective    Airway not difficult    General Information and Staff    Patient location during procedure: OR  Anesthesiologist: Scott Lacey MD  Performed: anesthesiologist     Indications and Patient Condition  Indications for airway management: anesthesia  Sedation level: deep  Preoxygenated: yes  Patient position: sniffing  Mask difficulty assessment: 1 - vent by mask    Final Airway Details  Final airway type: endotracheal airway      Successful airway: ETT  Cuffed: yes   Successful intubation technique: Video laryngoscopy  Endotracheal tube insertion site: oral    Placement verified by: chest auscultation and capnometry   Measured from: teeth  Number of attempts at approach: 1

## 2022-10-26 NOTE — TELEPHONE ENCOUNTER
Urology staff,  This patient needs a cystoscopy and stent removal in the office in 3 to 4 weeks. Please call the patient and schedule. No x-rays are needed.

## 2022-10-26 NOTE — INTERVAL H&P NOTE
Pre-op Diagnosis: Right kidney stone [N20.0]  Gross hematuria [R31.0]    The above referenced H&P was reviewed by Alem Silva MD on 10/26/2022, the patient was examined and no significant changes have occurred in the patient's condition since the H&P was performed. I discussed with the patient and/or legal representative the potential benefits, risks and side effects of this procedure; the likelihood of the patient achieving goals; and potential problems that might occur during recuperation. I discussed reasonable alternatives to the procedure, including risks, benefits and side effects related to the alternatives and risks related to not receiving this procedure. We will proceed with procedure as planned.

## 2022-10-26 NOTE — ANESTHESIA POSTPROCEDURE EVALUATION
Patient: Remi Pires    Procedure Summary     Date: 10/26/22 Room / Location: [address] [place] [address] MAIN OR 14 / [address] [place] [address] MAIN OR    Anesthesia Start: 2986 Anesthesia Stop: 1400    Procedure: cystoscopy, right retrograde pyelography, right ureteroscopy holmium laser lithotripsy and right stent placement (Right: Ureter) Diagnosis:       Right kidney stone      Gross hematuria      (Right kidney stone [N20. 0]Gross hematuria [R31.0])    Surgeons: Nadia Guerra MD Anesthesiologist: Rafita Musa CRNA    Anesthesia Type: general ASA Status: 3          Anesthesia Type: general    Vitals Value Taken Time   /77 10/26/22 1401   Temp 97.2 10/26/22 1401   Pulse 66 10/26/22 1401   Resp 22 10/26/22 1401   SpO2 95 % 10/26/22 1401   Vitals shown include unvalidated device data.     [address] [place] [address] AN Post Evaluation:   Patient Evaluated in PACU  Patient Participation: complete - patient participated  Level of Consciousness: awake  Pain Score: 0  Pain Management: adequate  Airway Patency:patent  Dental exam unchanged from preop  Yes    Cardiovascular Status: acceptable  Respiratory Status: acceptable and nasal cannula  Postoperative Hydration acceptable      Adrianna Hernández CRNA  10/26/2022 2:01 PM

## 2022-10-27 RX ORDER — METOPROLOL SUCCINATE 25 MG/1
25 TABLET, EXTENDED RELEASE ORAL DAILY
Qty: 90 TABLET | Refills: 1 | Status: SHIPPED | OUTPATIENT
Start: 2022-10-27

## 2022-10-27 NOTE — TELEPHONE ENCOUNTER
Spoke to patient scheduled for cysto stent removal for 11/22/22 at 930am advised patient to arrive at 9am agreed

## 2022-11-02 RX ORDER — TAMSULOSIN HYDROCHLORIDE 0.4 MG/1
0.4 CAPSULE ORAL DAILY
Qty: 30 CAPSULE | Refills: 0 | Status: SHIPPED | OUTPATIENT
Start: 2022-11-02 | End: 2022-12-02

## 2022-11-07 ENCOUNTER — PATIENT MESSAGE (OUTPATIENT)
Dept: SURGERY | Facility: CLINIC | Age: 67
End: 2022-11-07

## 2022-11-07 NOTE — TELEPHONE ENCOUNTER
From: Filipe Frausto  To: Bao Alexandre MD  Sent: 11/7/2022 1:12 PM CST  Subject: Stent removal and medication    I am scheduled for stent removal on 11/22/22. As I recall, this is an office procedure that does not take much time and did not involve anesthesia other than lidocaine gel. I believe that I was able to drive myself to and from the procedure, I did not have to withhold my regular medications or fast, and that there was minimal, if any, restrictions afterwards. I wanted to confirm that I am correct as I plan to fly to [place] for Thanksgiving. Also, I was surprised to get a call from my pharmacy about a refil of tamsulosin. I thought you told me that I could discontinue this medication after the lithotripsy. Please let me know if I am mistaken.

## 2022-11-22 ENCOUNTER — PROCEDURE (OUTPATIENT)
Dept: SURGERY | Facility: CLINIC | Age: 67
End: 2022-11-22
Payer: MEDICARE

## 2022-11-22 VITALS — DIASTOLIC BLOOD PRESSURE: 77 MMHG | SYSTOLIC BLOOD PRESSURE: 146 MMHG | HEART RATE: 77 BPM

## 2022-11-22 DIAGNOSIS — N20.0 KIDNEY STONES: Primary | ICD-10-CM

## 2022-11-22 PROCEDURE — 52310 CYSTOSCOPY AND TREATMENT: CPT | Performed by: UROLOGY

## 2022-11-22 RX ORDER — CIPROFLOXACIN 500 MG/1
500 TABLET, FILM COATED ORAL ONCE
Status: COMPLETED | OUTPATIENT
Start: 2022-11-22 | End: 2022-11-22

## 2022-11-22 RX ADMIN — CIPROFLOXACIN 500 MG: 500 TABLET, FILM COATED ORAL at 10:05:00

## 2022-11-28 RX ORDER — GLIPIZIDE 5 MG/1
5 TABLET, FILM COATED, EXTENDED RELEASE ORAL DAILY
Qty: 90 TABLET | Refills: 0 | Status: SHIPPED | OUTPATIENT
Start: 2022-11-28

## 2022-11-29 NOTE — TELEPHONE ENCOUNTER
A refill request was received for:  Requested Prescriptions     Pending Prescriptions Disp Refills    GLIPIZIDE ER 5 MG Oral Tablet 24 Hr [Pharmacy Med Name: GLIPIZIDE ER 5 MG TABLET] 90 tablet 0     Sig: Take 1 tablet (5 mg total) by mouth daily.      Last refill date:  09/01/2022  Qty: 90  Last office visit: 09/01/2022   When is follow up due: 03/20/2023     Future Appointments   Date Time Provider James Iverson   12/16/2022  6:00 PM [address] EM [place]   12/22/2022  1:30 PM Fremont Ganser, MD [facility]   3/20/2023 11:30 AM DO [facility]   7/7/2023 10:30 AM Fremont Ganser, MD [facility]

## 2022-12-09 ENCOUNTER — PATIENT MESSAGE (OUTPATIENT)
Dept: INTEGRATIVE MEDICINE | Facility: CLINIC | Age: 67
End: 2022-12-09

## 2022-12-09 DIAGNOSIS — K21.9 GASTROESOPHAGEAL REFLUX DISEASE WITHOUT ESOPHAGITIS: Primary | ICD-10-CM

## 2022-12-09 RX ORDER — FAMOTIDINE 20 MG/1
20 TABLET, FILM COATED ORAL 2 TIMES DAILY
Qty: 180 TABLET | Refills: 1 | Status: CANCELLED
Start: 2022-12-09

## 2022-12-09 RX ORDER — FAMOTIDINE 20 MG/1
20 TABLET, FILM COATED ORAL 2 TIMES DAILY
Qty: 180 TABLET | Refills: 1 | Status: SHIPPED | OUTPATIENT
Start: 2022-12-09

## 2022-12-09 RX ORDER — ALBUTEROL SULFATE 90 UG/1
2 AEROSOL, METERED RESPIRATORY (INHALATION) EVERY 4 HOURS PRN
Qty: 18 G | Refills: 1 | Status: SHIPPED | OUTPATIENT
Start: 2022-12-09

## 2022-12-09 RX ORDER — ALBUTEROL SULFATE 90 UG/1
2 AEROSOL, METERED RESPIRATORY (INHALATION) EVERY 4 HOURS PRN
Qty: 18 G | Refills: 1 | Status: CANCELLED
Start: 2022-12-09

## 2022-12-12 ENCOUNTER — TELEPHONE (OUTPATIENT)
Dept: INTEGRATIVE MEDICINE | Facility: CLINIC | Age: 67
End: 2022-12-12

## 2022-12-12 RX ORDER — FAMOTIDINE 20 MG/1
20 TABLET, FILM COATED ORAL 2 TIMES DAILY
Qty: 180 TABLET | Refills: 0 | Status: CANCELLED | OUTPATIENT
Start: 2022-12-12

## 2022-12-16 ENCOUNTER — HOSPITAL ENCOUNTER (OUTPATIENT)
Dept: ULTRASOUND IMAGING | Age: 67
Discharge: HOME | End: 2022-12-16
Attending: UROLOGY
Payer: MEDICARE

## 2022-12-16 DIAGNOSIS — N20.0 KIDNEY STONES: ICD-10-CM

## 2022-12-16 PROCEDURE — 76775 US EXAM ABDO BACK WALL LIM: CPT | Performed by: UROLOGY

## 2022-12-22 ENCOUNTER — OFFICE VISIT (OUTPATIENT)
Dept: SURGERY | Facility: CLINIC | Age: 67
End: 2022-12-22
Payer: MEDICARE

## 2022-12-22 DIAGNOSIS — N20.0 KIDNEY STONES: Primary | ICD-10-CM

## 2022-12-22 PROCEDURE — 99214 OFFICE O/P EST MOD 30 MIN: CPT | Performed by: UROLOGY

## 2022-12-28 ENCOUNTER — MED REC SCAN ONLY (OUTPATIENT)
Dept: INTEGRATIVE MEDICINE | Facility: CLINIC | Age: 67
End: 2022-12-28

## 2023-03-20 ENCOUNTER — OFFICE VISIT (OUTPATIENT)
Dept: INTEGRATIVE MEDICINE | Facility: CLINIC | Age: 68
End: 2023-03-20
Payer: MEDICARE

## 2023-03-20 VITALS
WEIGHT: 219.38 LBS | SYSTOLIC BLOOD PRESSURE: 124 MMHG | DIASTOLIC BLOOD PRESSURE: 80 MMHG | HEART RATE: 74 BPM | OXYGEN SATURATION: 97 % | BODY MASS INDEX: 33 KG/M2

## 2023-03-20 DIAGNOSIS — F10.11 NONDEPENDENT ALCOHOL ABUSE, IN REMISSION: ICD-10-CM

## 2023-03-20 DIAGNOSIS — G47.33 OSA ON CPAP: ICD-10-CM

## 2023-03-20 DIAGNOSIS — E66.9 OBESITY (BMI 30-39.9): ICD-10-CM

## 2023-03-20 DIAGNOSIS — I10 HYPERTENSION, ESSENTIAL: ICD-10-CM

## 2023-03-20 DIAGNOSIS — Z99.89 OSA ON CPAP: ICD-10-CM

## 2023-03-20 DIAGNOSIS — E78.2 MIXED HYPERLIPIDEMIA: ICD-10-CM

## 2023-03-20 DIAGNOSIS — K21.9 GASTROESOPHAGEAL REFLUX DISEASE WITHOUT ESOPHAGITIS: ICD-10-CM

## 2023-03-20 DIAGNOSIS — E11.9 TYPE 2 DIABETES MELLITUS WITHOUT COMPLICATION, WITHOUT LONG-TERM CURRENT USE OF INSULIN (HCC): Primary | ICD-10-CM

## 2023-03-20 DIAGNOSIS — Z00.00 ENCOUNTER FOR ANNUAL HEALTH EXAMINATION: ICD-10-CM

## 2023-03-20 DIAGNOSIS — I25.10 CORONARY ARTERY DISEASE INVOLVING NATIVE CORONARY ARTERY OF NATIVE HEART WITHOUT ANGINA PECTORIS: ICD-10-CM

## 2023-03-20 DIAGNOSIS — Z12.5 SCREENING PSA (PROSTATE SPECIFIC ANTIGEN): ICD-10-CM

## 2023-03-20 DIAGNOSIS — Z79.82 LONG TERM CURRENT USE OF ASPIRIN: ICD-10-CM

## 2023-03-20 DIAGNOSIS — M25.561 ACUTE PAIN OF RIGHT KNEE: ICD-10-CM

## 2023-03-20 PROBLEM — E78.00 PURE HYPERCHOLESTEROLEMIA: Status: RESOLVED | Noted: 2020-03-17 | Resolved: 2023-03-20

## 2023-03-20 PROBLEM — I25.9 CHRONIC ISCHEMIC HEART DISEASE: Status: RESOLVED | Noted: 2019-01-21 | Resolved: 2023-03-20

## 2023-03-20 RX ORDER — SEMAGLUTIDE 1.34 MG/ML
1 INJECTION, SOLUTION SUBCUTANEOUS WEEKLY
COMMUNITY
Start: 2023-01-20

## 2023-03-20 NOTE — ASSESSMENT & PLAN NOTE
Not checking blood sugars at home, no symptoms at this time, lost 3 weeks of Ozempic 2/2 shortage and high demand. , recheck a1c today.

## 2023-03-29 DIAGNOSIS — E78.2 MIXED HYPERLIPIDEMIA: ICD-10-CM

## 2023-03-30 RX ORDER — BUPROPION HYDROCHLORIDE 200 MG/1
200 TABLET, EXTENDED RELEASE ORAL 2 TIMES DAILY
Qty: 60 TABLET | Refills: 0 | Status: SHIPPED | OUTPATIENT
Start: 2023-03-30

## 2023-03-30 RX ORDER — AMLODIPINE BESYLATE AND BENAZEPRIL HYDROCHLORIDE 10; 40 MG/1; MG/1
1 CAPSULE ORAL DAILY
Qty: 90 CAPSULE | Refills: 0 | Status: SHIPPED | OUTPATIENT
Start: 2023-03-30

## 2023-03-30 RX ORDER — FENOFIBRATE 145 MG/1
145 TABLET, COATED ORAL DAILY
Qty: 90 TABLET | Refills: 1 | Status: SHIPPED | OUTPATIENT
Start: 2023-03-30

## 2023-04-24 RX ORDER — ALBUTEROL SULFATE 90 UG/1
2 AEROSOL, METERED RESPIRATORY (INHALATION) EVERY 4 HOURS PRN
Qty: 18 G | Refills: 1 | Status: SHIPPED | OUTPATIENT
Start: 2023-04-24

## 2023-04-26 ENCOUNTER — PATIENT MESSAGE (OUTPATIENT)
Dept: INTEGRATIVE MEDICINE | Facility: CLINIC | Age: 68
End: 2023-04-26

## 2023-04-26 RX ORDER — BUPROPION HYDROCHLORIDE 200 MG/1
TABLET, EXTENDED RELEASE ORAL
Qty: 60 TABLET | Refills: 0 | Status: SHIPPED | OUTPATIENT
Start: 2023-04-26 | End: 2023-04-26

## 2023-04-26 RX ORDER — BUPROPION HYDROCHLORIDE 200 MG/1
200 TABLET, EXTENDED RELEASE ORAL 2 TIMES DAILY
Qty: 180 TABLET | Refills: 1 | Status: SHIPPED | OUTPATIENT
Start: 2023-04-26

## 2023-04-26 NOTE — TELEPHONE ENCOUNTER
A refill request was received for:  Requested Prescriptions     Pending Prescriptions Disp Refills    BUPROPION HCL ER, SR, 200 MG Oral Tablet 12 Hr [Pharmacy Med Name: BUPROPION HCL  MG TABLET] 60 tablet 0     Sig: TAKE 1 TABLET BY MOUTH TWICE A DAY     Last refill date:  04/02/2023  Qty: 60  Last office visit: 09/01/2023  When is follow up due: 03/01/2023    Future Appointments   Date Time Provider James Iverson   9/25/2023 11:00 AM Apolinar Brink, [facility] MED [address] [place] [address]

## 2023-06-27 DIAGNOSIS — K21.9 GASTROESOPHAGEAL REFLUX DISEASE WITHOUT ESOPHAGITIS: ICD-10-CM

## 2023-06-28 RX ORDER — FAMOTIDINE 20 MG/1
20 TABLET, FILM COATED ORAL 2 TIMES DAILY
Qty: 180 TABLET | Refills: 1 | Status: SHIPPED | OUTPATIENT
Start: 2023-06-28

## 2023-07-06 ENCOUNTER — OFFICE VISIT (OUTPATIENT)
Dept: INTEGRATIVE MEDICINE | Facility: CLINIC | Age: 68
End: 2023-07-06
Payer: MEDICARE

## 2023-07-06 VITALS
WEIGHT: 214 LBS | DIASTOLIC BLOOD PRESSURE: 80 MMHG | HEIGHT: 68 IN | BODY MASS INDEX: 32.43 KG/M2 | HEART RATE: 67 BPM | OXYGEN SATURATION: 99 % | SYSTOLIC BLOOD PRESSURE: 135 MMHG

## 2023-07-06 DIAGNOSIS — I25.10 CORONARY ARTERY DISEASE INVOLVING NATIVE CORONARY ARTERY OF NATIVE HEART WITHOUT ANGINA PECTORIS: ICD-10-CM

## 2023-07-06 DIAGNOSIS — I10 HYPERTENSION, ESSENTIAL: ICD-10-CM

## 2023-07-06 DIAGNOSIS — J45.909 ASTHMA, UNSPECIFIED ASTHMA SEVERITY, UNSPECIFIED WHETHER COMPLICATED, UNSPECIFIED WHETHER PERSISTENT: ICD-10-CM

## 2023-07-06 DIAGNOSIS — E78.2 MIXED HYPERLIPIDEMIA: Primary | ICD-10-CM

## 2023-07-06 DIAGNOSIS — R05.9 COUGH, UNSPECIFIED TYPE: ICD-10-CM

## 2023-07-06 DIAGNOSIS — E11.9 TYPE 2 DIABETES MELLITUS WITHOUT COMPLICATION, WITHOUT LONG-TERM CURRENT USE OF INSULIN (HCC): ICD-10-CM

## 2023-07-06 PROCEDURE — 99214 OFFICE O/P EST MOD 30 MIN: CPT | Performed by: FAMILY MEDICINE

## 2023-07-06 RX ORDER — SEMAGLUTIDE 1.34 MG/ML
1 INJECTION, SOLUTION SUBCUTANEOUS WEEKLY
Qty: 1 EACH | Refills: 0 | Status: SHIPPED | OUTPATIENT
Start: 2023-07-06

## 2023-07-07 ENCOUNTER — PATIENT MESSAGE (OUTPATIENT)
Dept: INTEGRATIVE MEDICINE | Facility: CLINIC | Age: 68
End: 2023-07-07

## 2023-07-07 DIAGNOSIS — E78.2 MIXED HYPERLIPIDEMIA: ICD-10-CM

## 2023-07-07 NOTE — TELEPHONE ENCOUNTER
A refill request was received for:  Requested Prescriptions     Pending Prescriptions Disp Refills    rosuvastatin 20 MG Oral Tab 90 tablet 0     Sig: Take 1 tablet (20 mg total) by mouth nightly.      Last refill date:  3/3/23  Qty: 90  Dx: Mixed hyperlipidemia  Last office visit:7/6/23  When is follow up due: 6 months      Future Appointments   Date Time Provider James Iverson   9/25/2023 11:00 AM Lorrie Stock, DO [facility] INT MED [facility]

## 2023-07-09 LAB — AMB EXT HGBA1C: 5.5 %

## 2023-07-10 ENCOUNTER — PATIENT MESSAGE (OUTPATIENT)
Dept: INTEGRATIVE MEDICINE | Facility: CLINIC | Age: 68
End: 2023-07-10

## 2023-07-10 RX ORDER — ROSUVASTATIN CALCIUM 20 MG/1
20 TABLET, COATED ORAL NIGHTLY
Qty: 90 TABLET | Refills: 0 | Status: SHIPPED | OUTPATIENT
Start: 2023-07-10

## 2023-07-10 NOTE — TELEPHONE ENCOUNTER
Patient requesting ozempic supply for 3 months- unable to make it exactly 90 days, order pended for 112 days. Please review.      Thanks

## 2023-07-11 RX ORDER — SEMAGLUTIDE 1.34 MG/ML
1 INJECTION, SOLUTION SUBCUTANEOUS WEEKLY
Qty: 4 EACH | Refills: 0
Start: 2023-07-11

## 2023-07-11 NOTE — TELEPHONE ENCOUNTER
Please advise patient:    1 month dose of Ozempic are sent when patient has not yet achieved recommended dose. For a 3 month refill he needs to complete the labs ordered at last visit to ensure no adverse response to the medication and his A1c and kidney are healthy. Please ensure all labs ordered form 3/2023 and 7/6/2023 are at [address].      He also needs a dilated eye exam. This follow up testing is required to determine if the typically medication adjustments are needed for ozempic.

## 2023-07-13 RX ORDER — GLIPIZIDE 5 MG/1
5 TABLET, FILM COATED, EXTENDED RELEASE ORAL DAILY
Qty: 90 TABLET | Refills: 0 | OUTPATIENT
Start: 2023-07-13

## 2023-08-18 ENCOUNTER — LAB ENCOUNTER (OUTPATIENT)
Dept: LAB | Facility: HOSPITAL | Age: 68
End: 2023-08-18
Attending: UROLOGY
Payer: MEDICARE

## 2023-08-18 ENCOUNTER — TELEPHONE (OUTPATIENT)
Dept: SURGERY | Facility: CLINIC | Age: 68
End: 2023-08-18

## 2023-08-18 DIAGNOSIS — N20.0 KIDNEY STONES: ICD-10-CM

## 2023-08-18 DIAGNOSIS — N20.0 KIDNEY STONE: Primary | ICD-10-CM

## 2023-08-18 LAB
CALCIUM 24H UR-SRATE: 405 MG/24HR (ref 42–353)
PH 24H UR: 6.5 [PH] (ref 5–8)
PHOSPHATE UR-SCNC: 610.2 MG/24HR (ref 400–1300)
SODIUM SERPL-SCNC: 116 MMOL/24HR (ref 40–220)
SPECIMEN VOL UR: 2700 ML
URATE UR-MCNC: 721 MG/24HR (ref 150–990)

## 2023-08-18 PROCEDURE — 84105 ASSAY OF URINE PHOSPHORUS: CPT

## 2023-08-18 PROCEDURE — 82507 ASSAY OF CITRATE: CPT

## 2023-08-18 PROCEDURE — 81003 URINALYSIS AUTO W/O SCOPE: CPT

## 2023-08-18 PROCEDURE — 82340 ASSAY OF CALCIUM IN URINE: CPT

## 2023-08-18 PROCEDURE — 83945 ASSAY OF OXALATE: CPT

## 2023-08-18 PROCEDURE — 84560 ASSAY OF URINE/URIC ACID: CPT

## 2023-08-18 PROCEDURE — 84300 ASSAY OF URINE SODIUM: CPT

## 2023-08-18 NOTE — TELEPHONE ENCOUNTER
I called pt verified name/ offered pt rachel for office visit with [facility], [place] 23 at 3:30pm. Appointment booked and call ended.

## 2023-08-18 NOTE — TELEPHONE ENCOUNTER
Patient calling to inform he  made ct scan appt for tomorrow 8/19/2023 at 1:00 pm in [address] [place] [address]. Please advise

## 2023-08-18 NOTE — TELEPHONE ENCOUNTER
I called the patient and confirmed his full name and     Pt states that the first week in August urine was coffee color, like blood in urine. No pain at that pain. Last Monday severe pain after dinner, relieved by Advil. Urine was tea colored the next day. Pain moderate. Pt now has a dull ache in his lower back, unable to say which side it is. Checked with Dr. Micaela Smith, who asked me to order the stone protocol and have the patient follow up next week. I called the patient again and gave him Dr. Montgomery Peabody message. I asked the patient to call us back and let us know what time the appointment for the CT is so that we can schedule a follow up with Dr. Micaela Smith. I also told the patient to got the ER if his pain increases and he verbalized his understanding. Patient will call us back when he has his appointment time so we can schedule an appointment with SAGAR.

## 2023-08-19 ENCOUNTER — HOSPITAL ENCOUNTER (OUTPATIENT)
Dept: CT IMAGING | Age: 68
Discharge: HOME | End: 2023-08-19
Attending: UROLOGY
Payer: MEDICARE

## 2023-08-19 DIAGNOSIS — N20.0 KIDNEY STONE: ICD-10-CM

## 2023-08-19 PROCEDURE — 74176 CT ABD & PELVIS W/O CONTRAST: CPT | Performed by: UROLOGY

## 2023-08-21 LAB
CITRATE/CREAT RATIO: 473.59 MG/G CREAT
CITRIC ACID 24H UR: 702 MG/24 HR
CITRIC ACID UR: 260 MG/L
CREAT 24H UR: 1482 MG/24 HR
CREAT UR: 54.9 MG/DL
CREAT UR: 55.6 MG/DL
OXALATE/CREAT. RATIO: 16.2 MG/G CREAT
OXALATES 24H UR: 24 MG/24 HR
OXALATES UR: 9 MG/L

## 2023-08-22 ENCOUNTER — OFFICE VISIT (OUTPATIENT)
Dept: SURGERY | Facility: CLINIC | Age: 68
End: 2023-08-22

## 2023-08-22 DIAGNOSIS — N20.0 KIDNEY STONE: Primary | ICD-10-CM

## 2023-08-22 PROCEDURE — 99213 OFFICE O/P EST LOW 20 MIN: CPT | Performed by: UROLOGY

## 2023-08-22 RX ORDER — HYDROCHLOROTHIAZIDE 12.5 MG/1
12.5 CAPSULE, GELATIN COATED ORAL DAILY
Qty: 90 CAPSULE | Refills: 3 | Status: SHIPPED | OUTPATIENT
Start: 2023-08-22

## 2023-08-22 NOTE — PROGRESS NOTES
Amador Morgan is a 80year old male. HPI:   Patient presents with:  Kidney Problem: Pt had gross hematuria and flank pain last vweek, pt stated he passed kidney stone yesterday. 71-year-old male in follow-up from previous visit December 22, 2022 with a history of kidney stones. 2 to 3 days ago he began having suprapubic discomfort. He was referred for a CT stone protocol done August 19, 2023 showing a 7 mm stone within the bladder at the level of the right ureterovesical junction with mild right hydroureteronephrosis. Yesterday he passed the stone that he brings this in with him in a specimen cup. No pain at present. No gross hematuria or dysuria.       HISTORY:  Past Medical History:   Diagnosis Date    Anemia     Anxiety state     \"mild\"    Asthma     Back problem     Blanchard's esophagus determined by endoscopy 07/2021    BPH (benign prostatic hyperplasia)     Coronary atherosclerosis     Depression     Diabetes ([address]     Diverticulosis of large intestine     Esophageal reflux     Hearing impairment     mild hearing loss    High blood pressure     High cholesterol     History of kidney stones 02/04/2022    Migraines     Osteoarthritis     Pneumonia due to organism     Recovering alcoholic ([address]     Sleep apnea     CPAP    Visual impairment     wears glasses      Past Surgical History:   Procedure Laterality Date    CATH DRUG ELUTING STENT  2012, 12/2020, 01/2021    x3    COLONOSCOPY  07/2021    CYSTOSCOPY,INSERT URETERAL STENT  06/2022    Stent removal    CYSTOSCOPY,URETEROSCOPY,LITHOTRIPSY Right 05/2022    EGD N/A 7/20/2021    Procedure: ESOPHAGOGASTRODUODENOSCOPY w/ bxs , COLONOSCOPY w/ polypectomy;  Surgeon: Kennedy Guzman MD;  Location: [address] [place] [address]      Family History   Problem Relation Age of Onset    Heart Disorder Father     Hypertension Father     Cancer Father         squamos cell    Other (Other) Father         prostate issues    Hypertension Mother     Arthritis Mother     Other (Blanchard's Esophagus) Mother     Arthritis Sister     Thyroid Disorder Sister     Arthritis Brother     Arthritis Sister     Depression Sister     Arthritis Brother     Depression Brother     Asthma Daughter       Social History:   Social History     Socioeconomic History    Marital status:    Tobacco Use    Smoking status: Never    Smokeless tobacco: Never   Vaping Use    Vaping Use: Never used   Substance and Sexual Activity    Alcohol use: Not Currently     Comment: quit - last drink July 4, 1994    Drug use: Not Currently     Types: Cannabis   Social History Narrative    Retired     - wife has MS    Daughter - Rexene Bones    Dad - aged 81 yo (6/2020)        Medications (Active prior to today's visit):  Current Outpatient Medications   Medication Sig Dispense Refill    hydroCHLOROthiazide 12.5 MG Oral Cap Take 1 capsule (12.5 mg total) by mouth daily. 90 capsule 3    rosuvastatin 20 MG Oral Tab Take 1 tablet (20 mg total) by mouth nightly. 90 tablet 0    OZEMPIC, 1 MG/DOSE, 4 MG/3ML Subcutaneous Solution Pen-injector Inject 1 mg into the skin once a week. 1 each 0    ipratropium 17 MCG/ACT Inhalation Aero Soln Inhale 2 puffs into the lungs 3 (three) times daily. 1 each 1    albuterol (PROAIR HFA) 108 (90 Base) MCG/ACT Inhalation Aero Soln Inhale 2 puffs into the lungs every 4 (four) hours as needed for Wheezing. 18 g 1    amLODIPine Besy-Benazepril HCl 10-40 MG Oral Cap Take 1 capsule by mouth daily. 90 capsule 0    metoprolol succinate ER 25 MG Oral Tablet 24 Hr Take 1 tablet (25 mg total) by mouth daily. 90 tablet 0    glipiZIDE ER 5 MG Oral Tablet 24 Hr Take 1 tablet (5 mg total) by mouth daily. 90 tablet 0    metFORMIN HCl 1000 MG Oral Tab Take 1 tablet (1,000 mg total) by mouth 2 (two) times daily with meals. 180 tablet 1    famotidine 20 MG Oral Tab Take 1 tablet (20 mg total) by mouth 2 (two) times daily.  180 tablet 1    buPROPion HCl ER, SR, 200 MG Oral Tablet 12 Hr Take 1 tablet (200 mg total) by mouth 2 (two) times daily. 180 tablet 1    fenofibrate 145 MG Oral Tab Take 1 tablet (145 mg total) by mouth daily. 90 tablet 1    Misc Natural Products (GLUCOSAMINE CHOND CMP ADVANCED) Oral Tab Take by mouth at bedtime. Multiple Vitamins-Minerals (CENTRUM SILVER 50+MEN) Oral Tab Take by mouth at bedtime. B Complex Vitamins (VITAMIN B COMPLEX) Oral Tab Take by mouth every morning. Ascorbic Acid (VITAMIN C) 1000 MG Oral Tab Take 1 tablet (1,000 mg total) by mouth daily. ferrous sulfate 325 (65 FE) MG Oral Tab EC Take 1 tablet (325 mg total) by mouth at bedtime. cholecalciferol 50 MCG (2000 UT) Oral Tab Take 1 tablet (2,000 Units total) by mouth at bedtime. Semaglutide, 1 MG/DOSE, (OZEMPIC, 1 MG/DOSE,) 2 MG/1.5ML Subcutaneous Solution Pen-injector Inject 1 mg into the skin once a week. 9 each 3    aspirin 81 MG Oral Tab Take 1 tablet (81 mg total) by mouth daily. Allergies:    Dander                  ASTHMA, Coughing  Mixed Grasses           ASTHMA, Coughing  Molds & Smuts           ASTHMA, Coughing  Tree, Elm               ASTHMA, Coughing  Seasonal                Coughing    Comment:Itchy eyes      ROS:       PHYSICAL EXAM:        ASSESSMENT/PLAN:   Assessment   Kidney stone  (primary encounter diagnosis)    Recommend:  - We will send stone for analysis. - I reviewed the result of his 24-hour urine collection showing hypercalciuria. Recommended starting him on hydrochlorothiazide 12.5 mg.  I advised the patient to check his blood pressure daily for the first 3 to 4 days of starting the medication. I also advised him to check his basic metabolic panel 1 week after starting the medication to confirm normal potassium levels. He will follow-up otherwise in 4 months with a repeat 24-hour urine collection 1 week prior to the appointment.          Orders This Visit:  Orders Placed This Encounter      Urine PH 24 HR      Calcium, 24Hr Urine      Oxalate, Quant, 24-Hour Urine      Urine Citrate Excretion 24HR      Sodium, Urine, 24-Hour      Uric Acid, Urine 24 hr      Urine Phosphorus 24 HR      Basic Metabolic Panel (8)      Specimen to Pathology, Tissue      Meds This Visit:  Requested Prescriptions     Signed Prescriptions Disp Refills    hydroCHLOROthiazide 12.5 MG Oral Cap 90 capsule 3     Sig: Take 1 capsule (12.5 mg total) by mouth daily.        Imaging & Referrals:  None     8/22/2023  Akira Ferreira MD

## 2023-08-23 PROCEDURE — 82365 CALCULUS SPECTROSCOPY: CPT | Performed by: UROLOGY

## 2023-08-29 LAB
CAOX DIHYDRATE: 90 %
CAOX MONOHYDRATE: 10 %
WEIGHT-STONE: 93 MG

## 2023-09-19 ENCOUNTER — HOSPITAL ENCOUNTER (OUTPATIENT)
Dept: GENERAL RADIOLOGY | Age: 68
Discharge: HOME | End: 2023-09-19
Attending: FAMILY MEDICINE
Payer: MEDICARE

## 2023-09-19 DIAGNOSIS — R05.9 COUGH, UNSPECIFIED TYPE: ICD-10-CM

## 2023-09-19 PROCEDURE — 71046 X-RAY EXAM CHEST 2 VIEWS: CPT | Performed by: FAMILY MEDICINE

## 2023-09-25 PROBLEM — F10.11 NONDEPENDENT ALCOHOL ABUSE, IN REMISSION: Status: ACTIVE | Noted: 2019-01-20

## 2023-09-25 PROBLEM — I10 ESSENTIAL HYPERTENSION: Status: ACTIVE | Noted: 2019-01-20

## 2023-09-25 PROBLEM — E11.9 TYPE 2 DIABETES MELLITUS WITHOUT COMPLICATION (HCC): Status: ACTIVE | Noted: 2019-01-20

## 2023-09-25 PROBLEM — K63.5 POLYP OF COLON: Status: ACTIVE | Noted: 2019-01-20

## 2023-09-25 PROBLEM — E78.5 HYPERLIPIDEMIA: Status: ACTIVE | Noted: 2019-01-20

## 2023-09-25 PROBLEM — I25.9 CHRONIC ISCHEMIC HEART DISEASE: Status: ACTIVE | Noted: 2019-01-20

## 2023-09-25 PROBLEM — E66.9 OBESITY: Status: ACTIVE | Noted: 2019-01-20

## 2023-09-25 PROBLEM — K21.9 GASTROESOPHAGEAL REFLUX DISEASE: Status: ACTIVE | Noted: 2019-01-20

## 2023-10-09 ENCOUNTER — MED REC SCAN ONLY (OUTPATIENT)
Dept: INTEGRATIVE MEDICINE | Facility: CLINIC | Age: 68
End: 2023-10-09

## 2023-10-09 NOTE — TELEPHONE ENCOUNTER
S/w Abel Polanco who is very dissatisfied with this office and [facility] due to requesting Ozempic 90 day supply but only getting 30 or 60 days. Pt requesting ASAP Rx sent to [facility] for a 90 day supply. Pt also stated he will no longer use [facility] labs since his labs are sent out and incurs additional charged by a \" pathologist\" for all labs. Pt had last labs done July 2023 from [facility]. Pt informed no information found in his chart from [facility] regarding lab results including [address]. Pt requesting office fax number to give to [facility].  [facility] sent with office fax number. OZEMPIC, 1 MG/DOSE, 4 MG/3ML Subcutaneous Solution Pen-injector # 9 pens to mail order pharmacy; authorize if appropriate.

## 2023-10-09 NOTE — TELEPHONE ENCOUNTER
Jose Nando  from [address] [place] [address] requested to have Ozempic 90 day supply mailed. Fax [phone].

## 2023-12-11 ENCOUNTER — OFFICE VISIT (OUTPATIENT)
Dept: SURGERY | Facility: CLINIC | Age: 68
End: 2023-12-11
Payer: MEDICARE

## 2023-12-11 DIAGNOSIS — N20.0 KIDNEY STONES: Primary | ICD-10-CM

## 2023-12-11 PROCEDURE — 99214 OFFICE O/P EST MOD 30 MIN: CPT | Performed by: UROLOGY

## 2023-12-11 NOTE — PROGRESS NOTES
Jorge Dial is a 80year old male. HPI:     Chief Complaint   Patient presents with    Follow - Up     P/t has no concerns     27-year-old male with a history of kidney stones, history of calcium oxalate dihydrate kidney stones in the past last seen in the office August 22, 2023. He passed a 7 mm stone shortly prior to his last visit in August.  He denies any flank pain. No problems or concerns. Previous 24-hour urine collection demonstrated hypercalciuria and he was started on hydrochlorothiazide. Denies any problems or concerns. No flank pain. IPSS score is 10 quality-of-life index is 3.         HISTORY:  Past Medical History:   Diagnosis Date    Anemia     Anxiety state     \"mild\"    Asthma     Back problem     Blanchard's esophagus determined by endoscopy 07/2021    BPH (benign prostatic hyperplasia)     Coronary atherosclerosis     Depression     Diabetes ([address]     Diverticulosis of large intestine     Esophageal reflux     Hearing impairment     mild hearing loss    High blood pressure     High cholesterol     History of kidney stones 02/04/2022    Migraines     Osteoarthritis     Pneumonia due to organism     Recovering alcoholic ([address]     Sleep apnea     CPAP    Visual impairment     wears glasses      Past Surgical History:   Procedure Laterality Date    CATH DRUG ELUTING STENT  2012, 12/2020, 01/2021    x3    COLONOSCOPY  07/2021    CYSTOSCOPY,INSERT URETERAL STENT  06/2022    Stent removal    CYSTOSCOPY,URETEROSCOPY,LITHOTRIPSY Right 05/2022    EGD N/A 7/20/2021    Procedure: ESOPHAGOGASTRODUODENOSCOPY w/ bxs , COLONOSCOPY w/ polypectomy;  Surgeon: Camron Bartlett MD;  Location: [address] [place] [address]      Family History   Problem Relation Age of Onset    Heart Disorder Father     Hypertension Father     Cancer Father         squamos cell    Other (Other) Father         prostate issues    Hypertension Mother     Arthritis Mother     Other (Blanchard's Esophagus) Mother     Arthritis Sister     Thyroid Disorder Sister     Arthritis Brother     Arthritis Sister     Depression Sister     Arthritis Brother     Depression Brother     Asthma Daughter       Social History:   Social History     Socioeconomic History    Marital status:    Tobacco Use    Smoking status: Never    Smokeless tobacco: Never   Vaping Use    Vaping Use: Never used   Substance and Sexual Activity    Alcohol use: Not Currently     Comment: quit - last drink July 4, 1994    Drug use: Not Currently     Types: Cannabis   Social History Narrative    Retired     - wife has MS    Daughter - Eleuterio bAdi - aged 81 yo (6/2020)        Medications (Active prior to today's visit):  Current Outpatient Medications   Medication Sig Dispense Refill    semaglutide 4 MG/3ML Subcutaneous Solution Pen-injector Inject 1 mg into the skin once a week. 9 each 0    rosuvastatin 20 MG Oral Tab Take 1 tablet (20 mg total) by mouth nightly. 90 tablet 1    metoprolol succinate ER 25 MG Oral Tablet 24 Hr Take 1 tablet (25 mg total) by mouth daily. 90 tablet 0    metFORMIN HCl 1000 MG Oral Tab Take 1 tablet (1,000 mg total) by mouth 2 (two) times daily with meals. 180 tablet 1    hydroCHLOROthiazide 12.5 MG Oral Cap Take 1 capsule (12.5 mg total) by mouth daily. 90 capsule 3    glipiZIDE ER 5 MG Oral Tablet 24 Hr Take 1 tablet (5 mg total) by mouth daily. 90 tablet 1    fenofibrate 145 MG Oral Tab Take 1 tablet (145 mg total) by mouth daily. 90 tablet 1    famotidine 20 MG Oral Tab Take 1 tablet (20 mg total) by mouth 2 (two) times daily. 180 tablet 1    buPROPion HCl ER, SR, 200 MG Oral Tablet 12 Hr Take 1 tablet (200 mg total) by mouth 2 (two) times daily. 180 tablet 1    amLODIPine Besy-Benazepril HCl 10-40 MG Oral Cap Take 1 capsule by mouth daily. 90 capsule 1    albuterol (PROAIR HFA) 108 (90 Base) MCG/ACT Inhalation Aero Soln Inhale 2 puffs into the lungs every 4 (four) hours as needed for Wheezing.  18 g 1    ipratropium 17 MCG/ACT Inhalation Aero Soln Inhale 2 puffs into the lungs 3 (three) times daily. 1 each 1    Misc Natural Products (GLUCOSAMINE CHOND CMP ADVANCED) Oral Tab Take by mouth at bedtime. Multiple Vitamins-Minerals (CENTRUM SILVER 50+MEN) Oral Tab Take by mouth at bedtime. B Complex Vitamins (VITAMIN B COMPLEX) Oral Tab Take by mouth every morning. Ascorbic Acid (VITAMIN C) 1000 MG Oral Tab Take 1 tablet (1,000 mg total) by mouth daily. ferrous sulfate 325 (65 FE) MG Oral Tab EC Take 1 tablet (325 mg total) by mouth at bedtime. cholecalciferol 50 MCG (2000 UT) Oral Tab Take 1 tablet (2,000 Units total) by mouth at bedtime. aspirin 81 MG Oral Tab Take 1 tablet (81 mg total) by mouth daily. Allergies: Allergies   Allergen Reactions    Dander ASTHMA and Coughing    Mixed Grasses ASTHMA and Coughing    Molds & Smuts ASTHMA and Coughing    Tree, Elm ASTHMA and Coughing    Other Coughing     Itchy eyes    Seasonal Coughing     Itchy eyes         ROS:       PHYSICAL EXAM:        ASSESSMENT/PLAN:   Assessment   Encounter Diagnosis   Name Primary? Kidney stones Yes       Recommend:  - Repeat 24-hour urine collection and follow-up in 6 months to assess hypercalciuria in the setting of being on hydrochlorothiazide 12.5 mg daily. - Call if he has any problems or concerns.          Orders This Visit:  Orders Placed This Encounter   Procedures    Urine PH 24 HR    Calcium, 24Hr Urine    Oxalate, Quant, 24-Hour Urine    Urine Citrate Excretion 24HR    Sodium, Urine, 24-Hour    Uric Acid, Urine 24 hr    Urine Phosphorus 24 HR       Meds This Visit:  Requested Prescriptions      No prescriptions requested or ordered in this encounter       Imaging & Referrals:  None     12/11/2023  Bert Kamara MD

## 2024-01-10 ENCOUNTER — TELEPHONE (OUTPATIENT)
Dept: INTEGRATIVE MEDICINE | Facility: CLINIC | Age: 69
End: 2024-01-10

## 2024-01-10 DIAGNOSIS — I10 ESSENTIAL HYPERTENSION: Primary | ICD-10-CM

## 2024-01-10 RX ORDER — METOPROLOL SUCCINATE 25 MG/1
25 TABLET, EXTENDED RELEASE ORAL DAILY
Qty: 90 TABLET | Refills: 0 | Status: SHIPPED | OUTPATIENT
Start: 2024-01-10

## 2024-01-10 NOTE — TELEPHONE ENCOUNTER
A refill request was received for:  Requested Prescriptions     Pending Prescriptions Disp Refills    metoprolol succinate ER 25 MG Oral Tablet 24 Hr 90 tablet 0     Sig: Take 1 tablet (25 mg total) by mouth daily.     Last refill date:  9/23/23  Qty: 90  Dx: HTN  Last office visit: 9/1/22 Dr. Hardin  When is follow up due: 6 months         Future Appointments   Date Time Provider Department Center   3/29/2024  8:30 AM Esteban Michelle MD [id number] [place]   6/11/2024  2:20 PM [facility]     New appt with Dr. Michelle on 3/29/24.     Metoprolol Succinate ER 25 mg, # 90 pended; authorize if appropriate.

## 2024-01-10 NOTE — TELEPHONE ENCOUNTER
Pt requested refill for Metoprolol Succinate 90 day refill. Pt scheduled with Miguel Angel in March.

## 2024-02-05 ENCOUNTER — MED REC SCAN ONLY (OUTPATIENT)
Dept: INTEGRATIVE MEDICINE | Facility: CLINIC | Age: 69
End: 2024-02-05

## 2024-04-12 ENCOUNTER — TELEPHONE (OUTPATIENT)
Dept: FAMILY MEDICINE CLINIC | Facility: CLINIC | Age: 69
End: 2024-04-12

## 2024-04-12 NOTE — TELEPHONE ENCOUNTER
I sent a [employer] message to see if diabetic eye exam was completed & to call our office to schedule annual physical.

## 2024-04-16 ENCOUNTER — TELEPHONE (OUTPATIENT)
Dept: FAMILY MEDICINE CLINIC | Facility: CLINIC | Age: 69
End: 2024-04-16

## 2024-04-18 DIAGNOSIS — I10 ESSENTIAL HYPERTENSION: ICD-10-CM

## 2024-04-19 RX ORDER — METOPROLOL SUCCINATE 25 MG/1
25 TABLET, EXTENDED RELEASE ORAL DAILY
Qty: 90 TABLET | Refills: 0 | Status: SHIPPED | OUTPATIENT
Start: 2024-04-19

## 2024-04-23 ENCOUNTER — OFFICE VISIT (OUTPATIENT)
Dept: FAMILY MEDICINE CLINIC | Facility: CLINIC | Age: 69
End: 2024-04-23
Payer: MEDICARE

## 2024-04-23 VITALS
TEMPERATURE: 98 F | BODY MASS INDEX: 33.14 KG/M2 | HEART RATE: 66 BPM | HEIGHT: 68 IN | WEIGHT: 218.63 LBS | SYSTOLIC BLOOD PRESSURE: 130 MMHG | DIASTOLIC BLOOD PRESSURE: 78 MMHG | OXYGEN SATURATION: 96 %

## 2024-04-23 DIAGNOSIS — I25.10 CORONARY ARTERY DISEASE INVOLVING NATIVE CORONARY ARTERY OF NATIVE HEART WITHOUT ANGINA PECTORIS: ICD-10-CM

## 2024-04-23 DIAGNOSIS — F41.9 ANXIETY: ICD-10-CM

## 2024-04-23 DIAGNOSIS — L98.9 SKIN LESION: ICD-10-CM

## 2024-04-23 DIAGNOSIS — Z12.5 SCREENING PSA (PROSTATE SPECIFIC ANTIGEN): ICD-10-CM

## 2024-04-23 DIAGNOSIS — F32.A DEPRESSION, UNSPECIFIED DEPRESSION TYPE: ICD-10-CM

## 2024-04-23 DIAGNOSIS — Z00.00 ENCOUNTER FOR ANNUAL HEALTH EXAMINATION: Primary | ICD-10-CM

## 2024-04-23 DIAGNOSIS — G47.33 OSA ON CPAP: ICD-10-CM

## 2024-04-23 DIAGNOSIS — K21.9 GASTROESOPHAGEAL REFLUX DISEASE WITHOUT ESOPHAGITIS: ICD-10-CM

## 2024-04-23 DIAGNOSIS — E78.5 HYPERLIPIDEMIA, UNSPECIFIED HYPERLIPIDEMIA TYPE: ICD-10-CM

## 2024-04-23 DIAGNOSIS — E11.9 TYPE 2 DIABETES MELLITUS WITHOUT COMPLICATION, WITHOUT LONG-TERM CURRENT USE OF INSULIN (HCC): ICD-10-CM

## 2024-04-23 DIAGNOSIS — E66.9 OBESITY, CLASS I, BMI 30-34.9: ICD-10-CM

## 2024-04-23 DIAGNOSIS — Z12.83 SCREENING EXAM FOR SKIN CANCER: ICD-10-CM

## 2024-04-23 DIAGNOSIS — Z71.85 IMMUNIZATION COUNSELING: ICD-10-CM

## 2024-04-23 DIAGNOSIS — I10 ESSENTIAL HYPERTENSION: ICD-10-CM

## 2024-04-23 DIAGNOSIS — F10.11 NONDEPENDENT ALCOHOL ABUSE, IN REMISSION: ICD-10-CM

## 2024-04-23 DIAGNOSIS — Z95.5 STATUS POST CORONARY ARTERY STENT PLACEMENT: ICD-10-CM

## 2024-04-23 PROCEDURE — G0439 PPPS, SUBSEQ VISIT: HCPCS | Performed by: STUDENT IN AN ORGANIZED HEALTH CARE EDUCATION/TRAINING PROGRAM

## 2024-04-23 PROCEDURE — 99214 OFFICE O/P EST MOD 30 MIN: CPT | Performed by: STUDENT IN AN ORGANIZED HEALTH CARE EDUCATION/TRAINING PROGRAM

## 2024-04-23 RX ORDER — FAMOTIDINE 20 MG/1
20 TABLET, FILM COATED ORAL 2 TIMES DAILY
Qty: 180 TABLET | Refills: 1 | Status: SHIPPED | OUTPATIENT
Start: 2024-04-23

## 2024-04-23 RX ORDER — BUPROPION HYDROCHLORIDE 200 MG/1
200 TABLET, EXTENDED RELEASE ORAL 2 TIMES DAILY
Qty: 180 TABLET | Refills: 1 | Status: SHIPPED | OUTPATIENT
Start: 2024-04-23

## 2024-04-23 RX ORDER — AMLODIPINE AND BENAZEPRIL HYDROCHLORIDE 10; 40 MG/1; MG/1
1 CAPSULE ORAL DAILY
Qty: 90 CAPSULE | Refills: 1 | Status: SHIPPED | OUTPATIENT
Start: 2024-04-23

## 2024-04-23 RX ORDER — ROSUVASTATIN CALCIUM 20 MG/1
20 TABLET, COATED ORAL NIGHTLY
Qty: 90 TABLET | Refills: 1 | Status: SHIPPED | OUTPATIENT
Start: 2024-04-23

## 2024-04-23 RX ORDER — FENOFIBRATE 145 MG/1
145 TABLET, COATED ORAL DAILY
Qty: 90 TABLET | Refills: 1 | Status: SHIPPED | OUTPATIENT
Start: 2024-04-23

## 2024-04-23 RX ORDER — HYDROCHLOROTHIAZIDE 12.5 MG/1
12.5 CAPSULE, GELATIN COATED ORAL DAILY
Qty: 90 CAPSULE | Refills: 1 | Status: SHIPPED | OUTPATIENT
Start: 2024-04-23

## 2024-04-23 RX ORDER — GLIPIZIDE 5 MG/1
5 TABLET, FILM COATED, EXTENDED RELEASE ORAL DAILY
Qty: 90 TABLET | Refills: 1 | Status: SHIPPED | OUTPATIENT
Start: 2024-04-23

## 2024-04-23 NOTE — PROGRESS NOTES
Subjective:   Steve Kitchen is a 69 year old male who presents for a Medicare Subsequent Annual Wellness visit (Pt already had Initial Annual Wellness) and scheduled follow up of multiple significant but stable problems    69-year-old male coming in for his routine Medicare physical.  History of DM that is well-controlled on medications.  Minimal neuropathy at medial big toes.  Has diabetic eye exam scheduled next week with Dr. Dey.  History of CAD status post stents x 3.  Currently on ASA.  Has not followed up with cardiology in approximately 2 years.  Used to see Dr. Melton.  History of FRANCY on CPAP.  History of depression since 17 years old.  Doing therapy over the past year and feels well on bupropion.  No SHIP.  History of alcohol use disorder in remission over the past 30 years.  Mentions noticing small white scaly skin lesions on left forearm as well as some scaliness on the face.  History of working as a  for many years.    History/Other:   Fall Risk Assessment:   He has been screened for Falls and is low risk.      Cognitive Assessment:   Abnormal  What day of the week is this?: Correct  What month is it?: Correct  What year is it?: Correct  Recall \"Ball\": Correct  Recall \"Flag\": Correct  Recall \"Tree\": Incorrect    Functional Ability/Status:   Steve Kitchen has some abnormal functions as listed below:  He has Hearing problems based on screening of functional status.He has Walking problems based on screening of functional status.       Depression Screening (PHQ-2/PHQ-9): PHQ-2 SCORE: 0  , done 4/23/2024            Advanced Directives:   He does NOT have a Living Will. [Do you have a living will?: No]  He does NOT have a Power of  for Health Care. [Do you have a healthcare power of ?: No]  Discussed Advance Care Planning with patient (and family/surrogate if present). Standard forms made available to patient in After Visit Summary.      Patient Active  Problem List   Diagnosis    Gastroesophageal reflux disease    Hyperlipidemia    Type 2 diabetes mellitus without complication (HCC)    Essential hypertension    Obesity    Disorder of upper respiratory system    Actinic keratosis    Anxiety    Chronic ischemic heart disease    Dysfunction of eustachian tube    Long term current use of aspirin    Nondependent alcohol abuse, in remission    Perforation of tympanic membrane    Polyp of colon    FRANCY on CPAP    Depression    Coronary artery disease involving native coronary artery    Cardiac syndrome X (HCC)    History of kidney stones    Sensorineural hearing loss (SNHL) of both ears     Allergies:  He is allergic to dander; mixed grasses; molds & smuts; tree, elm; other; and seasonal.    Current Medications:  Outpatient Medications Marked as Taking for the 4/23/24 encounter (Office Visit) with Esteban Michelle MD   Medication Sig    amLODIPine Besy-Benazepril HCl 10-40 MG Oral Cap Take 1 capsule by mouth daily.    buPROPion HCl ER, SR, 200 MG Oral Tablet 12 Hr Take 1 tablet (200 mg total) by mouth 2 (two) times daily.    famotidine 20 MG Oral Tab Take 1 tablet (20 mg total) by mouth 2 (two) times daily.    fenofibrate 145 MG Oral Tab Take 1 tablet (145 mg total) by mouth daily.    glipiZIDE ER 5 MG Oral Tablet 24 Hr Take 1 tablet (5 mg total) by mouth daily.    hydroCHLOROthiazide 12.5 MG Oral Cap Take 1 capsule (12.5 mg total) by mouth daily.    metFORMIN HCl 1000 MG Oral Tab Take 1 tablet (1,000 mg total) by mouth 2 (two) times daily with meals.    rosuvastatin 20 MG Oral Tab Take 1 tablet (20 mg total) by mouth nightly.    semaglutide 4 MG/3ML Subcutaneous Solution Pen-injector Inject 1 mg into the skin once a week.       Medical History:  He  has a past medical history of Anemia, Anxiety state, Asthma (HCC), Back problem, Blanchard's esophagus determined by endoscopy (07/2021), BPH (benign prostatic hyperplasia), Coronary atherosclerosis, Depression, Diabetes (HCC),  Diverticulosis of large intestine, Esophageal reflux, Hearing impairment, High blood pressure, High cholesterol, History of kidney stones (02/04/2022), Migraines, Osteoarthritis, Pneumonia due to organism, Recovering alcoholic (HCC), Sleep apnea, and Visual impairment.  Surgical History:  He  has a past surgical history that includes colonoscopy (07/2021); egd (N/A, 7/20/2021); cystoscopy,ureteroscopy,lithotripsy (Right, 05/2022); cystoscopy,insert ureteral stent (06/2022); and cath drug eluting stent (2012, 12/2020, 01/2021).   Family History:  His family history includes Arthritis in his brother, brother, mother, sister, and sister; Asthma in his daughter; Blanchard's Esophagus in his mother; Cancer in his father; Depression in his brother and sister; Heart Disorder in his father; Hypertension in his father and mother; Other in his father; Thyroid Disorder in his sister.  Social History:  He  reports that he has never smoked. He has never used smokeless tobacco. He reports that he does not currently use alcohol. He reports that he does not currently use drugs after having used the following drugs: Cannabis.    Tobacco:  He has never smoked tobacco.    CAGE Alcohol Screen:   CAGE screening score of 0 on 4/23/2024, showing low risk of alcohol abuse.      Patient Care Team:  Esteban Michelle MD as PCP - General  Dahiana Ojeda MD as Psychiatrist/APN (Psychiatry)  Michelle Farris LCPC as Clinical Therapist    Review of Systems   Constitutional:  Negative for chills, diaphoresis and fever.   HENT:  Negative for congestion, ear discharge, ear pain, sinus pressure, sinus pain and sore throat.    Eyes:  Negative for pain and discharge.   Respiratory:  Negative for cough, chest tightness, shortness of breath and wheezing.    Cardiovascular:  Negative for chest pain and palpitations.   Gastrointestinal:  Negative for abdominal pain, diarrhea, nausea and vomiting.   Endocrine: Negative for cold intolerance and heat intolerance.    Genitourinary:  Negative for dysuria, flank pain, frequency and urgency.   Musculoskeletal:  Negative for joint swelling.   Skin:  Negative for rash.        Small white scaly skin lesions on left forearm.   Neurological:  Negative for dizziness, syncope and headaches.   Psychiatric/Behavioral:  Negative for confusion and hallucinations.          Objective:   Physical Exam  Constitutional:       General: He is not in acute distress.     Appearance: Normal appearance. He is obese. He is not ill-appearing or toxic-appearing.   HENT:      Head: Normocephalic and atraumatic.      Right Ear: Tympanic membrane and ear canal normal.      Left Ear: Tympanic membrane and ear canal normal.      Mouth/Throat:      Mouth: Mucous membranes are moist.      Pharynx: Oropharynx is clear. No oropharyngeal exudate or posterior oropharyngeal erythema.   Eyes:      Extraocular Movements: Extraocular movements intact.      Pupils: Pupils are equal, round, and reactive to light.   Cardiovascular:      Rate and Rhythm: Normal rate and regular rhythm.      Heart sounds: Normal heart sounds. No murmur heard.     No gallop.   Pulmonary:      Effort: Pulmonary effort is normal. No respiratory distress.      Breath sounds: Normal breath sounds. No stridor. No wheezing, rhonchi or rales.   Abdominal:      General: Bowel sounds are normal.      Palpations: Abdomen is soft.      Tenderness: There is no abdominal tenderness. There is no right CVA tenderness, left CVA tenderness or guarding.   Musculoskeletal:         General: No swelling.      Cervical back: Normal range of motion and neck supple. No rigidity or tenderness.      Right lower leg: No edema.      Left lower leg: No edema.      Comments: Bilateral barefoot skin diabetic exam is abnormal with diabetic monofilament/sensation testing abnormal (mildly at medial big toes).   Skin:     General: Skin is warm and dry.      Comments: 2 small white scaly lesions on left forearm.  Area of dry  skin on bilateral face.   Neurological:      General: No focal deficit present.      Mental Status: He is alert and oriented to person, place, and time. Mental status is at baseline.      Cranial Nerves: No cranial nerve deficit.      Sensory: No sensory deficit.      Motor: Motor function is intact. No weakness.      Gait: Gait normal.   Psychiatric:         Mood and Affect: Mood normal.         Behavior: Behavior normal.         Thought Content: Thought content normal.         Judgment: Judgment normal.         /78   Pulse 66   Temp 97.6 °F (36.4 °C)   Ht 5' 8\" (1.727 m)   Wt 218 lb 9.6 oz (99.2 kg)   SpO2 96%   BMI 33.24 kg/m²  Estimated body mass index is 33.24 kg/m² as calculated from the following:    Height as of this encounter: 5' 8\" (1.727 m).    Weight as of this encounter: 218 lb 9.6 oz (99.2 kg).    Medicare Hearing Assessment:   Hearing Screening    Time taken: 4/23/2024  8:55 AM  Screening Method: Questionnaire  I have a problem hearing over the telephone: No I have trouble following the conversations when two or more people are talking at the same time: No   I have trouble understanding things on the TV: No I have to strain to understand conversations: No   I have to worry about missing the telephone ring or doorbell: No I have trouble hearing conversations in a noisy background such as a crowded room or restaurant: No   I get confused about where sounds come from: No I misunderstand some words in a sentence and need to ask people to repeat themselves: No   I especially have trouble understanding the speech of women and children: No I have trouble understanding the speaker in a large room such as at a meeting or place of [employer]: No   Many people I talk to seem to mumble (or don't speak clearly): No People get annoyed because I misunderstand what they say: No   I misunderstand what others are saying and make inappropriate responses: No I avoid social activities because I cannot hear well  and fear I will reply improperly: No   Family members and friends have told me they think I may have hearing loss: No             Visual Acuity:   Right Eye Visual Acuity: Corrected Right Eye Chart Acuity: 20/20   Left Eye Visual Acuity: Corrected Left Eye Chart Acuity: 20/100   Both Eyes Visual Acuity: Corrected Both Eyes Chart Acuity: 20/20   Able To Tolerate Visual Acuity: Yes        Assessment & Plan:   Steve Kitchen is a 69 year old male who presents for a Medicare Assessment.     1. Encounter for annual health examination (Primary)  -Healthy diet and lifestyle.  -Weight loss.  -Exercise as tolerated.  -Dental exam every 6 months or as recommended by dentist.  -Eye exams annually or as recommended by specialist.  -     Comp Metabolic Panel (14)  -     Hemoglobin A1C  -     Lipid Panel  -     TSH W Reflex To Free T4  -     Iron And Tibc  -     Ferritin  2. Coronary artery disease involving native coronary artery of native heart without angina pectoris  Stable, stent Dec 2020 and Jan 2021, no symptoms, CPM.  -Reestablish care and follow-up with cardiology  -     Cardio Referral - Internal  3. Status post coronary artery stent placement  As above.  -     Cardio Referral - Internal  4. Essential hypertension  CPM.  -     amLODIPine Besy-Benazepril HCl; Take 1 capsule by mouth daily.  Dispense: 90 capsule; Refill: 1  -     hydroCHLOROthiazide; Take 1 capsule (12.5 mg total) by mouth daily.  Dispense: 90 capsule; Refill: 1  5. Hyperlipidemia, unspecified hyperlipidemia type  Tolerating medication well.  CPM.  -     Lipid Panel  -     Cardio Referral - Internal  -     Fenofibrate; Take 1 tablet (145 mg total) by mouth daily.  Dispense: 90 tablet; Refill: 1  -     Rosuvastatin Calcium; Take 1 tablet (20 mg total) by mouth nightly.  Dispense: 90 tablet; Refill: 1  6. Type 2 diabetes mellitus without complication, without long-term current use of insulin (HCC)  Last A1c value was 5.5% done 7/9/2023.  -Pending  labs may consider discontinuing glipizide  -Patient will forward diabetic eye exam evaluation post completion next week  -     Comp Metabolic Panel (14)  -     Hemoglobin A1C  -     Microalb/Creat Ratio, Random Urine  -     glipiZIDE ER; Take 1 tablet (5 mg total) by mouth daily.  Dispense: 90 tablet; Refill: 1  -     metFORMIN HCl; Take 1 tablet (1,000 mg total) by mouth 2 (two) times daily with meals.  Dispense: 180 tablet; Refill: 1  -     Semaglutide (1 MG/DOSE); Inject 1 mg into the skin once a week.  Dispense: 3 each; Refill: 1  7. FRANCY on CPAP  Stable.  CPM.  8. Screening PSA (prostate specific antigen)  -     PSA Total, Screen; Future; Expected date: 04/23/2024  9. Anxiety  Stable.  No SHIP.  CPM.  -Continue with therapy  -     buPROPion HCl ER (SR); Take 1 tablet (200 mg total) by mouth 2 (two) times daily.  Dispense: 180 tablet; Refill: 1  10. Depression, unspecified depression type  Stable.  No SHIP.  CPM.  -Continue with therapy  -     buPROPion HCl ER (SR); Take 1 tablet (200 mg total) by mouth 2 (two) times daily.  Dispense: 180 tablet; Refill: 1  11. Skin lesion  History of being a  for many years.  -     DERM - INTERNAL  12. Screening exam for skin cancer  History of being a  for many years.  -     DERM - INTERNAL  13. Obesity, Class I, BMI 30-34.9  -Healthy diet and lifestyle  -Weight loss  14. Nondependent alcohol abuse, in remission  Stable.  Offers to be a [place] for alcoholics if need be.  15. Immunization counseling  -Discussed immunizations with patient.  16. Gastroesophageal reflux disease without esophagitis  Stable with no symptoms.  CPM.  -     Famotidine; Take 1 tablet (20 mg total) by mouth 2 (two) times daily.  Dispense: 180 tablet; Refill: 1      The patient indicates understanding of these issues and agrees to the plan.  Consult ordered.  Lab work ordered.  Reinforced healthy diet, lifestyle, and exercise.      Return in about 6 months (around 10/23/2024).     Esteban  MD Miguel Angel, 4/23/2024     Supplementary Documentation:   General Health:  In the past six months, have you lost more than 10 pounds without trying?: 2 - No  Has your appetite been poor?: No  Type of Diet: Balanced  How does the patient maintain a good energy level?: Daily Walks  How would you describe your daily physical activity?: Light  How would you describe your current health state?: Fair  How do you maintain positive mental well-being?: Social Interaction;Puzzles;Games;Visiting Friends;Visiting Family  On a scale of 0 to 10, with 0 being no pain and 10 being severe pain, what is your pain level?: 2 - (Mild)  In the past six months, have you experienced urine leakage?: 1-Yes  At any time do you feel concerned for the safety/well-being of yourself and/or your children, in your home or elsewhere?: Yes  Have you had any immunizations at another office such as Influenza, Hepatitis B, Tetanus, or Pneumococcal?: No        Steve Kitchen's SCREENING SCHEDULE   Tests on this list are recommended by your physician but may not be covered, or covered at this frequency, by your insurer.   Please check with your insurance carrier before scheduling to verify coverage.   PREVENTATIVE SERVICES FREQUENCY &  COVERAGE DETAILS LAST COMPLETION DATE   Diabetes Screening    Fasting Blood Sugar / Glucose    One screening every 12 months if never tested or if previously tested but not diagnosed with pre-diabetes   One screening every 6 months if diagnosed with pre-diabetes Lab Results   Component Value Date    GLU 86 02/20/2021        Cardiovascular Disease Screening    Lipid Panel  Cholesterol  Lipoprotein (HDL)  Triglycerides Covered every 5 years for all Medicare beneficiaries without apparent signs or symptoms of cardiovascular disease Lab Results   Component Value Date    CHOLEST 93 02/20/2021    HDL 34 (L) 02/20/2021    LDL 46 02/20/2021    TRIG 65 02/20/2021         Electrocardiogram (EKG)   Covered if needed at [place]  to Medicare, and non-screening if indicated for medical reasons -      Ultrasound Screening for Abdominal Aortic Aneurysm (AAA) Covered once in a lifetime for one of the following risk factors    Men who are 65-75 years old and have ever smoked    Anyone with a family history -     Colorectal Cancer Screening  Covered for ages 50-85; only need ONE of the following:    Colonoscopy   Covered every 10 years    Covered every 2 years if patient is at high risk or previous colonoscopy was abnormal 07/20/2021    Health Maintenance   Topic Date Due    Colorectal Cancer Screening  07/20/2028       Flexible Sigmoidoscopy   Covered every 4 years -    Fecal Occult Blood Test Covered annually -   Prostate Cancer Screening    Prostate-Specific Antigen (PSA) Annually Lab Results   Component Value Date    PSA 0.89 10/26/2020     Health Maintenance   Topic Date Due    PSA  10/26/2022      Immunizations    Influenza Covered once per flu season  Please get every year -  No recommendations at this time    Pneumococcal Each vaccine ([id number] & [id number]) covered once after 65 Prevnar 13: 03/01/2022    Stiusgzph77: 02/13/2013     Pneumococcal Vaccination(3 of 3 - PPSV23 or PCV20) due on 03/01/2023    Hepatitis B One screening covered for patients with certain risk factors   -  No recommendations at this time    Tetanus Toxoid Not covered by Medicare Part B unless medically necessary (cut with metal); may be covered with your pharmacy prescription benefits -    Tetanus, Diptheria and Pertusis TD and TDaP Not covered by Medicare Part B -  No recommendations at this time    Zoster Not covered by Medicare Part B; may be covered with your pharmacy  prescription benefits -  Zoster Vaccines(1 of 2) Never done     Diabetes      Hemoglobin A1C Annually; if last result is elevated, may be asked to retest more frequently.    Medicare covers every 3 months Lab Results   Component Value Date     02/20/2021    A1C 5.5 07/09/2023        Hemoglobin A1C Test due on 01/09/2024    Creat/alb ratio Annually No results found for: \"MICROALBCREA\", \"MALBCRECALC\"    LDL Annually Lab Results   Component Value Date    LDL 46 02/20/2021       Dilated Eye Exam Annually Last Diabetic Eye Exam:  No data recorded  No data recorded       Annual Monitoring of Persistent Medications (ACE/ARB, digoxin diuretics, anticonvulsants)    Potassium Annually Lab Results   Component Value Date    K 3.6 02/20/2021         Creatinine   Annually Lab Results   Component Value Date    CREATSERUM 0.97 02/20/2021         BUN Annually Lab Results   Component Value Date    BUN 10 02/20/2021       Drug Serum Conc Annually No results found for: \"DIGOXIN\", \"DIG\", \"VALP\"

## 2024-04-23 NOTE — PATIENT INSTRUCTIONS
Steve Kitchen's SCREENING SCHEDULE   Tests on this list are recommended by your physician but may not be covered, or covered at this frequency, by your insurer.   Please check with your insurance carrier before scheduling to verify coverage.   PREVENTATIVE SERVICES FREQUENCY &  COVERAGE DETAILS LAST COMPLETION DATE   Diabetes Screening    Fasting Blood Sugar / Glucose    One screening every 12 months if never tested or if previously tested but not diagnosed with pre-diabetes   One screening every 6 months if diagnosed with pre-diabetes Lab Results   Component Value Date    GLU 86 02/20/2021        Cardiovascular Disease Screening    Lipid Panel  Cholesterol  Lipoprotein (HDL)  Triglycerides Covered every 5 years for all Medicare beneficiaries without apparent signs or symptoms of cardiovascular disease Lab Results   Component Value Date    CHOLEST 93 02/20/2021    HDL 34 (L) 02/20/2021    LDL 46 02/20/2021    TRIG 65 02/20/2021         Electrocardiogram (EKG)   Covered if needed at [place] to Medicare, and non-screening if indicated for medical reasons -      Ultrasound Screening for Abdominal Aortic Aneurysm (AAA) Covered once in a lifetime for one of the following risk factors   • Men who are 65-75 years old and have ever smoked   • Anyone with a family history -     Colorectal Cancer Screening  Covered for ages 50-85; only need ONE of the following:    Colonoscopy   Covered every 10 years    Covered every 2 years if patient is at high risk or previous colonoscopy was abnormal 07/20/2021    Health Maintenance   Topic Date Due   • Colorectal Cancer Screening  07/20/2028       Flexible Sigmoidoscopy   Covered every 4 years -    Fecal Occult Blood Test Covered annually -   Prostate Cancer Screening    Prostate-Specific Antigen (PSA) Annually Lab Results   Component Value Date    PSA 0.89 10/26/2020     Health Maintenance   Topic Date Due   • PSA  10/26/2022      Immunizations    Influenza Covered once per flu  season  Please get every year -  No recommendations at this time    Pneumococcal Each vaccine ([id number] & [id number]) covered once after 65 Prevnar 13: 03/01/2022    [id number]: 02/13/2013     Pneumococcal Vaccination(3 of 3 - PPSV23 or PCV20) due on 03/01/2023    Hepatitis B One screening covered for patients with certain risk factors   -  No recommendations at this time    Tetanus Toxoid Not covered by Medicare Part B unless medically necessary (cut with metal); may be covered with your pharmacy prescription benefits -    Tetanus, Diptheria and Pertusis TD and TDaP Not covered by Medicare Part B -  No recommendations at this time    Zoster Not covered by Medicare Part B; may be covered with your pharmacy  prescription benefits -  Zoster Vaccines(1 of 2) Never done     Diabetes      Hemoglobin A1C Annually; if last result is elevated, may be asked to retest more frequently.    Medicare covers every 3 months Lab Results   Component Value Date     02/20/2021    A1C 5.5 07/09/2023       Hemoglobin A1C Test due on 01/09/2024    Creat/alb ratio Annually No results found for: \"MICROALBCREA\", \"MALBCRECALC\"    LDL Annually Lab Results   Component Value Date    LDL 46 02/20/2021       Dilated Eye Exam Annually [unfilled]     Annual Monitoring of Persistent Medications (ACE/ARB, digoxin diuretics, anticonvulsants)    Potassium Annually Lab Results   Component Value Date    K 3.6 02/20/2021         Creatinine   Annually Lab Results   Component Value Date    CREATSERUM 0.97 02/20/2021         BUN Annually Lab Results   Component Value Date    BUN 10 02/20/2021       Drug Serum Conc Annually No results found for: \"DIGOXIN\", \"DIG\", \"VALP\"

## 2024-04-30 ENCOUNTER — TELEPHONE (OUTPATIENT)
Dept: FAMILY MEDICINE CLINIC | Facility: CLINIC | Age: 69
End: 2024-04-30

## 2024-05-02 LAB
AMB EXT BUN: 12 MG/DL
AMB EXT CALCIUM: 11.2
AMB EXT CHOLESTEROL, TOTAL: 120 MG/DL
AMB EXT CREATININE: 1.04 MG/DL
AMB EXT HDL CHOLESTEROL: 36 MG/DL
AMB EXT HGBA1C: 6.6 %
AMB EXT LDL CHOLESTEROL, DIRECT: 66 MG/DL
AMB EXT MALB/CRE CALC: 7 UG/MG
AMB EXT TRIGLYCERIDES: 97 MG/DL
AMB EXT TSH: 2.79 MIU/ML

## 2024-05-03 LAB — A/G RATIO: 2.3

## 2024-05-23 ENCOUNTER — OFFICE VISIT (OUTPATIENT)
Dept: DERMATOLOGY CLINIC | Facility: CLINIC | Age: 69
End: 2024-05-23

## 2024-05-23 DIAGNOSIS — D22.9 MULTIPLE BENIGN NEVI: ICD-10-CM

## 2024-05-23 DIAGNOSIS — D49.2 NEOPLASM OF UNSPECIFIED BEHAVIOR OF BONE, SOFT TISSUE, AND SKIN: ICD-10-CM

## 2024-05-23 DIAGNOSIS — D18.01 CHERRY ANGIOMA: ICD-10-CM

## 2024-05-23 DIAGNOSIS — L57.0 MULTIPLE ACTINIC KERATOSES: ICD-10-CM

## 2024-05-23 DIAGNOSIS — L81.4 LENTIGINES: ICD-10-CM

## 2024-05-23 DIAGNOSIS — L82.1 SEBORRHEIC KERATOSES: Primary | ICD-10-CM

## 2024-05-23 DIAGNOSIS — B07.9 VERRUCA VULGARIS: ICD-10-CM

## 2024-05-23 PROCEDURE — 88305 TISSUE EXAM BY PATHOLOGIST: CPT | Performed by: STUDENT IN AN ORGANIZED HEALTH CARE EDUCATION/TRAINING PROGRAM

## 2024-05-23 PROCEDURE — 11102 TANGNTL BX SKIN SINGLE LES: CPT | Performed by: STUDENT IN AN ORGANIZED HEALTH CARE EDUCATION/TRAINING PROGRAM

## 2024-05-23 PROCEDURE — 17110 DESTRUCTION B9 LES UP TO 14: CPT | Performed by: STUDENT IN AN ORGANIZED HEALTH CARE EDUCATION/TRAINING PROGRAM

## 2024-05-23 PROCEDURE — 17000 DESTRUCT PREMALG LESION: CPT | Performed by: STUDENT IN AN ORGANIZED HEALTH CARE EDUCATION/TRAINING PROGRAM

## 2024-05-23 PROCEDURE — 17003 DESTRUCT PREMALG LES 2-14: CPT | Performed by: STUDENT IN AN ORGANIZED HEALTH CARE EDUCATION/TRAINING PROGRAM

## 2024-05-23 PROCEDURE — 99203 OFFICE O/P NEW LOW 30 MIN: CPT | Performed by: STUDENT IN AN ORGANIZED HEALTH CARE EDUCATION/TRAINING PROGRAM

## 2024-05-23 NOTE — PROGRESS NOTES
May 23, 2024    New patient     CHIEF COMPLAINT: FBSE    HISTORY OF PRESENT ILLNESS: .    1. Rough spots   Location: Bilateral ears   Duration: 2-3 months   Signs and symptoms: Painful, bleeding rough and dry spots    2. Dry scaly spots   Location: upper cheeks   Duration: 2 months   Signs and symptoms: scaly and dry sometime tender    3. Dry spots   Location: L forearm   Duration: 1-2 years   Signs and symptoms: tender, peels and dry spots    DERM HISTORY:  History of skin cancer: No    FAMILY HISTORY:  History of melanoma: No, Parents has hx of unknown skin CA    PAST MEDICAL HISTORY:  Past Medical History:    Anemia    Anxiety state    \"mild\"    Asthma (HCC)    Back problem    Blanchard's esophagus determined by endoscopy    BPH (benign prostatic hyperplasia)    Coronary atherosclerosis    Depression    Diabetes (HCC)    Diverticulosis of large intestine    Esophageal reflux    Hearing impairment    mild hearing loss    High blood pressure    High cholesterol    History of kidney stones    Migraines    Osteoarthritis    Pneumonia due to organism    Recovering alcoholic (HCC)    Sleep apnea    CPAP    Visual impairment    wears glasses       REVIEW OF SYSTEMS:  Constitutional: Denies fever, chills, unintentional weight loss.   Skin as per HPI    Medications  Current Outpatient Medications   Medication Sig Dispense Refill    amLODIPine Besy-Benazepril HCl 10-40 MG Oral Cap Take 1 capsule by mouth daily. 90 capsule 1    buPROPion HCl ER, SR, 200 MG Oral Tablet 12 Hr Take 1 tablet (200 mg total) by mouth 2 (two) times daily. 180 tablet 1    famotidine 20 MG Oral Tab Take 1 tablet (20 mg total) by mouth 2 (two) times daily. 180 tablet 1    fenofibrate 145 MG Oral Tab Take 1 tablet (145 mg total) by mouth daily. 90 tablet 1    glipiZIDE ER 5 MG Oral Tablet 24 Hr Take 1 tablet (5 mg total) by mouth daily. 90 tablet 1    hydroCHLOROthiazide 12.5 MG Oral Cap Take 1 capsule (12.5 mg total) by mouth daily. 90 capsule 1     metFORMIN HCl 1000 MG Oral Tab Take 1 tablet (1,000 mg total) by mouth 2 (two) times daily with meals. 180 tablet 1    rosuvastatin 20 MG Oral Tab Take 1 tablet (20 mg total) by mouth nightly. 90 tablet 1    semaglutide 4 MG/3ML Subcutaneous Solution Pen-injector Inject 1 mg into the skin once a week. 3 each 1    metoprolol succinate ER 25 MG Oral Tablet 24 Hr Take 1 tablet (25 mg total) by mouth daily. 90 tablet 0    albuterol (PROAIR HFA) 108 (90 Base) MCG/ACT Inhalation Aero Soln Inhale 2 puffs into the lungs every 4 (four) hours as needed for Wheezing. 18 g 1    Misc Natural Products (GLUCOSAMINE CHOND CMP ADVANCED) Oral Tab Take by mouth at bedtime.      Multiple Vitamins-Minerals (CENTRUM SILVER 50+MEN) Oral Tab Take by mouth at bedtime.      B Complex Vitamins (VITAMIN B COMPLEX) Oral Tab Take by mouth every morning.      Ascorbic Acid (VITAMIN C) 1000 MG Oral Tab Take 1 tablet (1,000 mg total) by mouth daily.      ferrous sulfate 325 (65 FE) MG Oral Tab EC Take 1 tablet (325 mg total) by mouth at bedtime.      cholecalciferol 50 MCG (2000 UT) Oral Tab Take 1 tablet (2,000 Units total) by mouth at bedtime.      aspirin 81 MG Oral Tab Take 1 tablet (81 mg total) by mouth daily.         PHYSICAL EXAM:  Patient declined chaperone   General: awake, alert, no acute distress  Skin: Skin exam was performed today including the following: head and face, scalp, neck, chest (including breasts and axillae), abdomen, back, bilateral upper extremities, bilateral lower extremities, hands, feet, digits, nails. Pertinent findings include:   - Scattered bright red-purple dome-shaped papules on the trunk and extremities   - Scattered light brown stellate macules on sun exposed sites  - Scattered, evenly colored, round brown macules and papules with regular borders on the trunk and extremities  - Numerous scattered skin-colored and brown, waxy, stuck-on papules and plaques on the trunk and extremities  - face and scalp with pink  gritty papules  - L ear with a pink-violaceous papule  - Finger with a verrucous papule    ASSESSMENT & PLAN:  Pathophysiology of diagnoses discussed with patient.  Therapeutic options reviewed. Risks, benefits, and alternatives discussed with patient. Instructions reviewed at length.    #Lentigines  #Seborrheic keratoses   #Cherry angiomas   - Reassurance provided regarding the benign nature of these lesions.    #Multiple benign nevi  - Complete skin exam performed today with no outlier lesions identified   - Reassured patient of benign nature of these lesions.   - Recommend daily photoprotection with broad-spectrum sunscreen, avoidance of sun during peak hours, and sun protective clothing.    - Dermoscopy was used for physical examination of pigmented lesions during today's office visit.    #Multiple actinic keratoses  - Discussed premalignant etiology and possibility of transformation to SCC  - Recommended cryotherapy today   - Discussed side effects including redness, swelling, crusting, and discolortion after treatment, wound care with soap/water and vaseline   - Recommend sun protection with spf 30 or higher, sun protective clothing such as wide brimmed hats and long sleeves. Recommend avoiding midday sun (10 am- 3 pm).     - Procedure Note Cryosurgery of pre-malignant lesion(s)  Risks, benefits, alternatives, complications, and personnel required for cryosurgery reviewed with patient. Patient verbalizes understanding and wishes to proceed.   - Cryosurgery performed with Liquid Nitrogen via cryostat spray gun to Actinic Keratosis . 3 lesion(s) treated.   - Patient tolerated well and wound care discussed. Return if lesions fail to fully resolve.    #Neoplasm(s) of uncertain behavior of skin  - Shave biopsy performed today   - Will notify patient with results and arrange for appropriate definitive treatment, if indicated.      Shave of lesion to establish and confirm diagnosis:  Photo taken: Yes    Risks,  benefits, alternatives and personnel required for shave biopsy reviewed with patient. Risks discussed include, but not limited to: pain, bleeding, infection, scar, reaction to anesthetic, and recurrence/need for further treatment.  Patient and physician agree as to site(s) to be biopsied. Patient verbalizes understanding and wishes to proceed.     Site(s) prepped with alcohol and anesthetized with 1% lidocaine with epinephrine.   Shave of lesion(s) performed to the level of the dermis. Specimen(s) from A. L helix of ear  [facility] and bandaging applied.   Written and verbal wound care instructions provided to patient, understanding verbalized.    #Verruca vulgaris    - Cryosurgery of non-malignant lesion(s)    Risks, benefits, alternatives and personnel required for cryosurgery reviewed with patient. Possible adverse effects reviewed including, but not limited to: redness, swelling, blister formation, postinflammatory pigment alteration, ring wart formation, scarring, recurrence. Pt verbalizes understanding and wishes to proceed.     Cryosurgery performed with Liquid Nitrogen via cryostat spray gun to warts. 1 lesion(s) treated.     Patient tolerated well.        Return to clinic: 6 months or sooner if something concerning arises     Elieser Park MD

## 2024-06-06 DIAGNOSIS — E83.52 SERUM CALCIUM ELEVATED: Primary | ICD-10-CM

## 2024-06-07 ENCOUNTER — TELEPHONE (OUTPATIENT)
Dept: FAMILY MEDICINE CLINIC | Facility: CLINIC | Age: 69
End: 2024-06-07

## 2024-06-07 PROCEDURE — 84560 ASSAY OF URINE/URIC ACID: CPT

## 2024-06-07 PROCEDURE — 82507 ASSAY OF CITRATE: CPT

## 2024-06-07 PROCEDURE — 81003 URINALYSIS AUTO W/O SCOPE: CPT

## 2024-06-07 PROCEDURE — 83945 ASSAY OF OXALATE: CPT

## 2024-06-07 PROCEDURE — 84105 ASSAY OF URINE PHOSPHORUS: CPT

## 2024-06-07 PROCEDURE — 82340 ASSAY OF CALCIUM IN URINE: CPT

## 2024-06-07 PROCEDURE — 84300 ASSAY OF URINE SODIUM: CPT

## 2024-06-08 ENCOUNTER — LAB ENCOUNTER (OUTPATIENT)
Dept: LAB | Facility: HOSPITAL | Age: 69
End: 2024-06-08
Attending: UROLOGY
Payer: MEDICARE

## 2024-06-08 DIAGNOSIS — N20.0 KIDNEY STONES: ICD-10-CM

## 2024-06-08 LAB
CALCIUM 24H UR-SRATE: 486 MG/24HR (ref 100–300)
PH 24H UR: 6 [PH] (ref 5–8)
PHOSPHATE UR-SCNC: 934.2 MG/24HR (ref 400–1300)
SODIUM SERPL-SCNC: 235 MMOL/L/24HR (ref 40–220)
SPECIMEN VOL UR: 2700 ML
URATE UR-MCNC: 915 MG/24HR (ref 250–750)

## 2024-06-11 ENCOUNTER — HOSPITAL ENCOUNTER (OUTPATIENT)
Dept: GENERAL RADIOLOGY | Facility: HOSPITAL | Age: 69
Discharge: HOME | End: 2024-06-11
Attending: UROLOGY
Payer: MEDICARE

## 2024-06-11 ENCOUNTER — OFFICE VISIT (OUTPATIENT)
Dept: SURGERY | Facility: CLINIC | Age: 69
End: 2024-06-11

## 2024-06-11 ENCOUNTER — LAB ENCOUNTER (OUTPATIENT)
Dept: LAB | Facility: HOSPITAL | Age: 69
End: 2024-06-11
Attending: STUDENT IN AN ORGANIZED HEALTH CARE EDUCATION/TRAINING PROGRAM
Payer: MEDICARE

## 2024-06-11 DIAGNOSIS — I10 ESSENTIAL HYPERTENSION: ICD-10-CM

## 2024-06-11 DIAGNOSIS — N20.0 KIDNEY STONE: Primary | ICD-10-CM

## 2024-06-11 DIAGNOSIS — N20.0 KIDNEY STONE: ICD-10-CM

## 2024-06-11 LAB
ANION GAP SERPL CALC-SCNC: 7 MMOL/L (ref 0–18)
BUN BLD-MCNC: 13 MG/DL (ref 9–23)
BUN/CREAT SERPL: 14 (ref 10–20)
CALCIUM BLD-MCNC: 10.1 MG/DL (ref 8.7–10.4)
CHLORIDE SERPL-SCNC: 105 MMOL/L (ref 98–112)
CO2 SERPL-SCNC: 29 MMOL/L (ref 21–32)
CREAT BLD-MCNC: 0.93 MG/DL
EGFRCR SERPLBLD CKD-EPI 2021: 89 ML/MIN/1.73M2 (ref 60–?)
FASTING STATUS PATIENT QL REPORTED: NO
GLUCOSE BLD-MCNC: 120 MG/DL (ref 70–99)
OSMOLALITY SERPL CALC.SUM OF ELEC: 293 MOSM/KG (ref 275–295)
POTASSIUM SERPL-SCNC: 3.7 MMOL/L (ref 3.5–5.1)
PTH-INTACT SERPL-MCNC: 28.5 PG/ML (ref 18.5–88)
SODIUM SERPL-SCNC: 141 MMOL/L (ref 136–145)

## 2024-06-11 PROCEDURE — 99214 OFFICE O/P EST MOD 30 MIN: CPT | Performed by: UROLOGY

## 2024-06-11 PROCEDURE — 83970 ASSAY OF PARATHORMONE: CPT | Performed by: STUDENT IN AN ORGANIZED HEALTH CARE EDUCATION/TRAINING PROGRAM

## 2024-06-11 PROCEDURE — 36415 COLL VENOUS BLD VENIPUNCTURE: CPT | Performed by: STUDENT IN AN ORGANIZED HEALTH CARE EDUCATION/TRAINING PROGRAM

## 2024-06-11 PROCEDURE — 80048 BASIC METABOLIC PNL TOTAL CA: CPT | Performed by: STUDENT IN AN ORGANIZED HEALTH CARE EDUCATION/TRAINING PROGRAM

## 2024-06-11 PROCEDURE — 74021 RADEX ABDOMEN 3+ VIEWS: CPT | Performed by: UROLOGY

## 2024-06-11 RX ORDER — HYDROCHLOROTHIAZIDE 25 MG/1
25 TABLET ORAL DAILY
Qty: 90 TABLET | Refills: 3 | Status: SHIPPED | OUTPATIENT
Start: 2024-06-11

## 2024-06-11 NOTE — PROGRESS NOTES
Steve Kitchen is a 69 year old male.    HPI:     Chief Complaint   Patient presents with    Follow - Up     Review 24 hr urine collection results, discuss high calcium       69-year-old male with a history of kidney stones, hypercalciuria for which he remains on hydrochlorothiazide 12.5 mg daily.  No renal colic symptoms reported since his last visit.  Repeat 24-hour urine collection June 7, 2024 shows persistently elevated urine calcium excretion and sodium excretion and uric acid excretion.  Calcium and oxalate results are still pending.  Component      Latest Ref Rng 8/18/2023 6/7/2024   Creat 24h Ur      1000 - 2000 mg/24 hr 1482     Creatinine, Urine      Not Estab. mg/dL 54.9     Citric Acid Ur      Undefined mg/L 260     Citrate/Creat Ratio      Not Estab. mg/g creat 473.59     Citric Acid 24h Ur      320 - 1240 mg/24 hr 702     CALCIUM URINE CALC      100 - 300 mg/24hr 405 (H)  486 (H)    Urine Volume 24Hr      mL 2,700  2,700    Urine Volume 24Hr      mL 2,700  2,700    Urine Volume 24Hr      mL 2,700  2,700    NA UR CALC      40 - 220 mmol/L/24hr 116  235 (H)    URIC UR CALC      250 - 750 mg/24hr 721  915 (H)       Legend:  (H) High  Most recent imaging CT abdomen pelvis stone protocol August 19, 2023 demonstrated a 7 mm stone in the bladder at the right UV junction which he passed subsequently.  HISTORY:  Past Medical History:    AK (actinic keratosis)    Skin, left helix of ear    Allergic rhinitis    Anemia    Anxiety    Anxiety state    \"mild\"    Asthma (HCC)    Back problem    Blanchard's esophagus determined by endoscopy    BPH (benign prostatic hyperplasia)    Coronary atherosclerosis    Depression    Diabetes (HCC)    Diverticulosis of large intestine    Esophageal reflux    Essential hypertension    Hearing impairment    mild hearing loss    High blood pressure    High cholesterol    History of kidney stones    Hyperlipidemia    Migraines    Obesity    Osteoarthritis    Pneumonia due to  organism    Recovering alcoholic (HCC)    Recurrent acute otitis media    Recurrent pneumonia    Twice in 1972 only, 1 bacterial, 1 viral    Scoliosis    Sleep apnea    CPAP    Visual impairment    wears glasses      Past Surgical History:   Procedure Laterality Date    Angioplasty (coronary)  2012, 12/2020, 01/2021    Cath drug eluting stent  2012, 12/2020, 01/2021    x3    Colonoscopy  07/2021    Colonoscopy  07/2021    Cystoscopy,insert ureteral stent  06/2022    Stent removal    Cystoscopy,ureteroscopy,lithotripsy Right 05/2022    Egd N/A 07/20/2021    Procedure: ESOPHAGOGASTRODUODENOSCOPY w/ bxs , COLONOSCOPY w/ polypectomy;  Surgeon: Lucio Souza MD;  Location: [facility], [facility]      Family History   Problem Relation Age of Onset    Heart Disorder Father         Cardiac stent    Hypertension Father     Cancer Father         Severl skin lesions removed    Other (Other) Father         prostate issues    Hypertension Mother     Arthritis Mother     Other (Blanchard's Esophagus) Mother     Cancer Mother         Several skin lesions removed    Arthritis Sister     Thyroid Disorder Sister     Arthritis Brother     Arthritis Sister     Depression Sister     Arthritis Brother     Depression Brother     Asthma Daughter     Cancer Maternal Grandfather         Several skin lesions removed    Dementia Maternal Grandfather     Diabetes Maternal Grandfather         Type II, non-insulin dependant    Cancer Maternal Grandmother         Colon cancer, colostomy, eventual cause of death      Social History:   Social History     Socioeconomic History    Marital status:    Tobacco Use    Smoking status: Never    Smokeless tobacco: Never   Vaping Use    Vaping status: Never Used   Substance and Sexual Activity    Alcohol use: Not Currently     Comment: None since 07/04/1994    Drug use: Not Currently     Types: Cannabis     Comment: None since 1994   Other Topics Concern    Grew up on a farm No    History of  tanning Yes     Comment: Lifeguard, [phone]    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 90 tablet 3    amLODIPine Besy-Benazepril HCl 10-40 MG Oral Cap Take 1 capsule by mouth daily. 90 capsule 1    buPROPion HCl ER, SR, 200 MG Oral Tablet 12 Hr Take 1 tablet (200 mg total) by mouth 2 (two) times daily. 180 tablet 1    famotidine 20 MG Oral Tab Take 1 tablet (20 mg total) by mouth 2 (two) times daily. 180 tablet 1    fenofibrate 145 MG Oral Tab Take 1 tablet (145 mg total) by mouth daily. 90 tablet 1    glipiZIDE ER 5 MG Oral Tablet 24 Hr Take 1 tablet (5 mg total) by mouth daily. 90 tablet 1    metFORMIN HCl 1000 MG Oral Tab Take 1 tablet (1,000 mg total) by mouth 2 (two) times daily with meals. 180 tablet 1    rosuvastatin 20 MG Oral Tab Take 1 tablet (20 mg total) by mouth nightly. 90 tablet 1    semaglutide 4 MG/3ML Subcutaneous Solution Pen-injector Inject 1 mg into the skin once a week. 3 each 1    metoprolol succinate ER 25 MG Oral Tablet 24 Hr Take 1 tablet (25 mg total) by mouth daily. 90 tablet 0    albuterol (PROAIR HFA) 108 (90 Base) MCG/ACT Inhalation Aero Soln Inhale 2 puffs into the lungs every 4 (four) hours as needed for Wheezing. 18 g 1    Misc Natural Products (GLUCOSAMINE CHOND CMP ADVANCED) Oral Tab Take by mouth at bedtime.      Multiple Vitamins-Minerals (CENTRUM SILVER 50+MEN) Oral Tab Take by mouth at bedtime.      B Complex Vitamins (VITAMIN B COMPLEX) Oral Tab Take by mouth every morning.       Ascorbic Acid (VITAMIN C) 1000 MG Oral Tab Take 1 tablet (1,000 mg total) by mouth daily.      ferrous sulfate 325 (65 FE) MG Oral Tab EC Take 1 tablet (325 mg total) by mouth at bedtime.      cholecalciferol 50 MCG (2000 UT) Oral Tab Take 1 tablet (2,000 Units total) by mouth at bedtime.      aspirin 81 MG Oral Tab Take 1 tablet (81 mg total) by mouth daily.         Allergies:  Allergies   Allergen Reactions    Dander ASTHMA and Coughing    Mixed Grasses ASTHMA and Coughing    Molds & Smuts ASTHMA and Coughing    Tree, Elm ASTHMA and Coughing    Other Coughing     Itchy eyes    Seasonal Coughing     Itchy eyes         ROS:       PHYSICAL EXAM:        ASSESSMENT/PLAN:   Assessment   Encounter Diagnoses   Name Primary?    Kidney stone Yes    Essential hypertension        Recommend:  - Increase the dose of hydrochlorothiazide from 12.5 mg daily to 25 mg daily.  - Await the results of his citrate and oxalate.  If his citrate is low, would recommend starting him on potassium citrate 10 mEq 2-3 times daily.  - Renal ultrasound and KUB after the visit today.  - Follow-up tentatively otherwise in 6 months.  - I also encouraged the patient to proceed by getting his serum PTH ordered by his primary care physician as he has had borderline elevated serum calcium levels in the past.         Orders This Visit:  No orders of the defined types were placed in this encounter.      Meds This Visit:  Requested Prescriptions     Signed Prescriptions Disp Refills    hydroCHLOROthiazide 25 MG Oral Tab 90 tablet 3     Sig: Take 1 tablet (25 mg total) by mouth daily.       Imaging & Referrals:  [facility] (CPT=[id number])     6/11/2024  Philip Betancourt MD

## 2024-06-12 LAB
CITRATE/CREAT RATIO: 1012.54 MG/G CREAT
CITRIC ACID 24H UR: 1526 MG/24 HR
CITRIC ACID UR: 565 MG/L
CREAT 24H UR: 1507 MG/24 HR
CREAT UR: 55.8 MG/DL
CREAT UR: 56.8 MG/DL
OXALATE/CREAT. RATIO: 26.4 MG/G CREAT
OXALATES 24H UR: 41 MG/24 HR
OXALATES UR: 15 MG/L

## 2024-06-18 ENCOUNTER — HOSPITAL ENCOUNTER (OUTPATIENT)
Dept: ULTRASOUND IMAGING | Age: 69
Discharge: HOME | End: 2024-06-18
Attending: UROLOGY

## 2024-06-18 ENCOUNTER — PATIENT MESSAGE (OUTPATIENT)
Dept: SURGERY | Facility: CLINIC | Age: 69
End: 2024-06-18

## 2024-06-18 DIAGNOSIS — N20.0 KIDNEY STONE: ICD-10-CM

## 2024-06-18 PROCEDURE — 76775 US EXAM ABDO BACK WALL LIM: CPT | Performed by: UROLOGY

## 2024-06-19 ENCOUNTER — TELEPHONE (OUTPATIENT)
Dept: SURGERY | Facility: CLINIC | Age: 69
End: 2024-06-19

## 2024-06-19 NOTE — TELEPHONE ENCOUNTER
From: Steve Kitchen  To: Philip Betancourt  Sent: 6/18/2024 2:56 PM CDT  Subject: UltrasounI am willing to wait on Dr. Betancourt's reviewd result    I am willing to wait on Dr. Betancourt's review when he returns. I have several medical visits that I have to drive my disabled wife to. I currently have no unusual symptoms related to my kidneys and since the cyst was present in prior imaging (although the measurements are slightly larger) and the foci are less than one cm., it doesn't seem urgent today. Also, I have an angiogram scheduled for July 2nd which may result in further procedure and cardiac rehabilitation, I want to make sure I have no conflicting appointments.

## 2024-06-19 NOTE — TELEPHONE ENCOUNTER
I sent a t/c msg to SAGAR asking him to review US results and advise if he agrees with NATASHA.          Maude Bass PA-C  6/18/2024  2:30 PM CDT       MCM sent. Consider CT scan. Will also await review by NIESHA on his return.

## 2024-06-19 NOTE — TELEPHONE ENCOUNTER
I sent result to NIESHA (with delayed for next week). When he returns he can provide recommendations.

## 2024-06-19 NOTE — TELEPHONE ENCOUNTER
Hi Doc, pt wanted your advice on what the next step is.  See US results and Maude's response below.     Renal US of 6/18  CONCLUSION:      Bilateral subcentimeter echogenic foci which may reflect bilateral nonobstructing nephrolithiasis.      Small left renal cysts.          Maude Bass PA-C  6/18/2024  2:30 PM CDT       MCM sent. Consider CT scan. Will also await review by KB on his return.

## 2024-06-20 NOTE — TELEPHONE ENCOUNTER
Freedom Wilson,     Ultrasound shows possible stones in both kidneys. Ultrasound at times can over estimate stone burden in the kidney. Since your xray didn't show any stones clearly we could hold and reimage in the fall or we can consider CT scan to confirm whether you do have stones. Please let us know your thoughts. Dr. Betancourt is currently out of the office returning next week if you would like to await his review, otherwise if you feel strongly that you would like to proceed with CT scan I can order also.     Take care,  Maude Segura PA-C   Written by Maude Segura PA-C on 6/18/2024  2:30 PM CDT  Seen by patient Steve Carpenter Fidelina on 6/18/2024  2:44 PM

## 2024-06-26 NOTE — TELEPHONE ENCOUNTER
Pt read the my chart msg and has not called back to ask for the CT order and has a 12/9/24 appt. For f/u with SAGAR.

## 2024-06-28 NOTE — TELEPHONE ENCOUNTER
Received a call from patient, I reviewed with him MD. Betancourt's advise. Patient reports no new symptoms, I made him aware to give us a call if he noticed anything new. Patient verbalized understandings and has no further questions.

## 2024-07-01 DIAGNOSIS — I10 ESSENTIAL HYPERTENSION: ICD-10-CM

## 2024-07-01 RX ORDER — METOPROLOL SUCCINATE 25 MG/1
25 TABLET, EXTENDED RELEASE ORAL DAILY
Qty: 90 TABLET | Refills: 1 | Status: SHIPPED | OUTPATIENT
Start: 2024-07-01

## 2024-07-01 NOTE — TELEPHONE ENCOUNTER
A refill request was received for:  Requested Prescriptions     Pending Prescriptions Disp Refills    metoprolol succinate ER 25 MG Oral Tablet 24 Hr 90 tablet 0     Sig: Take 1 tablet (25 mg total) by mouth daily.     Last refill date:  4/19/2024  Qty: 90 tablets and 0  Dx: HTN  Last office visit: 4/23/2024  When is follow up due: 10/23/2024      Future Appointments   Date Time Provider Department Center   12/9/2024  2:30 PM Philip Betancourt MD [facility]

## 2024-08-22 ENCOUNTER — PATIENT MESSAGE (OUTPATIENT)
Dept: FAMILY MEDICINE CLINIC | Facility: CLINIC | Age: 69
End: 2024-08-22

## 2024-08-27 NOTE — TELEPHONE ENCOUNTER
RN s/w [facility] in [place] who stated pt's history and physical do not need to be filled out if pt's history and physical from MD is attached, just need to write \"see attached\" on top of application. Rep also stated that the last 3 questions do need to be answered and signed by MD.     Form completed and gave to  to contact pt for .

## 2024-10-15 DIAGNOSIS — E78.5 HYPERLIPIDEMIA, UNSPECIFIED HYPERLIPIDEMIA TYPE: ICD-10-CM

## 2024-10-15 DIAGNOSIS — I10 ESSENTIAL HYPERTENSION: ICD-10-CM

## 2024-10-15 DIAGNOSIS — E11.9 TYPE 2 DIABETES MELLITUS WITHOUT COMPLICATION, WITHOUT LONG-TERM CURRENT USE OF INSULIN (HCC): ICD-10-CM

## 2024-10-15 DIAGNOSIS — F32.A DEPRESSION, UNSPECIFIED DEPRESSION TYPE: ICD-10-CM

## 2024-10-15 DIAGNOSIS — K21.9 GASTROESOPHAGEAL REFLUX DISEASE WITHOUT ESOPHAGITIS: ICD-10-CM

## 2024-10-15 DIAGNOSIS — F41.9 ANXIETY: ICD-10-CM

## 2024-10-15 RX ORDER — BUPROPION HYDROCHLORIDE 200 MG/1
200 TABLET, EXTENDED RELEASE ORAL 2 TIMES DAILY
Qty: 180 TABLET | Refills: 1 | Status: SHIPPED | OUTPATIENT
Start: 2024-10-15

## 2024-10-15 RX ORDER — ROSUVASTATIN CALCIUM 20 MG/1
20 TABLET, COATED ORAL NIGHTLY
Qty: 90 TABLET | Refills: 1 | Status: SHIPPED | OUTPATIENT
Start: 2024-10-15

## 2024-10-15 RX ORDER — GLIPIZIDE 5 MG/1
5 TABLET, FILM COATED, EXTENDED RELEASE ORAL DAILY
Qty: 90 TABLET | Refills: 1 | Status: SHIPPED | OUTPATIENT
Start: 2024-10-15

## 2024-10-15 RX ORDER — AMLODIPINE AND BENAZEPRIL HYDROCHLORIDE 10; 40 MG/1; MG/1
1 CAPSULE ORAL DAILY
Qty: 90 CAPSULE | Refills: 1 | Status: SHIPPED | OUTPATIENT
Start: 2024-10-15

## 2024-10-15 RX ORDER — FAMOTIDINE 20 MG/1
20 TABLET, FILM COATED ORAL 2 TIMES DAILY
Qty: 180 TABLET | Refills: 1 | Status: SHIPPED | OUTPATIENT
Start: 2024-10-15

## 2024-10-15 RX ORDER — FENOFIBRATE 145 MG/1
145 TABLET, COATED ORAL DAILY
Qty: 90 TABLET | Refills: 1 | Status: SHIPPED | OUTPATIENT
Start: 2024-10-15

## 2024-10-15 NOTE — TELEPHONE ENCOUNTER
A refill request was received for:  Requested Prescriptions     Pending Prescriptions Disp Refills    FENOFIBRATE 145 MG Oral Tab [Pharmacy Med Name: FENOFIBRATE 145 MG TABLET] 90 tablet 1     Sig: TAKE 1 TABLET BY MOUTH EVERY DAY    ROSUVASTATIN 20 MG Oral Tab [Pharmacy Med Name: ROSUVASTATIN CALCIUM 20 MG TAB] 90 tablet 1     Sig: TAKE 1 TABLET BY MOUTH EVERY DAY AT NIGHT     Signed Prescriptions Disp Refills    GLIPIZIDE ER 5 MG Oral Tablet 24 Hr 90 tablet 1     Sig: TAKE 1 TABLET (5 MG TOTAL) BY MOUTH DAILY.     Authorizing Provider: DANE JENKINS     Ordering User: JEFRY BELLAMY    BUPROPION HCL ER, SR, 200 MG Oral Tablet 12 Hr 180 tablet 1     Sig: TAKE 1 TABLET BY MOUTH TWICE A DAY     Authorizing Provider: DANE JENKINS     Ordering User: JEFRY BELLAMY    AMLODIPINE BESY-BENAZEPRIL HCL 10-40 MG Oral Cap 90 capsule 1     Sig: TAKE 1 CAPSULE BY MOUTH EVERY DAY     Authorizing Provider: DANE JENKINS     Ordering User: JEFRY BELLAMY    FAMOTIDINE 20 MG Oral Tab 180 tablet 1     Sig: TAKE 1 TABLET BY MOUTH TWICE A DAY     Authorizing Provider: DANE JENKINS     Ordering User: JEFRY BELLAMY    METFORMIN HCL 1000 MG Oral Tab 180 tablet 1     Sig: TAKE 1 TABLET BY MOUTH TWICE A DAY WITH MEALS     Authorizing Provider: DANE JENKINS     Ordering User: JEFRY BELLAMY     Last refill date:  Fenofibrate - 4/23/2024  Rosuvastatin - 4/23/2024  Qty: Both 90 tablets and 1  Dx: hyperlipidemia  Last office visit: 4/23/2024  When is follow up due: 10/23/2024    Future Appointments   Date Time Provider Department Center   12/9/2024  2:30 PM [facility]

## 2024-10-28 DIAGNOSIS — E11.9 TYPE 2 DIABETES MELLITUS WITHOUT COMPLICATION, WITHOUT LONG-TERM CURRENT USE OF INSULIN (HCC): ICD-10-CM

## 2024-10-28 RX ORDER — SEMAGLUTIDE 1.34 MG/ML
1 INJECTION, SOLUTION SUBCUTANEOUS WEEKLY
Qty: 3 EACH | Refills: 0 | Status: SHIPPED | OUTPATIENT
Start: 2024-10-28

## 2024-10-29 ENCOUNTER — TELEPHONE (OUTPATIENT)
Dept: FAMILY MEDICINE CLINIC | Facility: CLINIC | Age: 69
End: 2024-10-29

## 2024-10-30 ENCOUNTER — TELEPHONE (OUTPATIENT)
Dept: FAMILY MEDICINE CLINIC | Facility: CLINIC | Age: 69
End: 2024-10-30

## 2024-10-30 NOTE — TELEPHONE ENCOUNTER
The patient called and stated he received a [employer] message reminder stating he is due for PSA screening. The patient called to ask what does he need to do?

## 2024-10-31 ENCOUNTER — LAB ENCOUNTER (OUTPATIENT)
Dept: LAB | Facility: HOSPITAL | Age: 69
End: 2024-10-31
Attending: STUDENT IN AN ORGANIZED HEALTH CARE EDUCATION/TRAINING PROGRAM
Payer: MEDICARE

## 2024-10-31 DIAGNOSIS — Z12.5 SCREENING PSA (PROSTATE SPECIFIC ANTIGEN): ICD-10-CM

## 2024-10-31 LAB — COMPLEXED PSA SERPL-MCNC: 0.75 NG/ML (ref ?–4)

## 2024-10-31 PROCEDURE — 36415 COLL VENOUS BLD VENIPUNCTURE: CPT

## 2024-11-05 ENCOUNTER — OFFICE VISIT (OUTPATIENT)
Dept: FAMILY MEDICINE CLINIC | Facility: CLINIC | Age: 69
End: 2024-11-05
Payer: MEDICARE

## 2024-11-05 VITALS
BODY MASS INDEX: 33.04 KG/M2 | HEART RATE: 57 BPM | SYSTOLIC BLOOD PRESSURE: 130 MMHG | DIASTOLIC BLOOD PRESSURE: 78 MMHG | HEIGHT: 68 IN | TEMPERATURE: 97 F | OXYGEN SATURATION: 100 % | WEIGHT: 218 LBS

## 2024-11-05 DIAGNOSIS — I10 ESSENTIAL HYPERTENSION: ICD-10-CM

## 2024-11-05 DIAGNOSIS — Z95.5 STATUS POST CORONARY ARTERY STENT PLACEMENT: ICD-10-CM

## 2024-11-05 DIAGNOSIS — E11.9 TYPE 2 DIABETES MELLITUS WITHOUT COMPLICATION, WITHOUT LONG-TERM CURRENT USE OF INSULIN (HCC): ICD-10-CM

## 2024-11-05 DIAGNOSIS — I25.10 CORONARY ARTERY DISEASE INVOLVING NATIVE CORONARY ARTERY OF NATIVE HEART WITHOUT ANGINA PECTORIS: Primary | ICD-10-CM

## 2024-11-05 DIAGNOSIS — Z71.85 IMMUNIZATION COUNSELING: ICD-10-CM

## 2024-11-05 DIAGNOSIS — F32.A DEPRESSION, UNSPECIFIED DEPRESSION TYPE: ICD-10-CM

## 2024-11-05 DIAGNOSIS — E78.5 HYPERLIPIDEMIA, UNSPECIFIED HYPERLIPIDEMIA TYPE: ICD-10-CM

## 2024-11-05 DIAGNOSIS — F41.9 ANXIETY: ICD-10-CM

## 2024-11-05 DIAGNOSIS — R05.3 CHRONIC COUGH: ICD-10-CM

## 2024-11-05 PROBLEM — J45.909 ASTHMA (HCC): Status: ACTIVE | Noted: 2024-09-06

## 2024-11-05 PROBLEM — I20.0 UNSTABLE ANGINA (HCC): Status: ACTIVE | Noted: 2024-05-03

## 2024-11-05 PROBLEM — R94.39 ABNORMAL STRESS TEST: Status: ACTIVE | Noted: 2024-06-13

## 2024-11-05 PROBLEM — G47.33 OBSTRUCTIVE SLEEP APNEA: Status: ACTIVE | Noted: 2024-09-06

## 2024-11-05 LAB
CARTRIDGE LOT#: ABNORMAL NUMERIC
HEMOGLOBIN A1C: 5.7 % (ref 4.3–5.6)

## 2024-11-05 PROCEDURE — 83036 HEMOGLOBIN GLYCOSYLATED A1C: CPT | Performed by: STUDENT IN AN ORGANIZED HEALTH CARE EDUCATION/TRAINING PROGRAM

## 2024-11-05 PROCEDURE — 99214 OFFICE O/P EST MOD 30 MIN: CPT | Performed by: STUDENT IN AN ORGANIZED HEALTH CARE EDUCATION/TRAINING PROGRAM

## 2024-11-05 RX ORDER — METOPROLOL SUCCINATE 25 MG/1
25 TABLET, EXTENDED RELEASE ORAL DAILY
Qty: 90 TABLET | Refills: 1 | Status: SHIPPED | OUTPATIENT
Start: 2024-11-05

## 2024-11-05 RX ORDER — CLOPIDOGREL BISULFATE 75 MG/1
75 TABLET ORAL DAILY
COMMUNITY

## 2024-11-05 RX ORDER — ALBUTEROL SULFATE 90 UG/1
2 INHALANT RESPIRATORY (INHALATION) EVERY 4 HOURS PRN
Qty: 18 G | Refills: 2 | Status: SHIPPED | OUTPATIENT
Start: 2024-11-05

## 2024-11-05 RX ORDER — UBIDECARENONE 100 MG
100 CAPSULE ORAL DAILY
COMMUNITY

## 2024-11-05 RX ORDER — SODIUM FLUORIDE 6 MG/ML
PASTE, DENTIFRICE DENTAL
COMMUNITY
Start: 2024-08-04

## 2024-11-05 RX ORDER — FLUTICASONE PROPIONATE 50 MCG
2 SPRAY, SUSPENSION (ML) NASAL DAILY
Qty: 1 EACH | Refills: 1 | Status: SHIPPED | OUTPATIENT
Start: 2024-11-05

## 2024-11-05 RX ORDER — SEMAGLUTIDE 1.34 MG/ML
1 INJECTION, SOLUTION SUBCUTANEOUS WEEKLY
Qty: 3 EACH | Refills: 1 | Status: SHIPPED | OUTPATIENT
Start: 2024-11-05

## 2024-11-05 NOTE — PROGRESS NOTES
Subjective:   Steve Kitchen is a 69 year old male who presents for Medication Follow-Up     69-year-old male coming in for follow-up.  History of DM that is well-controlled on medications.  History of CAD status post stents.  On 07/02/2024 angiography showed 90% fibrotic, focal, ISR in OM branch of CX. Underwent successful, complex PCI with intra arterial lithotripsy and MANPREET placement.  Now doing cardiac rehab 3 times a week.  Continues to follow-up with his cardiologist through [facility].  History of depression.  Stable, no SHIP.  Continues to follow-up with a psychologist every 2 weeks.    Chronic cough that is worse when laying down.  No SOB or difficulty breathing.  Had a chest x-ray on 9/19/2023 that showed clear lungs.  History of asthma.  States historically was on controller inhalers however did not tolerate them as they caused him to cough more. Takes famotidine for acid reflux.    Received flu shot and COVID vaccination through local pharmacy.    History/Other:    Chief Complaint Reviewed and Verified  No Further Nursing Notes to   Review  Tobacco Reviewed  Allergies Reviewed  Medications Reviewed    Problem List Reviewed  Medical History Reviewed  Surgical History   Reviewed  Family History Reviewed  Social History Reviewed         Tobacco:  He has never smoked tobacco.    Current Outpatient Medications   Medication Sig Dispense Refill    SODIUM FLUORIDE 5000 PPM 1.1 % Dental Paste USE AS TOOTHPASTE 1-2 TIMES DAILY. DO NOT DRINK OR EAT FOR 30 MINUTES AFTER BRUSHING. DO NOT SWALLOW      fluticasone propionate 50 MCG/ACT Nasal Suspension 2 sprays by Each Nare route daily. 1 each 1    semaglutide (OZEMPIC, 1 MG/DOSE,) 4 MG/3ML Subcutaneous Solution Pen-injector Inject 1 mg into the skin once a week. 3 each 1    metoprolol succinate ER 25 MG Oral Tablet 24 Hr Take 1 tablet (25 mg total) by mouth daily. 90 tablet 1    albuterol (PROAIR HFA) 108 (90 Base) MCG/ACT Inhalation  Aero Soln Inhale 2 puffs into the lungs every 4 (four) hours as needed for Wheezing. 18 g 2    clopidogrel 75 MG Oral Tab Take 1 tablet (75 mg total) by mouth daily.      Coenzyme Q10 100 MG Oral Cap Take 100 mg by mouth daily.      FENOFIBRATE 145 MG Oral Tab TAKE 1 TABLET BY MOUTH EVERY DAY 90 tablet 1    GLIPIZIDE ER 5 MG Oral Tablet 24 Hr TAKE 1 TABLET (5 MG TOTAL) BY MOUTH DAILY. 90 tablet 1    BUPROPION HCL ER, SR, 200 MG Oral Tablet 12 Hr TAKE 1 TABLET BY MOUTH TWICE A  tablet 1    AMLODIPINE BESY-BENAZEPRIL HCL 10-40 MG Oral Cap TAKE 1 CAPSULE BY MOUTH EVERY DAY 90 capsule 1    FAMOTIDINE 20 MG Oral Tab TAKE 1 TABLET BY MOUTH TWICE A  tablet 1    METFORMIN HCL 1000 MG Oral Tab TAKE 1 TABLET BY MOUTH TWICE A DAY WITH MEALS 180 tablet 1    ROSUVASTATIN 20 MG Oral Tab TAKE 1 TABLET BY MOUTH EVERY DAY AT NIGHT 90 tablet 1    hydroCHLOROthiazide 25 MG Oral Tab Take 1 tablet (25 mg total) by mouth daily. 90 tablet 3    Misc Natural Products (GLUCOSAMINE CHOND CMP ADVANCED) Oral Tab Take by mouth at bedtime.      Multiple Vitamins-Minerals (CENTRUM SILVER 50+MEN) Oral Tab Take by mouth at bedtime.      B Complex Vitamins (VITAMIN B COMPLEX) Oral Tab Take by mouth every morning.      Ascorbic Acid (VITAMIN C) 1000 MG Oral Tab Take 1 tablet (1,000 mg total) by mouth daily.      ferrous sulfate 325 (65 FE) MG Oral Tab EC Take 1 tablet (325 mg total) by mouth at bedtime.      cholecalciferol 50 MCG (2000 UT) Oral Tab Take 1 tablet (2,000 Units total) by mouth at bedtime.      aspirin 81 MG Oral Tab Take 1 tablet (81 mg total) by mouth daily.           Review of Systems:  Review of Systems   Constitutional:  Negative for chills, diaphoresis and fever.   HENT:  Negative for congestion, ear discharge, ear pain, sinus pressure, sinus pain and sore throat.    Eyes:  Negative for pain and discharge.   Respiratory:  Positive for cough. Negative for chest tightness, shortness of breath and wheezing.     Cardiovascular:  Negative for chest pain and palpitations.   Gastrointestinal:  Negative for abdominal pain, diarrhea, nausea and vomiting.   Endocrine: Negative for cold intolerance and heat intolerance.   Genitourinary:  Negative for dysuria, flank pain, frequency and urgency.   Musculoskeletal:  Negative for joint swelling.   Skin:  Negative for rash.   Neurological:  Negative for dizziness, syncope and headaches.   Psychiatric/Behavioral:  Negative for confusion and hallucinations.        Objective:   /78   Pulse 57   Temp 97.1 °F (36.2 °C)   Ht 5' 8\" (1.727 m)   Wt 218 lb (98.9 kg)   SpO2 100%   BMI 33.15 kg/m²  Estimated body mass index is 33.15 kg/m² as calculated from the following:    Height as of this encounter: 5' 8\" (1.727 m).    Weight as of this encounter: 218 lb (98.9 kg).  Physical Exam  Constitutional:       General: He is not in acute distress.     Appearance: Normal appearance. He is obese. He is not ill-appearing or toxic-appearing.   HENT:      Head: Normocephalic and atraumatic.   Cardiovascular:      Rate and Rhythm: Normal rate and regular rhythm.      Heart sounds: Normal heart sounds. No murmur heard.     No gallop.   Pulmonary:      Effort: Pulmonary effort is normal. No respiratory distress.      Breath sounds: Normal breath sounds. No stridor. No wheezing, rhonchi or rales.   Abdominal:      General: Bowel sounds are normal.      Palpations: Abdomen is soft.      Tenderness: There is no abdominal tenderness. There is no guarding.   Musculoskeletal:      Cervical back: Normal range of motion and neck supple. No rigidity or tenderness.   Skin:     General: Skin is warm and dry.   Neurological:      General: No focal deficit present.      Mental Status: He is alert and oriented to person, place, and time. Mental status is at baseline.   Psychiatric:         Mood and Affect: Mood normal.         Behavior: Behavior normal.         Thought Content: Thought content normal.          Judgment: Judgment normal.         Assessment & Plan:     1. Coronary artery disease involving native coronary artery of native heart without angina pectoris (Primary)  On 07/02/2024 angiography showed 90% fibrotic, focal, ISR in OM branch of CX. Underwent successful, complex PCI with intra arterial lithotripsy and MANPREET placement.  Now doing cardiac rehab 3 times a week.   -Continue follow-up with cardiology  2. Status post coronary artery stent placement  -Continue follow-up with cardiology  3. Essential hypertension  Stable.  CPM.  -     Metoprolol Succinate ER; Take 1 tablet (25 mg total) by mouth daily.  Dispense: 90 tablet; Refill: 1  4. Type 2 diabetes mellitus without complication, without long-term current use of insulin ([facility])  Last A1c value was 5.7% done 11/5/2024.  -     POC Hemoglobin A1C  -     Ozempic (1 MG/DOSE); Inject 1 mg into the skin once a week.  Dispense: 3 each; Refill: 1  5. Anxiety  Stable.  No SHIP.  CPM.  -Continue follow-up with psychologist  6. Depression, unspecified depression type  Stable.  No SHIP.  CPM.  -Continue follow-up with psychologist  7. Hyperlipidemia, unspecified hyperlipidemia type  Tolerating medication well.  CPM.  8. Chronic cough  States historically was on controller inhalers however did not tolerate them as they caused him to cough more.  -Continue with famotidine  -Treat PND. Mechanical nasal irrigation with saline. Intranasal saline spray may also be used. Short-term use of intranasal glucocorticoids. Antihistamines are frequently used for symptom relief due to their drying effects  -Pulm follow-up if no improvement  -     PULMONARY - INTERNAL  -     Fluticasone Propionate; 2 sprays by Each Nare route daily.  Dispense: 1 each; Refill: 1  -     Albuterol Sulfate HFA; Inhale 2 puffs into the lungs every 4 (four) hours as needed for Wheezing.  Dispense: 18 g; Refill: 2  9. Immunization counseling  Will check his records about pneumonia immunization and follow  up.            Return in about 6 months (around 5/5/2025).    Esteban Michelle MD, 11/5/2024, 2:42 PM

## 2024-11-05 NOTE — PATIENT INSTRUCTIONS
Check vaccination records for the following at your local pharmacy:  -Shingles vaccine  -Pneumonia vaccination

## 2024-11-24 ENCOUNTER — APPOINTMENT (OUTPATIENT)
Dept: GENERAL RADIOLOGY | Age: 69
End: 2024-11-24
Attending: NURSE PRACTITIONER
Payer: MEDICARE

## 2024-11-24 ENCOUNTER — HOSPITAL ENCOUNTER (OUTPATIENT)
Age: 69
Discharge: HOME | End: 2024-11-24
Payer: MEDICARE

## 2024-11-24 VITALS
HEART RATE: 90 BPM | SYSTOLIC BLOOD PRESSURE: 141 MMHG | TEMPERATURE: 98 F | OXYGEN SATURATION: 100 % | RESPIRATION RATE: 20 BRPM | DIASTOLIC BLOOD PRESSURE: 73 MMHG

## 2024-11-24 DIAGNOSIS — J45.21 MILD INTERMITTENT ASTHMA WITH EXACERBATION (HCC): ICD-10-CM

## 2024-11-24 DIAGNOSIS — R05.1 ACUTE COUGH: ICD-10-CM

## 2024-11-24 DIAGNOSIS — J01.10 ACUTE NON-RECURRENT FRONTAL SINUSITIS: Primary | ICD-10-CM

## 2024-11-24 DIAGNOSIS — J20.9 ACUTE BRONCHITIS, UNSPECIFIED ORGANISM: ICD-10-CM

## 2024-11-24 PROCEDURE — 94640 AIRWAY INHALATION TREATMENT: CPT | Performed by: NURSE PRACTITIONER

## 2024-11-24 PROCEDURE — 71046 X-RAY EXAM CHEST 2 VIEWS: CPT | Performed by: NURSE PRACTITIONER

## 2024-11-24 PROCEDURE — 99203 OFFICE O/P NEW LOW 30 MIN: CPT | Performed by: NURSE PRACTITIONER

## 2024-11-24 RX ORDER — DOXYCYCLINE 100 MG/1
100 CAPSULE ORAL 2 TIMES DAILY
Qty: 14 CAPSULE | Refills: 0 | Status: SHIPPED | OUTPATIENT
Start: 2024-11-24 | End: 2024-12-01

## 2024-11-24 RX ORDER — IPRATROPIUM BROMIDE AND ALBUTEROL SULFATE 2.5; .5 MG/3ML; MG/3ML
3 SOLUTION RESPIRATORY (INHALATION) ONCE
Status: COMPLETED | OUTPATIENT
Start: 2024-11-24 | End: 2024-11-24

## 2024-11-24 RX ORDER — BENZONATATE 200 MG/1
200 CAPSULE ORAL 3 TIMES DAILY PRN
Qty: 20 CAPSULE | Refills: 0 | Status: SHIPPED | OUTPATIENT
Start: 2024-11-24 | End: 2024-12-14

## 2024-11-24 NOTE — DISCHARGE INSTRUCTIONS
As we discussed this appears to be a viral infection which is triggered your asthma flareup.  Also you have an acute sinus infection.  Take the antibiotic as prescribed for the sinus infection, apply warm compresses and steam may also be helpful for the sinuses.  Take Tylenol as needed to decrease the sinus discomfort.  For the coughing and the asthma flareup due to bronchitis and viral infection continue over-the-counter medications, increased oral fluids, rest is much as possible, use your previously prescribed medications including the albuterol 2 puffs every 4 hours as needed for difficulty breathing, chest tightness or wheezing.  Schedule a follow-up appointment with your primary care  provider in the next 2 to 3 days for reevaluation.  If you develop any chest pain, shortness of breath, high fever, or other concerns return here or go directly to the emergency department.  Thank you for choosing our [facility] for your medical condition today. Please be sure to follow up with your primary care provider in 2 days if no improvement, or as directed. If any worsening of your symptoms or other concerns call your primary care provider, return here or go to the emergency department.

## 2024-11-24 NOTE — ED PROVIDER NOTES
Patient Seen in: [facility]      History     Chief Complaint   Patient presents with    Cough     Stated Complaint: Cough    Subjective:   This is a 69-year-old  male who presents with a chief complaint of a cough and sinus pressure over the frontal sinuses and behind his eyes for the past week.  Patient states he seemed to be getting better until 2 days ago when the pain and pressure behind his eyes and in the frontal sinuses increased he has been treating his symptoms with Mucinex over-the-counter cough and flu medications but the symptoms returned shortly thereafter.  The patient does have a history of reactive airway disease and has albuterol at home he states he has been using the albuterol approximately once a day.  He denies any fever or chills he denies any fatigue he denies any myalgias or arthralgias.  The patient states his daughter is also ill with similar symptoms and she has been treated with steroids no antibiotics.  The patient states that he has not been going to cardiac rehab due to his symptoms.  He has been hesitant to take other over-the-counter medications because he is not sure what he could take with his cardiac diagnoses.  Patient was last seen by his cardiologist within the past month, his diagnosis at that time included unstable angina and recurrent nocturnal cough.  Congestive heart failure was ruled out and it was the cough was thought to be due to postnasal drip.  The patient denies any chest pain, chest tightness, shortness of breath, difficulty breathing, palpitations, lightheadedness or dizziness at this time.  The patient has been using the albuterol mostly for the coughing.    The history is provided by the patient.   The patient has a past medical history of coronary artery disease, osteoarthritis, migraines, allergic rhinitis, high blood pressure, hyperlipidemia, recurrent pneumonia, diabetes, sleep apnea, anxiety, depression, esophageal reflux, and  recurrent otitis media          Objective:     Past Medical History:    AK (actinic keratosis)    Skin, left helix of ear    Allergic rhinitis    Anemia    Anxiety    Anxiety state    \"mild\"    Asthma (HCC)    Back problem    Blanchard's esophagus determined by endoscopy    BPH (benign prostatic hyperplasia)    Coronary atherosclerosis    Depression    Diabetes (HCC)    Diverticulosis of large intestine    Esophageal reflux    Essential hypertension    Hearing impairment    mild hearing loss    High blood pressure    High cholesterol    History of kidney stones    Hyperlipidemia    Migraines    Obesity    Osteoarthritis    Pneumonia due to organism    Recovering alcoholic (HCC)    Recurrent acute otitis media    Recurrent pneumonia    Twice in 1972 only, 1 bacterial, 1 viral    Scoliosis    Sleep apnea    CPAP    Visual impairment    wears glasses              Past Surgical History:   Procedure Laterality Date    Angioplasty (coronary)  2012, 12/2020, 01/2021    Cath drug eluting stent  2012, 12/2020, 01/2021    x3    Colonoscopy  07/2021    Colonoscopy  07/2021    Cystoscopy,insert ureteral stent  06/2022    Stent removal    Cystoscopy,ureteroscopy,lithotripsy Right 05/2022    Egd N/A 07/20/2021    Procedure: ESOPHAGOGASTRODUODENOSCOPY w/ bxs , COLONOSCOPY w/ polypectomy;  Surgeon: Lucio Souza MD;  [place] [facility] [place] [facility]                Social History     Socioeconomic History    Marital status:    Tobacco Use    Smoking status: Never     Passive exposure: Never    Smokeless tobacco: Never   Vaping Use    Vaping status: Never Used   Substance and Sexual Activity    Alcohol use: Not Currently     Comment: None since 07/04/1994    Drug use: Not Currently     Types: Cannabis     Comment: None since 1994   Other Topics Concern    Grew up on a farm No    History of tanning Yes     Comment: , [phone]    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   HENT:  Positive for congestion, postnasal drip, sinus pressure, sinus pain and sore throat (Patient believes the sore throat is due to his postnasal drip).    Eyes:  Negative for redness.   Respiratory:  Positive for cough and wheezing. Negative for chest tightness, shortness of breath and stridor.    Cardiovascular:  Negative for chest pain and palpitations.   Gastrointestinal:  Negative for abdominal pain, diarrhea, nausea and vomiting.   Musculoskeletal:  Negative for myalgias.   Neurological:  Positive for headaches (Frontal headache from sinus pain and pressure). Negative for dizziness and weakness.       Positive for stated complaint: Cough  Other systems are as noted in HPI.  Constitutional and vital signs reviewed.      All other systems reviewed and negative except as noted above.    Physical Exam     ED Triage Vitals [11/24/24 1053]   /73   Pulse 90   Resp 20   Temp 98.2 °F (36.8 °C)   Temp src Oral   SpO2 100 %   O2 Device None (Room air)       Current Vitals:   Vital Signs  BP: 141/73  Pulse: 90  Resp: 20  Temp: 98.2 °F (36.8 °C)  Temp src: Oral    Oxygen Therapy  SpO2: 100 %  O2 Device: None (Room air) (Simultaneous filing. User may not have seen previous data.)        Physical Exam  Vitals and nursing note reviewed.   Constitutional:       General: He is not in acute distress.     Appearance: Normal appearance. He is normal weight. He is not ill-appearing or toxic-appearing.   HENT:      Head: Normocephalic.      Right Ear: Tympanic membrane and ear canal normal.      Left Ear: Tympanic membrane and ear canal normal.       Nose: Congestion and rhinorrhea (Scanty rhinorrhea) present.      Comments: Very tender to palpation over frontal sinuses bilaterally and periorbital area     Mouth/Throat:      Mouth: Mucous membranes are moist.      Pharynx: Oropharynx is clear. No oropharyngeal exudate or posterior oropharyngeal erythema.   Eyes:      General:         Right eye: No discharge.         Left eye: No discharge.      Conjunctiva/sclera: Conjunctivae normal.      Pupils: Pupils are equal, round, and reactive to light.   Neck:      Vascular: No carotid bruit.   Cardiovascular:      Rate and Rhythm: Normal rate and regular rhythm.      Heart sounds: Normal heart sounds. No murmur heard.  Pulmonary:      Effort: Pulmonary effort is normal. No respiratory distress.      Breath sounds: Rhonchi (Scattered rhonchi prior to DuoNeb) present. No wheezing or rales.   Abdominal:      General: There is no distension.      Palpations: Abdomen is soft.      Tenderness: There is no abdominal tenderness.   Musculoskeletal:      Cervical back: Neck supple.   Lymphadenopathy:      Cervical: No cervical adenopathy.   Skin:     General: Skin is warm and dry.      Findings: No rash.   Neurological:      General: No focal deficit present.      Mental Status: He is alert and oriented to person, place, and time.      Coordination: Coordination normal.      Gait: Gait normal.   Psychiatric:         Mood and Affect: Mood normal.         Behavior: Behavior normal.         Thought Content: Thought content normal.             ED Course   Labs Reviewed - No data to display  Chest x-ray reviewed and negative for pneumonia .  Discussed with the patient that this is most likely acute frontal sinusitis.  The cough may be exacerbated due to his reactive airway disease triggered by the infection.  We reviewed care at home and which medications he can take to manage his symptoms.  And strongly encouraged him to follow-up with his primary care provider we reviewed signs  and symptoms which would necessitate a reevaluation here in immediate care or directly to the emergency department patient agrees with this plan of care.  He was strongly encouraged also to use the albuterol inhaler 2 puffs every 4 hours as he needs it for any tight cough at home.        Counseled: Patient, regarding diagnosis, regarding treatment plan, regarding diagnostic results, regarding prescription, I have discussed with the patient the results of tests, differential diagnosis, and warning signs and symptoms that should prompt immediate return. The patient understands these instructions and agrees to the follow-up plan provided. There is no barriers to learning. Appropriate f/u given. Emergency precautions given. Patient agrees to return for any concerns/ problems/complications.         MDM             Medical Decision Making  Differential diagnoses: Pneumonia, asthma exacerbation, acute sinusitis, COVID, influenza, viral upper respiratory infection with cough, acute bronchitis, other viral syndrome.  Cormorbidities adding complexity: Hypertension, hyperlipidemia, asthma, cardiac disease, allergic rhinitis, diabetes, GERD, recurrent pneumonia  My independent interpretation of studies: Chest x-ray as read by the radiologist, no change in mild by lateral basilar atelectasis.  No pneumonia.  Social determinants of health affecting care: None noted  Shared decision making done by: The patient and myself          Disposition and Plan     Clinical Impression:  1. Acute non-recurrent frontal sinusitis    2. Acute cough    3. Acute bronchitis, unspecified organism    4. Mild intermittent asthma with exacerbation (HCC)         Disposition:  Discharge  11/24/2024 12:17 pm    Follow-up:  No follow-up provider specified.        Medications Prescribed:  Discharge Medication List as of 11/24/2024 12:18 PM        START taking these medications    Details   doxycycline 100 MG Oral Cap Take 1 capsule (100 mg total) by mouth 2  (two) times daily for 7 days., Normal, Disp-14 capsule, R-0      benzonatate 200 MG Oral Cap Take 1 capsule (200 mg total) by mouth 3 (three) times daily as needed for cough., Normal, Disp-20 capsule, R-0                 Supplementary Documentation:

## 2024-11-26 ENCOUNTER — OFFICE VISIT (OUTPATIENT)
Dept: FAMILY MEDICINE CLINIC | Facility: CLINIC | Age: 69
End: 2024-11-26
Payer: MEDICARE

## 2024-11-26 VITALS
SYSTOLIC BLOOD PRESSURE: 112 MMHG | BODY MASS INDEX: 33.04 KG/M2 | DIASTOLIC BLOOD PRESSURE: 60 MMHG | OXYGEN SATURATION: 96 % | TEMPERATURE: 98 F | HEIGHT: 68 IN | HEART RATE: 75 BPM | WEIGHT: 218 LBS

## 2024-11-26 DIAGNOSIS — R05.3 CHRONIC COUGH: ICD-10-CM

## 2024-11-26 DIAGNOSIS — R49.0 HOARSENESS OF VOICE: ICD-10-CM

## 2024-11-26 DIAGNOSIS — H61.21 IMPACTED CERUMEN OF RIGHT EAR: ICD-10-CM

## 2024-11-26 DIAGNOSIS — R05.1 ACUTE COUGH: Primary | ICD-10-CM

## 2024-11-26 PROCEDURE — 99214 OFFICE O/P EST MOD 30 MIN: CPT | Performed by: STUDENT IN AN ORGANIZED HEALTH CARE EDUCATION/TRAINING PROGRAM

## 2024-11-26 PROCEDURE — 69210 REMOVE IMPACTED EAR WAX UNI: CPT | Performed by: STUDENT IN AN ORGANIZED HEALTH CARE EDUCATION/TRAINING PROGRAM

## 2024-11-26 NOTE — PROGRESS NOTES
Subjective:   Steve Kitchen is a 69 year old male who presents for Urgent Care F/u (Cough )     69-year-old male coming in to follow-up in regards to cough and immediate care visit on 11/24/2024.  At the time patient was complaining of a cough and sinus pressure. Daghter and son in law also had a cold. Patient did not test for covid.  Had a chest x-ray that showed no acute cardiopulmonary abnormalities.  He was diagnosed with frontal sinusitis, acute bronchitis and asthma exacerbation, prescribed doxycycline and benzonatate.  Has been using his albuterol inhaler with good relief.  Did not go to cardiac rehab last week and yesterday due to current illness.  Now using a wedge pillow which helps raise his head up at night.  Hoarseness of voice started since approximately September.  Denies fever, chills, SOB, difficulty breathing.    History/Other:    Chief Complaint Reviewed and Verified  Nursing Notes Reviewed and   Verified  Tobacco Reviewed  Allergies Reviewed  Medications Reviewed    Problem List Reviewed  Medical History Reviewed  Surgical History   Reviewed  Family History Reviewed  Social History Reviewed         Tobacco:  He has never smoked tobacco.    Current Outpatient Medications   Medication Sig Dispense Refill    omeprazole 20 MG Oral Capsule Delayed Release Take 1 capsule (20 mg total) by mouth every morning for 14 days. 30 capsule 0    doxycycline 100 MG Oral Cap Take 1 capsule (100 mg total) by mouth 2 (two) times daily for 7 days. 14 capsule 0    benzonatate 200 MG Oral Cap Take 1 capsule (200 mg total) by mouth 3 (three) times daily as needed for cough. 20 capsule 0    clopidogrel 75 MG Oral Tab Take 1 tablet (75 mg total) by mouth daily.      Coenzyme Q10 100 MG Oral Cap Take 100 mg by mouth daily.      SODIUM FLUORIDE 5000 PPM 1.1 % Dental Paste USE AS TOOTHPASTE 1-2 TIMES DAILY. DO NOT DRINK OR EAT FOR 30 MINUTES AFTER BRUSHING. DO NOT SWALLOW      fluticasone propionate 50  MCG/ACT Nasal Suspension 2 sprays by Each Nare route daily. 1 each 1    semaglutide (OZEMPIC, 1 MG/DOSE,) 4 MG/3ML Subcutaneous Solution Pen-injector Inject 1 mg into the skin once a week. 3 each 1    metoprolol succinate ER 25 MG Oral Tablet 24 Hr Take 1 tablet (25 mg total) by mouth daily. 90 tablet 1    albuterol (PROAIR HFA) 108 (90 Base) MCG/ACT Inhalation Aero Soln Inhale 2 puffs into the lungs every 4 (four) hours as needed for Wheezing. 18 g 2    FENOFIBRATE 145 MG Oral Tab TAKE 1 TABLET BY MOUTH EVERY DAY 90 tablet 1    GLIPIZIDE ER 5 MG Oral Tablet 24 Hr TAKE 1 TABLET (5 MG TOTAL) BY MOUTH DAILY. 90 tablet 1    BUPROPION HCL ER, SR, 200 MG Oral Tablet 12 Hr TAKE 1 TABLET BY MOUTH TWICE A  tablet 1    AMLODIPINE BESY-BENAZEPRIL HCL 10-40 MG Oral Cap TAKE 1 CAPSULE BY MOUTH EVERY DAY 90 capsule 1    METFORMIN HCL 1000 MG Oral Tab TAKE 1 TABLET BY MOUTH TWICE A DAY WITH MEALS 180 tablet 1    ROSUVASTATIN 20 MG Oral Tab TAKE 1 TABLET BY MOUTH EVERY DAY AT NIGHT 90 tablet 1    hydroCHLOROthiazide 25 MG Oral Tab Take 1 tablet (25 mg total) by mouth daily. 90 tablet 3    Misc Natural Products (GLUCOSAMINE CHOND CMP ADVANCED) Oral Tab Take by mouth at bedtime.      Multiple Vitamins-Minerals (CENTRUM SILVER 50+MEN) Oral Tab Take by mouth at bedtime.      B Complex Vitamins (VITAMIN B COMPLEX) Oral Tab Take by mouth every morning.      Ascorbic Acid (VITAMIN C) 1000 MG Oral Tab Take 1 tablet (1,000 mg total) by mouth daily.      ferrous sulfate 325 (65 FE) MG Oral Tab EC Take 1 tablet (325 mg total) by mouth at bedtime.      cholecalciferol 50 MCG (2000 UT) Oral Tab Take 1 tablet (2,000 Units total) by mouth at bedtime.      aspirin 81 MG Oral Tab Take 1 tablet (81 mg total) by mouth daily.           Review of Systems:  Review of Systems   Constitutional:  Negative for chills, diaphoresis and fever.   HENT:  Negative for congestion, ear discharge, ear pain, sinus pressure, sinus pain and sore throat.     Eyes:  Negative for pain and discharge.   Respiratory:  Positive for cough. Negative for chest tightness, shortness of breath and wheezing.    Cardiovascular:  Negative for chest pain and palpitations.   Gastrointestinal:  Negative for abdominal pain, diarrhea, nausea and vomiting.   Endocrine: Negative for cold intolerance and heat intolerance.   Genitourinary:  Negative for dysuria, flank pain, frequency and urgency.   Musculoskeletal:  Negative for joint swelling.   Skin:  Negative for rash.   Neurological:  Negative for dizziness, syncope and headaches.   Psychiatric/Behavioral:  Negative for confusion and hallucinations.        Objective:   /60   Pulse 75   Temp 97.9 °F (36.6 °C)   Ht 5' 8\" (1.727 m)   Wt 218 lb (98.9 kg)   SpO2 96%   BMI 33.15 kg/m²  Estimated body mass index is 33.15 kg/m² as calculated from the following:    Height as of this encounter: 5' 8\" (1.727 m).    Weight as of this encounter: 218 lb (98.9 kg).  Physical Exam  Constitutional:       General: He is not in acute distress.     Appearance: Normal appearance. He is not ill-appearing or toxic-appearing.   HENT:      Head: Normocephalic and atraumatic.      Left Ear: Tympanic membrane and ear canal normal.      Ears:      Comments: Right ear cerumen impaction.  TM and canal WNL after cerumen removal.     Mouth/Throat:      Mouth: Mucous membranes are moist.      Pharynx: Oropharynx is clear. No oropharyngeal exudate or posterior oropharyngeal erythema.   Cardiovascular:      Rate and Rhythm: Normal rate and regular rhythm.      Heart sounds: Normal heart sounds. No murmur heard.     No gallop.   Pulmonary:      Effort: Pulmonary effort is normal. No respiratory distress.      Breath sounds: Normal breath sounds. No stridor. No wheezing, rhonchi or rales.   Abdominal:      General: Bowel sounds are normal.      Palpations: Abdomen is soft.      Tenderness: There is no abdominal tenderness. There is no guarding.    Musculoskeletal:      Cervical back: Normal range of motion and neck supple.   Skin:     General: Skin is warm and dry.   Neurological:      General: No focal deficit present.      Mental Status: He is alert and oriented to person, place, and time. Mental status is at baseline.   Psychiatric:         Mood and Affect: Mood normal.         Behavior: Behavior normal.         Thought Content: Thought content normal.         Judgment: Judgment normal.       XR CHEST PA + LAT CHEST (CPT=71046)    Result Date: 11/24/2024  CONCLUSION: No acute cardiopulmonary abnormality.    Dictated by (CST): Russ Hoffman MD on 11/24/2024 at 11:54 AM     Finalized by (CST): Russ Hoffman MD on 11/24/2024 at 11:55 AM             Assessment & Plan:   1. Acute cough (Primary)  Acute on chronic.  Seen at  on 11/24/2024.  -Acute cough improved after immediate care treatment.  2. Chronic cough  Historically was on controller inhalers however did not tolerate them as they caused him to cough more.   Has been doing mechanical nasal saline irrigations and Flonase.  -Trial of 2 weeks of PPI then revert back to famotidine.  Cardiology agreeable per patient.  -If no improvement, ENT follow-up.  -If continues to have no improvement, will have to reconsider use of ACE inhibitor (Benazepril).  -     Omeprazole; Take 1 capsule (20 mg total) by mouth every morning for 14 days.  Dispense: 30 capsule; Refill: 0  3. Hoarseness of voice  Hoarseness of voice since September 2024.  ?Component of acid reflux.  Patient already on famotidine.  Will change therapy to PPI.  -ENT follow-up if no improvement with PPI  -     Omeprazole; Take 1 capsule (20 mg total) by mouth every morning for 14 days.  Dispense: 30 capsule; Refill: 0  -     ENT - INTERNAL  4. Impacted cerumen of right ear   Right ear cerumen removed with curette.  TM and canal WNL after cerumen removal.  Patient tolerated the procedure well with no complications.            Return in about 6 weeks  (around 1/7/2025), or if symptoms worsen or fail to improve.    Esteban Michelle MD, 11/26/2024, 4:52 PM

## 2024-12-02 ENCOUNTER — NURSE TRIAGE (OUTPATIENT)
Dept: FAMILY MEDICINE CLINIC | Facility: CLINIC | Age: 69
End: 2024-12-02

## 2024-12-02 ENCOUNTER — APPOINTMENT (OUTPATIENT)
Dept: CT IMAGING | Facility: HOSPITAL | Age: 69
End: 2024-12-02
Attending: EMERGENCY MEDICINE
Payer: MEDICARE

## 2024-12-02 ENCOUNTER — APPOINTMENT (OUTPATIENT)
Dept: GENERAL RADIOLOGY | Facility: HOSPITAL | Age: 69
End: 2024-12-02
Attending: EMERGENCY MEDICINE
Payer: MEDICARE

## 2024-12-02 ENCOUNTER — HOSPITAL ENCOUNTER (EMERGENCY)
Facility: HOSPITAL | Age: 69
Discharge: HOME | End: 2024-12-02
Attending: EMERGENCY MEDICINE
Payer: MEDICARE

## 2024-12-02 ENCOUNTER — HOSPITAL ENCOUNTER (OUTPATIENT)
Age: 69
Discharge: ANOTHER HOSPITAL | End: 2024-12-02
Payer: MEDICARE

## 2024-12-02 VITALS
DIASTOLIC BLOOD PRESSURE: 79 MMHG | HEART RATE: 88 BPM | RESPIRATION RATE: 16 BRPM | SYSTOLIC BLOOD PRESSURE: 147 MMHG | TEMPERATURE: 98 F | OXYGEN SATURATION: 98 %

## 2024-12-02 VITALS
HEART RATE: 79 BPM | SYSTOLIC BLOOD PRESSURE: 110 MMHG | DIASTOLIC BLOOD PRESSURE: 64 MMHG | RESPIRATION RATE: 19 BRPM | OXYGEN SATURATION: 96 % | TEMPERATURE: 97 F

## 2024-12-02 DIAGNOSIS — R06.6 INTRACTABLE HICCUPS: Primary | ICD-10-CM

## 2024-12-02 DIAGNOSIS — R05.1 ACUTE COUGH: ICD-10-CM

## 2024-12-02 DIAGNOSIS — K21.00 GASTROESOPHAGEAL REFLUX DISEASE WITH ESOPHAGITIS WITHOUT HEMORRHAGE: ICD-10-CM

## 2024-12-02 DIAGNOSIS — J18.9 COMMUNITY ACQUIRED PNEUMONIA OF RIGHT LOWER LOBE OF LUNG: Primary | ICD-10-CM

## 2024-12-02 DIAGNOSIS — R06.6 HICCUPS: ICD-10-CM

## 2024-12-02 LAB
ALBUMIN SERPL-MCNC: 4.8 G/DL (ref 3.2–4.8)
ALP LIVER SERPL-CCNC: 68 U/L
ALT SERPL-CCNC: 15 U/L
ANION GAP SERPL CALC-SCNC: 9 MMOL/L (ref 0–18)
AST SERPL-CCNC: 16 U/L (ref ?–34)
ATRIAL RATE: 74 BPM
ATRIAL RATE: 87 BPM
BASOPHILS # BLD AUTO: 0.02 X10(3) UL (ref 0–0.2)
BASOPHILS NFR BLD AUTO: 0.1 %
BILIRUB DIRECT SERPL-MCNC: 0.2 MG/DL (ref ?–0.3)
BILIRUB SERPL-MCNC: 0.6 MG/DL (ref 0.2–1.1)
BILIRUB UR QL: NEGATIVE
BUN BLD-MCNC: 10 MG/DL (ref 9–23)
BUN/CREAT SERPL: 9.8 (ref 10–20)
CALCIUM BLD-MCNC: 10.6 MG/DL (ref 8.7–10.4)
CHLORIDE SERPL-SCNC: 105 MMOL/L (ref 98–112)
CLARITY UR: CLEAR
CO2 SERPL-SCNC: 24 MMOL/L (ref 21–32)
COLOR UR: YELLOW
CREAT BLD-MCNC: 1.02 MG/DL
DEPRECATED RDW RBC AUTO: 44.5 FL (ref 35.1–46.3)
EGFRCR SERPLBLD CKD-EPI 2021: 80 ML/MIN/1.73M2 (ref 60–?)
EOSINOPHIL # BLD AUTO: 0.08 X10(3) UL (ref 0–0.7)
EOSINOPHIL NFR BLD AUTO: 0.5 %
ERYTHROCYTE [DISTWIDTH] IN BLOOD BY AUTOMATED COUNT: 14.1 % (ref 11–15)
GLUCOSE BLD-MCNC: 124 MG/DL (ref 70–99)
GLUCOSE UR-MCNC: NORMAL MG/DL
HCT VFR BLD AUTO: 32.4 %
HGB BLD-MCNC: 10.8 G/DL
HGB UR QL STRIP.AUTO: NEGATIVE
IMM GRANULOCYTES # BLD AUTO: 0.16 X10(3) UL (ref 0–1)
IMM GRANULOCYTES NFR BLD: 1.1 %
KETONES UR-MCNC: NEGATIVE MG/DL
LEUKOCYTE ESTERASE UR QL STRIP.AUTO: NEGATIVE
LYMPHOCYTES # BLD AUTO: 0.85 X10(3) UL (ref 1–4)
LYMPHOCYTES NFR BLD AUTO: 5.7 %
MCH RBC QN AUTO: 29 PG (ref 26–34)
MCHC RBC AUTO-ENTMCNC: 33.3 G/DL (ref 31–37)
MCV RBC AUTO: 87.1 FL
MONOCYTES # BLD AUTO: 2.27 X10(3) UL (ref 0.1–1)
MONOCYTES NFR BLD AUTO: 15.3 %
NEUTROPHILS # BLD AUTO: 11.43 X10 (3) UL (ref 1.5–7.7)
NEUTROPHILS # BLD AUTO: 11.43 X10(3) UL (ref 1.5–7.7)
NEUTROPHILS NFR BLD AUTO: 77.3 %
NITRITE UR QL STRIP.AUTO: NEGATIVE
OSMOLALITY SERPL CALC.SUM OF ELEC: 286 MOSM/KG (ref 275–295)
P AXIS: 48 DEGREES
P AXIS: 54 DEGREES
P-R INTERVAL: 162 MS
P-R INTERVAL: 172 MS
PH UR: 7.5 [PH] (ref 5–8)
PLATELET # BLD AUTO: 296 10(3)UL (ref 150–450)
POTASSIUM SERPL-SCNC: 4 MMOL/L (ref 3.5–5.1)
PROT SERPL-MCNC: 7.2 G/DL (ref 5.7–8.2)
PROT UR-MCNC: NEGATIVE MG/DL
Q-T INTERVAL: 382 MS
Q-T INTERVAL: 406 MS
QRS DURATION: 92 MS
QRS DURATION: 92 MS
QTC CALCULATION (BEZET): 450 MS
QTC CALCULATION (BEZET): 459 MS
R AXIS: 30 DEGREES
R AXIS: 46 DEGREES
RBC # BLD AUTO: 3.72 X10(6)UL
SODIUM SERPL-SCNC: 138 MMOL/L (ref 136–145)
SP GR UR STRIP: >1.03 (ref 1–1.03)
T AXIS: 46 DEGREES
T AXIS: 51 DEGREES
TROPONIN I SERPL HS-MCNC: 4 NG/L
UROBILINOGEN UR STRIP-ACNC: NORMAL
VENTRICULAR RATE: 74 BPM
VENTRICULAR RATE: 87 BPM
WBC # BLD AUTO: 14.8 X10(3) UL (ref 4–11)

## 2024-12-02 PROCEDURE — 93010 ELECTROCARDIOGRAM REPORT: CPT

## 2024-12-02 PROCEDURE — 96375 TX/PRO/DX INJ NEW DRUG ADDON: CPT

## 2024-12-02 PROCEDURE — 80076 HEPATIC FUNCTION PANEL: CPT | Performed by: EMERGENCY MEDICINE

## 2024-12-02 PROCEDURE — 80048 BASIC METABOLIC PNL TOTAL CA: CPT | Performed by: EMERGENCY MEDICINE

## 2024-12-02 PROCEDURE — 85025 COMPLETE CBC W/AUTO DIFF WBC: CPT | Performed by: EMERGENCY MEDICINE

## 2024-12-02 PROCEDURE — 85060 BLOOD SMEAR INTERPRETATION: CPT | Performed by: EMERGENCY MEDICINE

## 2024-12-02 PROCEDURE — 71045 X-RAY EXAM CHEST 1 VIEW: CPT | Performed by: EMERGENCY MEDICINE

## 2024-12-02 PROCEDURE — 93000 ELECTROCARDIOGRAM COMPLETE: CPT | Performed by: NURSE PRACTITIONER

## 2024-12-02 PROCEDURE — 99284 EMERGENCY DEPT VISIT MOD MDM: CPT

## 2024-12-02 PROCEDURE — 99215 OFFICE O/P EST HI 40 MIN: CPT | Performed by: NURSE PRACTITIONER

## 2024-12-02 PROCEDURE — 74177 CT ABD & PELVIS W/CONTRAST: CPT | Performed by: EMERGENCY MEDICINE

## 2024-12-02 PROCEDURE — 84484 ASSAY OF TROPONIN QUANT: CPT | Performed by: EMERGENCY MEDICINE

## 2024-12-02 PROCEDURE — 71260 CT THORAX DX C+: CPT | Performed by: EMERGENCY MEDICINE

## 2024-12-02 PROCEDURE — 99285 EMERGENCY DEPT VISIT HI MDM: CPT

## 2024-12-02 PROCEDURE — 93005 ELECTROCARDIOGRAM TRACING: CPT

## 2024-12-02 PROCEDURE — 96365 THER/PROPH/DIAG IV INF INIT: CPT

## 2024-12-02 PROCEDURE — 81003 URINALYSIS AUTO W/O SCOPE: CPT | Performed by: EMERGENCY MEDICINE

## 2024-12-02 RX ORDER — DIPHENHYDRAMINE HYDROCHLORIDE 50 MG/ML
25 INJECTION INTRAMUSCULAR; INTRAVENOUS ONCE
Status: COMPLETED | OUTPATIENT
Start: 2024-12-02 | End: 2024-12-02

## 2024-12-02 RX ORDER — OMEPRAZOLE 40 MG/1
40 CAPSULE, DELAYED RELEASE ORAL DAILY
Qty: 30 CAPSULE | Refills: 0 | Status: SHIPPED | OUTPATIENT
Start: 2024-12-02 | End: 2025-01-01

## 2024-12-02 RX ORDER — AZITHROMYCIN 250 MG/1
TABLET, FILM COATED ORAL
Qty: 6 TABLET | Refills: 0 | Status: SHIPPED | OUTPATIENT
Start: 2024-12-02 | End: 2024-12-07

## 2024-12-02 NOTE — TELEPHONE ENCOUNTER
RN spoke with patient regarding [employer] message.    Patient states he has had the \"hiccups\" for approximately one week. Patient is still having a productive cough.     Patient does not feel he has a fever, no chills.     Patient has been having some shortness of breath, he using his albuterol inhaler every 4-6 hours for the the last week.     Patient denies chest pain without coughing or syncope.     Patient advised to proceed to the immediate care or emergency room for an evaluation of his symptoms today. Patient is agreeable to immediate care in [place] today.     Routed to MD for review.    Reason for Disposition   MILD difficulty breathing (e.g., minimal/no SOB at rest, SOB with walking, pulse <100) and still present when not coughing   Patient sounds very sick or weak to the triager    Protocols used: Cough-A-OH

## 2024-12-02 NOTE — ED QUICK NOTES
Rounding Completed    Plan of Care reviewed. Waiting for Thorazine infusion to finish.  Elimination needs assessed.  Provided fresh ice water.    Bed is locked and in lowest position. Call light within reach.

## 2024-12-02 NOTE — ED INITIAL ASSESSMENT (HPI)
Pt to ED for hiccups and a cough for the past week. Pt states he has had a cough for the past week, put on antibiotics, but still has a cough. Pt denies any chest pain.

## 2024-12-02 NOTE — DISCHARGE INSTRUCTIONS
Augmentin and Zithromax as prescribed. Increase Omeprazole to 40mg daily. Drink plenty of fluids.

## 2024-12-02 NOTE — ED PROVIDER NOTES
Patient Seen in: [facility] Emergency Department      History     Chief Complaint   Patient presents with    Hiccups     Stated Complaint: intractable hiccups for a week    Subjective:   HPI      Patient presents the emergency department complaining of 1 week of intractable hiccups.  He states he just completed a course of antibiotics for pneumonia or bronchitis manage had essentially intractable hiccups for the last 3 days.  He states that he has had episodes where he feels like his throat \"locks up\" for second which then seems to relieve the hiccups for quite a while.  He denies chest pain, nausea or vomiting.  There is no other aggravating or alleviating factors.    Objective:     Past Medical History:    AK (actinic keratosis)    Skin, left helix of ear    Allergic rhinitis    Anemia    Anxiety    Anxiety state    \"mild\"    Asthma (HCC)    Back problem    Blanchard's esophagus determined by endoscopy    BPH (benign prostatic hyperplasia)    Coronary atherosclerosis    Depression    Diabetes (HCC)    Diverticulosis of large intestine    Esophageal reflux    Essential hypertension    Hearing impairment    mild hearing loss    High blood pressure    High cholesterol    History of kidney stones    Hyperlipidemia    Migraines    Obesity    Osteoarthritis    Pneumonia due to organism    Recovering alcoholic (HCC)    Recurrent acute otitis media    Recurrent pneumonia    Twice in 1972 only, 1 bacterial, 1 viral    Scoliosis    Sleep apnea    CPAP    Visual impairment    wears glasses              Past Surgical History:   Procedure Laterality Date    Angioplasty (coronary)  2012, 12/2020, 01/2021    Cath drug eluting stent  2012, 12/2020, 01/2021    x3    Colonoscopy  07/2021    Colonoscopy  07/2021    Cystoscopy,insert ureteral stent  06/2022    Stent removal    Cystoscopy,ureteroscopy,lithotripsy Right 05/2022    Egd N/A 07/20/2021    Procedure: ESOPHAGOGASTRODUODENOSCOPY w/ bxs , COLONOSCOPY w/ polypectomy;   Surgeon: Lucio Souza MD;  [facility]                Social History     Socioeconomic History    Marital status:    Tobacco Use    Smoking status: Never     Passive exposure: Never    Smokeless tobacco: Never   Vaping Use    Vaping status: Never Used   Substance and Sexual Activity    Alcohol use: Not Currently     Comment: None since 07/04/1994    Drug use: Not Currently     Types: Cannabis     Comment: None since 1994   Other Topics Concern    Grew up on a farm No    History of tanning Yes     Comment: Lifeguard, [phone]    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                  Physical Exam     ED Triage Vitals [12/02/24 1145]   /81   Pulse 82   Resp 20   Temp 97 °F (36.1 °C)   Temp src Temporal   SpO2 97 %   O2 Device None (Room air)       Current Vitals:   Vital Signs  BP: 110/64  Pulse: 79  Resp: 19  Temp: 97 °F (36.1 °C)  Temp src: Temporal  MAP (mmHg): 78    Oxygen Therapy  SpO2: 96 %  O2 Device: None (Room air)        Physical Exam  Vitals and nursing note reviewed.   Constitutional:       General: He is not in acute distress.     Appearance: He is well-developed.   HENT:      Head: Normocephalic.      Nose: Nose normal.      Mouth/Throat:      Mouth: Mucous membranes are moist.   Eyes:      Conjunctiva/sclera: Conjunctivae normal.   Cardiovascular:      Rate and Rhythm: Normal rate and regular rhythm.      Heart sounds: No murmur heard.  Pulmonary:      Effort: Pulmonary effort is normal. No respiratory distress.      Breath sounds: Normal breath sounds.   Abdominal:      General: There is no distension.      Palpations: Abdomen is soft.      Tenderness: There is no  abdominal tenderness.   Musculoskeletal:         General: No tenderness. Normal range of motion.      Cervical back: Normal range of motion and neck supple.   Skin:     General: Skin is warm and dry.      Capillary Refill: Capillary refill takes less than 2 seconds.      Findings: No rash.   Neurological:      General: No focal deficit present.      Mental Status: He is alert and oriented to person, place, and time.      Cranial Nerves: No cranial nerve deficit.      Sensory: No sensory deficit.      Motor: No weakness.      Coordination: Coordination normal.             ED Course     Labs Reviewed   CBC WITH DIFFERENTIAL WITH PLATELET - Abnormal; Notable for the following components:       Result Value    WBC 14.8 (*)     RBC 3.72 (*)     HGB 10.8 (*)     HCT 32.4 (*)     Neutrophil Absolute Prelim 11.43 (*)     Neutrophil Absolute 11.43 (*)     Lymphocyte Absolute 0.85 (*)     Monocyte Absolute 2.27 (*)     All other components within normal limits   BASIC METABOLIC PANEL (8) - Abnormal; Notable for the following components:    Glucose 124 (*)     BUN/CREA Ratio 9.8 (*)     Calcium, Total 10.6 (*)     All other components within normal limits   URINALYSIS, ROUTINE - Abnormal; Notable for the following components:    Spec Gravity >1.030 (*)     All other components within normal limits   HEPATIC FUNCTION PANEL (7) - Normal   TROPONIN I HIGH SENSITIVITY - Normal   SCAN SLIDE   MD BLOOD SMEAR CONSULT     EKG    Rate, intervals and axes as noted on EKG Report.  Rate: 74 bpm   Rhythm: Sinus Rhythm  Reading: normal EKG                       [facility]              Medical Decision Making  Diagnosis considered for reflux, pneumonia, subdiaphragmatic inflammatory process.    Problems Addressed:  Community acquired pneumonia of right lower lobe of lung: acute illness or injury  Gastroesophageal reflux disease with esophagitis without hemorrhage: acute illness or injury  Hiccups: acute illness or injury    Amount and/or Complexity  of Data Reviewed  Labs: ordered. Decision-making details documented in ED Course.     Details: Elevated white blood cell count, chemistry panel unremarkable  Radiology: ordered and independent interpretation performed. Decision-making details documented in ED Course.     Details: Chest x-ray normal.  CT scan of chest abdomen pelvis shows findings consistent with reflux esophagitis as well as right lower lobe pneumonia both of which can trigger hiccups.  ECG/medicine tests: ordered and independent interpretation performed. Decision-making details documented in ED Course.  Discussion of management or test interpretation with external provider(s): Thorazine given for hiccups with resolution of symptoms.  Will treat with Augmentin and Zithromax for pneumonia and increase omeprazole to 40 mg daily.  Recommend follow-up with primary care physician.    Risk  Prescription drug management.        Disposition and Plan     Clinical Impression:  1. Community acquired pneumonia of right lower lobe of lung    2. Hiccups    3. Gastroesophageal reflux disease with esophagitis without hemorrhage         Disposition:  Discharge  12/2/2024  4:09 pm    Follow-up:  Esteban Michelle MD  [address] [place] [id number]  [phone]    Schedule an appointment as soon as possible for a visit in 2 day(s)      We recommend that you schedule follow up care with a primary care provider within the next three months to obtain basic health screening including reassessment of your blood pressure.      Medications Prescribed:  Discharge Medication List as of 12/2/2024  4:24 PM        START taking these medications    Details   amoxicillin clavulanate 875-125 MG Oral Tab Take 1 tablet by mouth 2 (two) times daily for 10 days., Normal, Disp-20 tablet, R-0      azithromycin (ZITHROMAX Z-SULY) 250 MG Oral Tab 500 mg once followed by 250 mg daily x 4 days, Normal, Disp-6 tablet, R-0      !! Omeprazole 40 MG Oral Capsule Delayed Release Take 1  capsule (40 mg total) by mouth daily., Normal, Disp-30 capsule, R-0       !! - Potential duplicate medications found. Please discuss with provider.              Supplementary Documentation:

## 2024-12-02 NOTE — ED INITIAL ASSESSMENT (HPI)
Patient is here with a cough for the last two weeks.  He states he was seen here a week ago for the same.  He states he has also had ongoing hiccups for the last week with having some episodes of feeling like his throat is going to close during some of the hiccups starting yesterday.

## 2024-12-02 NOTE — ED PROVIDER NOTES
Patient Seen in: [facility]      History   No chief complaint on file.    Stated Complaint: Cough    Subjective:   HPI    69-year-old male with significant medical history including cardiac stent, CAD, diabetes, GERD, hypertension, HLD, on Plavix and aspirin among other medications, here for evaluation of persistent and worsening constant hiccuping for the last 24 hours.  Last night he had 3 episodes where he felt like his throat was closing and he could not breathe.  He has been monitoring his hiccups overnight and recording them, often having only small pauses of time when he has not had them.  Patient reports noticing hiccups for about 1 week.  He was seen here on 11/24/2024, sent home on doxycycline and Tessalon Perles for sinusitis and bronchitis.  He was seen by his primary care provider 2 days later, started on omeprazole for GERD.  These are the only new medications he has had since these hiccups started and has had omeprazole and tessalon in past with no issues.  He denies chest pain or palpitations, abdominal pain vomiting or diarrhea.  He does report that his cough is minimally improved since last week but still persistent and now dry.     Objective:     Past Medical History:    AK (actinic keratosis)    Skin, left helix of ear    Allergic rhinitis    Anemia    Anxiety    Anxiety state    \"mild\"    Asthma (HCC)    Back problem    Blanchard's esophagus determined by endoscopy    BPH (benign prostatic hyperplasia)    Coronary atherosclerosis    Depression    Diabetes (HCC)    Diverticulosis of large intestine    Esophageal reflux    Essential hypertension    Hearing impairment    mild hearing loss    High blood pressure    High cholesterol    History of kidney stones    Hyperlipidemia    Migraines    Obesity    Osteoarthritis    Pneumonia due to organism    Recovering alcoholic (HCC)    Recurrent acute otitis media    Recurrent pneumonia    Twice in 1972 only, 1 bacterial, 1 viral    Scoliosis     Sleep apnea    CPAP    Visual impairment    wears glasses              Past Surgical History:   Procedure Laterality Date    Angioplasty (coronary)  2012, 12/2020, 01/2021    Cath drug eluting stent  2012, 12/2020, 01/2021    x3    Colonoscopy  07/2021    Colonoscopy  07/2021    Cystoscopy,insert ureteral stent  06/2022    Stent removal    Cystoscopy,ureteroscopy,lithotripsy Right 05/2022    Egd N/A 07/20/2021    Procedure: ESOPHAGOGASTRODUODENOSCOPY w/ bxs , COLONOSCOPY w/ polypectomy;  Surgeon: Lucio Souza MD;  Location: [facility]                Social History     Socioeconomic History    Marital status:    Tobacco Use    Smoking status: Never     Passive exposure: Never    Smokeless tobacco: Never   Vaping Use    Vaping status: Never Used   Substance and Sexual Activity    Alcohol use: Not Currently     Comment: None since 07/04/1994    Drug use: Not Currently     Types: Cannabis     Comment: None since 1994   Other Topics Concern    Grew up on a farm No    History of tanning Yes     Comment: , [phone]    Outdoor occupation Yes     Comment: [employer] [phone]    Reaction to local anesthetic No    Pt has a pacemaker No    Pt has a defibrillator No   Social History Narrative    Retired     - wife has MS    Daughter - Dior    Dad - aged 93 yo (6/2020)     Social Drivers of Health      Received from [facility]    [facility]              Review of Systems    Positive for stated complaint: Cough  Other systems are as noted in HPI.  Constitutional and vital signs reviewed.      All other systems reviewed and negative except as noted above.    Physical Exam     ED Triage Vitals [12/02/24 1027]   /79   Pulse 88   Resp 16   Temp 98.2 °F (36.8 °C)   Temp src Oral   SpO2 98 %   O2 Device None (Room air)       Current Vitals:   Vital Signs  BP: 147/79  Pulse: 88  Resp: 16  Temp: 98.2 °F (36.8 °C)  Temp src: Oral    Oxygen Therapy  SpO2: 98 %  O2 Device: None (Room  air)        Physical Exam  Vitals and nursing note reviewed.   Constitutional:       General: He is not in acute distress.     Appearance: Normal appearance. He is not ill-appearing, toxic-appearing or diaphoretic.   HENT:      Head: Normocephalic. No right periorbital erythema or left periorbital erythema.      Jaw: There is normal jaw occlusion.      Right Ear: Tympanic membrane, ear canal and external ear normal.      Left Ear: Tympanic membrane, ear canal and external ear normal.      Nose: Nose normal.      Mouth/Throat:      Lips: Pink.      Mouth: Mucous membranes are moist.      Pharynx: Oropharynx is clear. Uvula midline. Posterior oropharyngeal erythema present. No pharyngeal swelling, oropharyngeal exudate, uvula swelling or postnasal drip.      Tonsils: No tonsillar exudate or tonsillar abscesses.   Eyes:      General:         Right eye: No discharge.         Left eye: No discharge.      Pupils: Pupils are equal, round, and reactive to light.   Cardiovascular:      Rate and Rhythm: Normal rate.      Pulses: Normal pulses.   Pulmonary:      Effort: Pulmonary effort is normal. No tachypnea, prolonged expiration, respiratory distress or retractions.      Breath sounds: Normal breath sounds and air entry. No stridor, decreased air movement or transmitted upper airway sounds. No decreased breath sounds, wheezing, rhonchi or rales.   Chest:      Chest wall: No tenderness.   Musculoskeletal:         General: Normal range of motion.      Cervical back: Normal range of motion and neck supple. No tenderness.   Lymphadenopathy:      Cervical: No cervical adenopathy.   Skin:     General: Skin is warm and dry.   Neurological:      Mental Status: He is alert and oriented to person, place, and time.   Psychiatric:         Mood and Affect: Mood normal.         Behavior: Behavior normal.           ED Course   Labs Reviewed - No data to display  EKG    Rate, intervals and axes as noted on EKG Report.  Rate: 87  Rhythm:  Sinus Rhythm  Reading: Normal sinus rhythm, no STEMI                     MDM     69 yr old male here for persistent and worsening hiccups x 24 hours, starting 1 week ago. Cough, sinus infection diagnosed 11/24 started on doxycycline and tessalon perles, saw pcp 11/26 started on omeprazole. Did not have hiccups then.    ON exam, pt w consistent hiccups during exam. BL TM normal. Pharynx noted with some erythema. No swelling no exudate. Airway is patent from exam. Skin warm and dry. Lungs clear with no stridor, wheezing or crackles.    Differential diagnoses reflecting the complexity of care include but are not limited to cardiac etiology, PE, arrhythmia, pericarditis, mediastinal mas, gastritis, medication reaction.    Comorbidities that add complexity to management include: CAD, HTN, HLD, cardiac stent, on blood thinners, DM, FRANCY  History obtained by an independent source was from: patient  My independent interpretations of studies include: EKG NSR  Shared decision making was done by: patient, myself  Discussions of management was done with: Dr Mendez attending at [place]    Patient is non-toxic and in distress due to hiccups.  Vital signs are stable.   Given the limitations of the immediate care and the likely need for advanced lab testing and/or imaging not available here the patient will be sent to the emergency department for further evaluation and management.  PT stable to [place] for further workup.            Medical Decision Making      Disposition and Plan     Clinical Impression:  1. Intractable hiccups    2. Acute cough         Disposition:  Ic to ed  12/2/2024 11:11 am    Follow-up:  No follow-up provider specified.        Medications Prescribed:  Discharge Medication List as of 12/2/2024 11:20 AM              Supplementary Documentation:                                                            14

## 2024-12-02 NOTE — ED QUICK NOTES
Missing Thorazine medication. Multiple calls to pharmacy made for missing medication. MD aware about delay of medication administration.

## 2024-12-03 DIAGNOSIS — R05.3 CHRONIC COUGH: ICD-10-CM

## 2024-12-03 RX ORDER — FLUTICASONE PROPIONATE 50 MCG
2 SPRAY, SUSPENSION (ML) NASAL DAILY
Qty: 1 EACH | Refills: 1 | Status: SHIPPED | OUTPATIENT
Start: 2024-12-03

## 2024-12-03 NOTE — TELEPHONE ENCOUNTER
Received fax from pharmacy asking for a refill.  A refill request was received for:  Requested Prescriptions     Pending Prescriptions Disp Refills    fluticasone propionate 50 MCG/ACT Nasal Suspension 1 each 1     Si sprays by Each Nare route daily.     Last refill date:  24  Qty: 1, with 1 refill  Dx: Chronic cough  Last office visit: 24  When is follow up due: 25      Future Appointments   Date Time Provider Department Center   2024  2:30 PM Philip Betancourt MD [facility]   2025  9:30 AM Esteban Michelle MD [id number]

## 2024-12-05 ENCOUNTER — OFFICE VISIT (OUTPATIENT)
Dept: FAMILY MEDICINE CLINIC | Facility: CLINIC | Age: 69
End: 2024-12-05
Payer: MEDICARE

## 2024-12-05 VITALS
OXYGEN SATURATION: 98 % | WEIGHT: 214 LBS | SYSTOLIC BLOOD PRESSURE: 130 MMHG | BODY MASS INDEX: 32.43 KG/M2 | HEART RATE: 76 BPM | HEIGHT: 68 IN | TEMPERATURE: 97 F | DIASTOLIC BLOOD PRESSURE: 70 MMHG

## 2024-12-05 DIAGNOSIS — J18.9 PNEUMONIA OF RIGHT LOWER LOBE DUE TO INFECTIOUS ORGANISM: Primary | ICD-10-CM

## 2024-12-05 DIAGNOSIS — R59.1 LYMPHADENOPATHY: ICD-10-CM

## 2024-12-05 DIAGNOSIS — K22.89 ESOPHAGEAL THICKENING: ICD-10-CM

## 2024-12-05 DIAGNOSIS — K57.90 DIVERTICULOSIS: ICD-10-CM

## 2024-12-05 PROCEDURE — G2211 COMPLEX E/M VISIT ADD ON: HCPCS | Performed by: STUDENT IN AN ORGANIZED HEALTH CARE EDUCATION/TRAINING PROGRAM

## 2024-12-05 PROCEDURE — 99214 OFFICE O/P EST MOD 30 MIN: CPT | Performed by: STUDENT IN AN ORGANIZED HEALTH CARE EDUCATION/TRAINING PROGRAM

## 2024-12-05 NOTE — PROGRESS NOTES
Subjective:   Steve Kitchen is a 69 year old male who presents for Urgent Care F/u (cough)     69-year-old male coming in to follow-up after immediate care/ED visit.  Patient went to the immediate care on  12/2/24 due to intractable hiccups and cough and was advised to proceed to the ER.  He had a CT scan of the chest abdomen pelvis that showed findings consistent with reflux esophagitis as well as right lower lobe pneumonia.  Thorazine was given for hiccups with resolution of symptoms.  Patient was also prescribed Augmentin and azithromycin with an increase of omeprazole to 40 mg daily.  Feeling much improved at this time with an improved cough.  Rarely get a cough with some phlegm production.    Denies fever, chills, SOB, difficulty breathing.    History/Other:    Chief Complaint Reviewed and Verified  Nursing Notes Reviewed and   Verified  Tobacco Reviewed  Allergies Reviewed  Medications Reviewed    Problem List Reviewed  Medical History Reviewed  Surgical History   Reviewed  Family History Reviewed  Social History Reviewed         Tobacco:  He has never smoked tobacco.    Current Outpatient Medications   Medication Sig Dispense Refill    fluticasone propionate 50 MCG/ACT Nasal Suspension 2 sprays by Each Nare route daily. 1 each 1    amoxicillin clavulanate 875-125 MG Oral Tab Take 1 tablet by mouth 2 (two) times daily for 10 days. 20 tablet 0    azithromycin (ZITHROMAX Z-SULY) 250 MG Oral Tab 500 mg once followed by 250 mg daily x 4 days 6 tablet 0    Omeprazole 40 MG Oral Capsule Delayed Release Take 1 capsule (40 mg total) by mouth daily. 30 capsule 0    omeprazole 20 MG Oral Capsule Delayed Release Take 1 capsule (20 mg total) by mouth every morning for 14 days. 30 capsule 0    benzonatate 200 MG Oral Cap Take 1 capsule (200 mg total) by mouth 3 (three) times daily as needed for cough. (Patient not taking: Reported on 12/2/2024) 20 capsule 0    clopidogrel 75 MG Oral Tab Take 1 tablet (75  mg total) by mouth daily.      Coenzyme Q10 100 MG Oral Cap Take 100 mg by mouth daily.      SODIUM FLUORIDE 5000 PPM 1.1 % Dental Paste USE AS TOOTHPASTE 1-2 TIMES DAILY. DO NOT DRINK OR EAT FOR 30 MINUTES AFTER BRUSHING. DO NOT SWALLOW      semaglutide (OZEMPIC, 1 MG/DOSE,) 4 MG/3ML Subcutaneous Solution Pen-injector Inject 1 mg into the skin once a week. 3 each 1    metoprolol succinate ER 25 MG Oral Tablet 24 Hr Take 1 tablet (25 mg total) by mouth daily. 90 tablet 1    albuterol (PROAIR HFA) 108 (90 Base) MCG/ACT Inhalation Aero Soln Inhale 2 puffs into the lungs every 4 (four) hours as needed for Wheezing. 18 g 2    FENOFIBRATE 145 MG Oral Tab TAKE 1 TABLET BY MOUTH EVERY DAY 90 tablet 1    GLIPIZIDE ER 5 MG Oral Tablet 24 Hr TAKE 1 TABLET (5 MG TOTAL) BY MOUTH DAILY. 90 tablet 1    BUPROPION HCL ER, SR, 200 MG Oral Tablet 12 Hr TAKE 1 TABLET BY MOUTH TWICE A  tablet 1    AMLODIPINE BESY-BENAZEPRIL HCL 10-40 MG Oral Cap TAKE 1 CAPSULE BY MOUTH EVERY DAY 90 capsule 1    METFORMIN HCL 1000 MG Oral Tab TAKE 1 TABLET BY MOUTH TWICE A DAY WITH MEALS 180 tablet 1    ROSUVASTATIN 20 MG Oral Tab TAKE 1 TABLET BY MOUTH EVERY DAY AT NIGHT 90 tablet 1    hydroCHLOROthiazide 25 MG Oral Tab Take 1 tablet (25 mg total) by mouth daily. 90 tablet 3    Misc Natural Products (GLUCOSAMINE CHOND CMP ADVANCED) Oral Tab Take by mouth at bedtime.      Multiple Vitamins-Minerals (CENTRUM SILVER 50+MEN) Oral Tab Take by mouth at bedtime.      B Complex Vitamins (VITAMIN B COMPLEX) Oral Tab Take by mouth every morning.      Ascorbic Acid (VITAMIN C) 1000 MG Oral Tab Take 1 tablet (1,000 mg total) by mouth daily.      ferrous sulfate 325 (65 FE) MG Oral Tab EC Take 1 tablet (325 mg total) by mouth at bedtime.      cholecalciferol 50 MCG (2000 UT) Oral Tab Take 1 tablet (2,000 Units total) by mouth at bedtime.      aspirin 81 MG Oral Tab Take 1 tablet (81 mg total) by mouth daily.           Review of Systems:  Review of Systems    Constitutional:  Negative for chills, diaphoresis and fever.   HENT:  Negative for congestion, ear discharge, ear pain, sinus pressure, sinus pain and sore throat.    Eyes:  Negative for pain and discharge.   Respiratory:  Positive for cough (improved). Negative for chest tightness, shortness of breath and wheezing.    Cardiovascular:  Negative for chest pain and palpitations.   Gastrointestinal:  Negative for abdominal pain, diarrhea, nausea and vomiting.   Endocrine: Negative for cold intolerance and heat intolerance.   Genitourinary:  Negative for dysuria, flank pain, frequency and urgency.   Musculoskeletal:  Negative for joint swelling.   Skin:  Negative for rash.   Neurological:  Negative for dizziness, syncope and headaches.   Psychiatric/Behavioral:  Negative for confusion and hallucinations.        Objective:   /70   Pulse 76   Temp 97.2 °F (36.2 °C)   Ht 5' 8\" (1.727 m)   Wt 214 lb (97.1 kg)   SpO2 98%   BMI 32.54 kg/m²  Estimated body mass index is 32.54 kg/m² as calculated from the following:    Height as of this encounter: 5' 8\" (1.727 m).    Weight as of this encounter: 214 lb (97.1 kg).  Physical Exam  Constitutional:       General: He is not in acute distress.     Appearance: Normal appearance. He is not ill-appearing or toxic-appearing.   HENT:      Head: Normocephalic and atraumatic.   Cardiovascular:      Rate and Rhythm: Normal rate and regular rhythm.      Heart sounds: Normal heart sounds. No murmur heard.     No gallop.   Pulmonary:      Effort: Pulmonary effort is normal. No respiratory distress.      Breath sounds: Normal breath sounds. No stridor. No wheezing, rhonchi or rales.   Abdominal:      General: Bowel sounds are normal.      Palpations: Abdomen is soft.      Tenderness: There is no abdominal tenderness. There is no guarding.   Musculoskeletal:      Cervical back: Normal range of motion and neck supple.   Skin:     General: Skin is warm and dry.   Neurological:       General: No focal deficit present.      Mental Status: He is alert and oriented to person, place, and time. Mental status is at baseline.   Psychiatric:         Mood and Affect: Mood normal.         Behavior: Behavior normal.         Thought Content: Thought content normal.         Judgment: Judgment normal.       CT ABDOMEN+PELVIS(CONTRAST ONLY)(CPT=74177)    Result Date: 12/2/2024  CONCLUSION:   1. No bowel obstruction, acute appendicitis, or acute diverticulitis. 2. Mild distal esophageal wall thickening, which may reflect esophagitis or gastroesophageal reflux disease. 3. No hydronephrosis or urinary calculus. 4. Hepatomegaly and hepatic steatosis. 5. Lesser incidental findings described above.     Dictated by (CST): Gorge Marroquin MD on 12/02/2024 at 3:04 PM     Finalized by (CST): Gorge Marroquin MD on 12/02/2024 at 3:10 PM          CT CHEST PE AORTA (IV ONLY) (CPT=71260)    Result Date: 12/2/2024  CONCLUSION:   1. No acute pulmonary embolism through the segmental pulmonary arteries. 2. Right lower lobe pneumonia.  Recommend follow-up CT chest in 8 -12 weeks to monitor for resolution. 3. Multiple additional small ground-glass opacities involving both lungs, which may reflect multifocal pneumonia and/or bronchiolitis.  4. Small airways disease. 5. Mediastinal and hilar lymphadenopathy, which is favored to be reactive but recommend continued attention on follow-up imaging. 6. Triple-vessel coronary artery calcifications. 7. Lesser incidental findings described above.     Dictated by (CST): Gorge Marroquin MD on 12/02/2024 at 2:55 PM     Finalized by (CST): Gorge Marroquin MD on 12/02/2024 at 3:04 PM          XR CHEST AP PORTABLE  (CPT=71045)    Result Date: 12/2/2024  CONCLUSION: Normal examination.     Dictated by (CST): Henrique Shultz MD on 12/02/2024 at 1:56 PM     Finalized by (CST): Henrique Shultz MD on 12/02/2024 at 1:57 PM          XR CHEST PA + LAT CHEST (CPT=71046)    Result Date:  11/24/2024  CONCLUSION: No acute cardiopulmonary abnormality.    Dictated by (CST): uRss Hoffman MD on 11/24/2024 at 11:54 AM     Finalized by (CST): Russ Hoffman MD on 11/24/2024 at 11:55 AM             Assessment & Plan:   1. Pneumonia of right lower lobe due to infectious organism (Primary)  -Continue with Augmentin and Zithromax  -Repeat CT scan in 12 weeks from last  -     CT CHEST(CONTRAST ONLY) (CPT=71260); Future; Expected date: 12/05/2024  2. Lymphadenopathy  -Repeat CT scan in 12 weeks from last  -     CT CHEST(CONTRAST ONLY) (CPT=71260); Future; Expected date: 12/05/2024  3. Diverticulosis  -Fiber rich diet   4. Esophageal thickening  -Continue with omeprazole  -Continue follow-up with GI      Return in about 12 weeks (around 2/27/2025).    Esteban Michelle MD, 12/5/2024, 10:11 AM

## 2024-12-09 ENCOUNTER — OFFICE VISIT (OUTPATIENT)
Dept: SURGERY | Facility: CLINIC | Age: 69
End: 2024-12-09

## 2024-12-09 DIAGNOSIS — N20.0 KIDNEY STONE: Primary | ICD-10-CM

## 2024-12-09 PROCEDURE — 99214 OFFICE O/P EST MOD 30 MIN: CPT | Performed by: UROLOGY

## 2024-12-09 NOTE — PROGRESS NOTES
Steve Kitchen is a 69 year old male.    HPI:     Chief Complaint   Patient presents with    Follow - Up     6 month f/u, recent CT of abdomen and pelvis done on 12/02/2024       69-year-old male with a history of kidney stones, history of hypercalciuria for which he remains on hydrochlorothiazide 12.5 mg daily last seen in the office June 11, 2024.  Feels well.  No flank pain.  No recurrent renal colic since he was last seen.  No bothersome lower urinary tract symptoms.  Has an IPSS score 4 quality-of-life index of 2.  He was in the emergency department December 2, 2024 for intractable hiccups.  Diagnosed with pneumonia and treated with antibiotics.  CT abdomen pelvis demonstrates no evidence of kidney stones or hydronephrosis.  Urinalysis same visit was unremarkable.      HISTORY:  Past Medical History:    AK (actinic keratosis)    Skin, left helix of ear    Allergic rhinitis    Anemia    Anxiety    Anxiety state    \"mild\"    Asthma (HCC)    Back problem    Blanchard's esophagus determined by endoscopy    BPH (benign prostatic hyperplasia)    Coronary atherosclerosis    Depression    Diabetes (HCC)    Diverticulosis of large intestine    Esophageal reflux    Essential hypertension    Hearing impairment    mild hearing loss    High blood pressure    High cholesterol    History of kidney stones    Hyperlipidemia    Migraines    Obesity    Osteoarthritis    Pneumonia due to organism    Recovering alcoholic (HCC)    Recurrent acute otitis media    Recurrent pneumonia    Twice in 1972 only, 1 bacterial, 1 viral    Scoliosis    Sleep apnea    CPAP    Visual impairment    wears glasses      Past Surgical History:   Procedure Laterality Date    Angioplasty (coronary)  2012, 12/2020, 01/2021    Cath drug eluting stent  2012, 12/2020, 01/2021    x3    Colonoscopy  07/2021    Colonoscopy  07/2021    Cystoscopy,insert ureteral stent  06/2022    Stent removal    Cystoscopy,ureteroscopy,lithotripsy Right 05/2022    Egd N/A  07/20/2021    Procedure: ESOPHAGOGASTRODUODENOSCOPY w/ bxs , COLONOSCOPY w/ polypectomy;  Surgeon: Lucio Souza MD;  [facility]      Family History   Problem Relation Age of Onset    Heart Disorder Father         Cardiac stent    Hypertension Father     Cancer Father         Severl skin lesions removed    Other (Other) Father         prostate issues    Hypertension Mother     Arthritis Mother     Other (Blanchard's Esophagus) Mother     Cancer Mother         Several skin lesions removed    Arthritis Sister     Thyroid Disorder Sister     Arthritis Brother     Arthritis Sister     Depression Sister     Arthritis Brother     Depression Brother     Asthma Daughter     Cancer Maternal Grandfather         Several skin lesions removed    Dementia Maternal Grandfather     Diabetes Maternal Grandfather         Type II, non-insulin dependant    Cancer Maternal Grandmother         Colon cancer, colostomy, eventual cause of death      Social History:   Social History     Socioeconomic History    Marital status:    Tobacco Use    Smoking status: Never     Passive exposure: Never    Smokeless tobacco: Never   Vaping Use    Vaping status: Never Used   Substance and Sexual Activity    Alcohol use: Not Currently     Comment: None since 07/04/1994    Drug use: Not Currently     Types: Cannabis     Comment: None since 1994   Other Topics Concern    Grew up on a farm No    History of tanning Yes     Comment: , [id number]    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 1 each 1    amoxicillin clavulanate 875-125 MG Oral Tab Take 1 tablet by mouth 2 (two) times daily for 10 days. 20 tablet 0    Omeprazole 40 MG Oral Capsule Delayed Release Take 1 capsule (40 mg total) by mouth daily. 30 capsule 0    omeprazole 20 MG Oral Capsule Delayed Release Take 1 capsule (20 mg total) by mouth every morning for 14 days. 30 capsule 0    benzonatate 200 MG Oral Cap Take 1 capsule (200 mg total) by mouth 3 (three) times daily as needed for cough. (Patient not taking: Reported on 12/2/2024) 20 capsule 0    clopidogrel 75 MG Oral Tab Take 1 tablet (75 mg total) by mouth daily.      Coenzyme Q10 100 MG Oral Cap Take 100 mg by mouth daily.      SODIUM FLUORIDE 5000 PPM 1.1 % Dental Paste USE AS TOOTHPASTE 1-2 TIMES DAILY. DO NOT DRINK OR EAT FOR 30 MINUTES AFTER BRUSHING. DO NOT SWALLOW      semaglutide (OZEMPIC, 1 MG/DOSE,) 4 MG/3ML Subcutaneous Solution Pen-injector Inject 1 mg into the skin once a week. 3 each 1    metoprolol succinate ER 25 MG Oral Tablet 24 Hr Take 1 tablet (25 mg total) by mouth daily. 90 tablet 1    albuterol (PROAIR HFA) 108 (90 Base) MCG/ACT Inhalation Aero Soln Inhale 2 puffs into the lungs every 4 (four) hours as needed for Wheezing. 18 g 2    FENOFIBRATE 145 MG Oral Tab TAKE 1 TABLET BY MOUTH EVERY DAY 90 tablet 1    GLIPIZIDE ER 5 MG Oral Tablet 24 Hr TAKE 1 TABLET (5 MG TOTAL) BY MOUTH DAILY. 90 tablet 1    BUPROPION HCL ER, SR, 200 MG Oral Tablet 12 Hr TAKE 1 TABLET BY MOUTH TWICE A  tablet 1    AMLODIPINE BESY-BENAZEPRIL HCL 10-40 MG Oral Cap TAKE 1 CAPSULE BY MOUTH EVERY DAY 90 capsule 1    METFORMIN HCL 1000 MG Oral Tab TAKE 1 TABLET BY MOUTH TWICE A DAY WITH MEALS 180 tablet 1    ROSUVASTATIN 20 MG Oral Tab TAKE 1 TABLET BY MOUTH EVERY DAY AT NIGHT 90 tablet 1    hydroCHLOROthiazide 25 MG Oral Tab Take 1 tablet (25 mg total) by mouth daily. 90 tablet 3    Misc Natural Products (GLUCOSAMINE CHOND  CMP ADVANCED) Oral Tab Take by mouth at bedtime.      Multiple Vitamins-Minerals (CENTRUM SILVER 50+MEN) Oral Tab Take by mouth at bedtime.      B Complex Vitamins (VITAMIN B COMPLEX) Oral Tab Take by mouth every morning.      Ascorbic Acid (VITAMIN C) 1000 MG Oral Tab Take 1 tablet (1,000 mg total) by mouth daily.      ferrous sulfate 325 (65 FE) MG Oral Tab EC Take 1 tablet (325 mg total) by mouth at bedtime.      cholecalciferol 50 MCG (2000 UT) Oral Tab Take 1 tablet (2,000 Units total) by mouth at bedtime.      aspirin 81 MG Oral Tab Take 1 tablet (81 mg total) by mouth daily.         Allergies:  Allergies[1]      ROS:       PHYSICAL EXAM:   Digital prostate exam demonstrates a normal sphincter tone, prostate approximately 50 g smooth symmetric without masses or nodules.     ASSESSMENT/PLAN:   Assessment   Encounter Diagnosis   Name Primary?    Kidney stone Yes       Recommend:  - Continue on hydrochlorothiazide 12.5 mg daily.  - Follow-up otherwise in 1 year.    I spent a total of 25 minutes with patient more than half time face discussion.         Orders This Visit:  No orders of the defined types were placed in this encounter.      Meds This Visit:  Requested Prescriptions      No prescriptions requested or ordered in this encounter       Imaging & Referrals:  None     12/9/2024  Philip Betancourt MD               [1]   Allergies  Allergen Reactions    Dander ASTHMA and Coughing    Mixed Grasses ASTHMA and Coughing    Molds & Smuts ASTHMA, Coughing and SHORTNESS OF BREATH     Other Reaction(s): ASTHMA    Tree, Elm ASTHMA and Coughing    Octacosanol Coughing     Itchy eyes    Other Coughing     Itchy eyes    Seasonal Coughing     Itchy eyes

## 2025-02-10 DIAGNOSIS — R05.3 CHRONIC COUGH: ICD-10-CM

## 2025-02-10 NOTE — TELEPHONE ENCOUNTER
A refill request was received for:  Requested Prescriptions     Pending Prescriptions Disp Refills    fluticasone propionate 50 MCG/ACT Nasal Suspension 1 each 1     Si sprays by Each Nare route daily.     Last refill date:  12/3/2024  Qty: 1 - 1 Refill  Dx: Cough  Last office visit: 2024  When is follow up due: 2025      Future Appointments   Date Time Provider Department Center   2025  9:30 AM Esteban Michelle MD [id number] [facility] [place]   2025 11:00 AM Philip Betancourt MD [facility]

## 2025-02-11 RX ORDER — FLUTICASONE PROPIONATE 50 MCG
2 SPRAY, SUSPENSION (ML) NASAL DAILY
Qty: 1 EACH | Refills: 1 | Status: SHIPPED | OUTPATIENT
Start: 2025-02-11

## 2025-02-15 DIAGNOSIS — R05.3 CHRONIC COUGH: ICD-10-CM

## 2025-02-17 RX ORDER — ALBUTEROL SULFATE 90 UG/1
2 INHALANT RESPIRATORY (INHALATION) EVERY 4 HOURS PRN
Qty: 18 EACH | Refills: 1 | Status: SHIPPED | OUTPATIENT
Start: 2025-02-17

## 2025-02-17 NOTE — TELEPHONE ENCOUNTER
A refill request was received for:  Requested Prescriptions     Pending Prescriptions Disp Refills    ALBUTEROL 108 (90 Base) MCG/ACT Inhalation Aero Soln [Pharmacy Med Name: ALBUTEROL HFA (VENTOLIN) INH] 18 each 2     Sig: INHALE 2 PUFFS INTO THE LUNGS EVERY 4 HOURS AS NEEDED FOR WHEEZE     Last refill date:  11/5/24   Qty: 18 g   Dx: chronic cough  Last office visit: 12/5/24   When is follow up due: 2/27/25      Future Appointments   Date Time Provider Department Center   4/25/2025  9:30 AM Esteban Michelle MD [id number] [facility] [place]   12/1/2025 11:00 AM [facility]

## 2025-04-15 DIAGNOSIS — E78.5 HYPERLIPIDEMIA, UNSPECIFIED HYPERLIPIDEMIA TYPE: ICD-10-CM

## 2025-04-15 DIAGNOSIS — I10 ESSENTIAL HYPERTENSION: ICD-10-CM

## 2025-04-15 DIAGNOSIS — F41.9 ANXIETY: ICD-10-CM

## 2025-04-15 DIAGNOSIS — F32.A DEPRESSION, UNSPECIFIED DEPRESSION TYPE: ICD-10-CM

## 2025-04-15 DIAGNOSIS — E11.9 TYPE 2 DIABETES MELLITUS WITHOUT COMPLICATION, WITHOUT LONG-TERM CURRENT USE OF INSULIN (HCC): ICD-10-CM

## 2025-04-16 RX ORDER — FENOFIBRATE 145 MG/1
145 TABLET, FILM COATED ORAL DAILY
Qty: 90 TABLET | Refills: 1 | Status: SHIPPED | OUTPATIENT
Start: 2025-04-16

## 2025-04-16 RX ORDER — AMLODIPINE AND BENAZEPRIL HYDROCHLORIDE 10; 40 MG/1; MG/1
1 CAPSULE ORAL DAILY
Qty: 90 CAPSULE | Refills: 1 | Status: SHIPPED | OUTPATIENT
Start: 2025-04-16

## 2025-04-16 RX ORDER — GLIPIZIDE 5 MG/1
5 TABLET, FILM COATED, EXTENDED RELEASE ORAL DAILY
Qty: 90 TABLET | Refills: 1 | Status: SHIPPED | OUTPATIENT
Start: 2025-04-16

## 2025-04-16 RX ORDER — FAMOTIDINE 20 MG/1
20 TABLET, FILM COATED ORAL 2 TIMES DAILY
Qty: 180 TABLET | Refills: 0 | Status: SHIPPED | OUTPATIENT
Start: 2025-04-16

## 2025-04-16 RX ORDER — BUPROPION HYDROCHLORIDE 200 MG/1
200 TABLET, EXTENDED RELEASE ORAL 2 TIMES DAILY
Qty: 180 TABLET | Refills: 1 | Status: SHIPPED | OUTPATIENT
Start: 2025-04-16

## 2025-04-16 NOTE — TELEPHONE ENCOUNTER
A refill request was received for:  Requested Prescriptions     Pending Prescriptions Disp Refills    FAMOTIDINE 20 MG Oral Tab [Pharmacy Med Name: FAMOTIDINE 20 MG TABLET] 180 tablet 0     Sig: TAKE 1 TABLET BY MOUTH TWICE A DAY     Signed Prescriptions Disp Refills    AMLODIPINE BESY-BENAZEPRIL HCL 10-40 MG Oral Cap 90 capsule 1     Sig: TAKE 1 CAPSULE BY MOUTH EVERY DAY     Authorizing Provider: DANE JENKINS     Ordering User: JEFRY BELLAMY    BUPROPION HCL ER, SR, 200 MG Oral Tablet 12 Hr 180 tablet 1     Sig: TAKE 1 TABLET BY MOUTH TWICE A DAY     Authorizing Provider: DANE JENKINS     Ordering User: JEFRY BELLAMY    FENOFIBRATE 145 MG Oral Tab 90 tablet 1     Sig: TAKE 1 TABLET BY MOUTH EVERY DAY     Authorizing Provider: DANE JENKINS     Ordering User: JEFRY BELLAMY    METFORMIN HCL 1000 MG Oral Tab 180 tablet 1     Sig: TAKE 1 TABLET BY MOUTH TWICE A DAY WITH MEALS     Authorizing Provider: DANE JENKINS     Ordering User: JEFRY BELLAMY    GLIPIZIDE ER 5 MG Oral Tablet 24 Hr 90 tablet 1     Sig: TAKE 1 TABLET (5 MG TOTAL) BY MOUTH DAILY.     Authorizing Provider: DANE JENKINS     Ordering User: JEFRY BELLAMY     Last refill date:  10/15/2024  Qty: 180 tablets and 1  Dx: GERD  Last office visit: 11/5/2024  When is follow up due: 5/5/2025      Future Appointments   Date Time Provider Department Center   4/25/2025  9:30 AM Dane Jenkins MD [id number] [facility] [place]   12/1/2025 11:00 AM Philip Betancourt MD [facility]

## 2025-05-19 DIAGNOSIS — E78.5 HYPERLIPIDEMIA, UNSPECIFIED HYPERLIPIDEMIA TYPE: ICD-10-CM

## 2025-05-19 NOTE — TELEPHONE ENCOUNTER
A refill request was received for:  Requested Prescriptions     Pending Prescriptions Disp Refills    rosuvastatin 20 MG Oral Tab 90 tablet 1     Sig: Take 1 tablet (20 mg total) by mouth nightly.     Last refill date:  10/15/24  Qty: 90 - 1 refill  Dx: hyperlipidemia  Last office visit: 11/5/2024  When is follow up due: DUE      Future Appointments   Date Time Provider Department Center   12/1/2025 11:00 AM Philip Betancourt MD [facility]

## 2025-05-23 RX ORDER — ROSUVASTATIN CALCIUM 20 MG/1
20 TABLET, COATED ORAL NIGHTLY
Qty: 90 TABLET | Refills: 0 | Status: SHIPPED | OUTPATIENT
Start: 2025-05-23

## 2025-05-27 ENCOUNTER — OFFICE VISIT (OUTPATIENT)
Dept: FAMILY MEDICINE CLINIC | Facility: CLINIC | Age: 70
End: 2025-05-27
Payer: MEDICARE

## 2025-05-27 VITALS
HEART RATE: 75 BPM | HEIGHT: 68 IN | SYSTOLIC BLOOD PRESSURE: 110 MMHG | TEMPERATURE: 98 F | WEIGHT: 211.19 LBS | OXYGEN SATURATION: 99 % | RESPIRATION RATE: 16 BRPM | BODY MASS INDEX: 32.01 KG/M2 | DIASTOLIC BLOOD PRESSURE: 62 MMHG

## 2025-05-27 DIAGNOSIS — I10 ESSENTIAL HYPERTENSION: ICD-10-CM

## 2025-05-27 DIAGNOSIS — E66.811 OBESITY, CLASS I, BMI 30-34.9: ICD-10-CM

## 2025-05-27 DIAGNOSIS — G47.33 OSA ON CPAP: ICD-10-CM

## 2025-05-27 DIAGNOSIS — K57.90 DIVERTICULOSIS: ICD-10-CM

## 2025-05-27 DIAGNOSIS — Z00.00 ENCOUNTER FOR ANNUAL HEALTH EXAMINATION: Primary | ICD-10-CM

## 2025-05-27 DIAGNOSIS — K22.70 BARRETT'S ESOPHAGUS WITHOUT DYSPLASIA: ICD-10-CM

## 2025-05-27 DIAGNOSIS — Z95.5 STATUS POST CORONARY ARTERY STENT PLACEMENT: ICD-10-CM

## 2025-05-27 DIAGNOSIS — F32.A DEPRESSION, UNSPECIFIED DEPRESSION TYPE: ICD-10-CM

## 2025-05-27 DIAGNOSIS — E78.5 DYSLIPIDEMIA: ICD-10-CM

## 2025-05-27 DIAGNOSIS — K21.9 GASTROESOPHAGEAL REFLUX DISEASE WITHOUT ESOPHAGITIS: ICD-10-CM

## 2025-05-27 DIAGNOSIS — E11.41 TYPE 2 DIABETES MELLITUS WITH DIABETIC MONONEUROPATHY, WITHOUT LONG-TERM CURRENT USE OF INSULIN (HCC): ICD-10-CM

## 2025-05-27 DIAGNOSIS — F10.11 NONDEPENDENT ALCOHOL ABUSE, IN REMISSION: ICD-10-CM

## 2025-05-27 DIAGNOSIS — Z71.85 IMMUNIZATION COUNSELING: ICD-10-CM

## 2025-05-27 DIAGNOSIS — F41.9 ANXIETY: ICD-10-CM

## 2025-05-27 DIAGNOSIS — K22.89 ESOPHAGEAL THICKENING: ICD-10-CM

## 2025-05-27 DIAGNOSIS — Z87.442 HISTORY OF KIDNEY STONES: ICD-10-CM

## 2025-05-27 DIAGNOSIS — D12.6 TUBULAR ADENOMA OF COLON: ICD-10-CM

## 2025-05-27 DIAGNOSIS — I25.10 CORONARY ARTERY DISEASE INVOLVING NATIVE CORONARY ARTERY OF NATIVE HEART WITHOUT ANGINA PECTORIS: ICD-10-CM

## 2025-05-27 LAB
CREAT UR-SCNC: 32 MG/DL
MICROALBUMIN UR-MCNC: <0.3 MG/DL

## 2025-05-27 PROCEDURE — 82570 ASSAY OF URINE CREATININE: CPT | Performed by: STUDENT IN AN ORGANIZED HEALTH CARE EDUCATION/TRAINING PROGRAM

## 2025-05-27 PROCEDURE — G0439 PPPS, SUBSEQ VISIT: HCPCS | Performed by: STUDENT IN AN ORGANIZED HEALTH CARE EDUCATION/TRAINING PROGRAM

## 2025-05-27 PROCEDURE — 99499 UNLISTED E&M SERVICE: CPT | Performed by: STUDENT IN AN ORGANIZED HEALTH CARE EDUCATION/TRAINING PROGRAM

## 2025-05-27 PROCEDURE — 82043 UR ALBUMIN QUANTITATIVE: CPT | Performed by: STUDENT IN AN ORGANIZED HEALTH CARE EDUCATION/TRAINING PROGRAM

## 2025-05-27 RX ORDER — AMLODIPINE AND BENAZEPRIL HYDROCHLORIDE 10; 40 MG/1; MG/1
1 CAPSULE ORAL DAILY
Qty: 90 CAPSULE | Refills: 1 | Status: SHIPPED | OUTPATIENT
Start: 2025-05-27

## 2025-05-27 RX ORDER — METOPROLOL SUCCINATE 25 MG/1
25 TABLET, EXTENDED RELEASE ORAL DAILY
Qty: 90 TABLET | Refills: 1 | Status: SHIPPED | OUTPATIENT
Start: 2025-05-27

## 2025-05-27 RX ORDER — BUPROPION HYDROCHLORIDE 200 MG/1
200 TABLET, EXTENDED RELEASE ORAL 2 TIMES DAILY
Qty: 180 TABLET | Refills: 1 | Status: SHIPPED | OUTPATIENT
Start: 2025-05-27

## 2025-05-27 RX ORDER — GLIPIZIDE 5 MG/1
5 TABLET, FILM COATED, EXTENDED RELEASE ORAL DAILY
Qty: 90 TABLET | Refills: 1 | Status: SHIPPED | OUTPATIENT
Start: 2025-05-27

## 2025-05-27 RX ORDER — SEMAGLUTIDE 1.34 MG/ML
1 INJECTION, SOLUTION SUBCUTANEOUS WEEKLY
Qty: 3 EACH | Refills: 1 | Status: SHIPPED | OUTPATIENT
Start: 2025-05-27

## 2025-05-27 RX ORDER — FAMOTIDINE 20 MG/1
20 TABLET, FILM COATED ORAL 2 TIMES DAILY
Qty: 180 TABLET | Refills: 1 | Status: SHIPPED | OUTPATIENT
Start: 2025-05-27

## 2025-05-27 RX ORDER — ROSUVASTATIN CALCIUM 20 MG/1
20 TABLET, COATED ORAL NIGHTLY
Qty: 90 TABLET | Refills: 1 | Status: SHIPPED | OUTPATIENT
Start: 2025-05-27

## 2025-05-27 RX ORDER — FENOFIBRATE 145 MG/1
145 TABLET, FILM COATED ORAL DAILY
Qty: 90 TABLET | Refills: 1 | Status: SHIPPED | OUTPATIENT
Start: 2025-05-27

## 2025-05-27 NOTE — PROGRESS NOTES
Subjective:   Steve Kitchen is a 70 year old male who presents for a Subsequent Annual Wellness visit (Pt already had Initial Annual Wellness) and scheduled follow up of multiple significant but stable problems.             70-year-old male coming in for his routine Medicare physical.  History of DM that is well-controlled on medications.  Minimal neuropathy at medial big toes.  History of CAD status post stents.  Currently on aspirin and Plavix.  On 07/02/2024 angiography showed 90% fibrotic, focal, ISR in OM branch of CX. Underwent successful, complex PCI with intra arterial lithotripsy and MANPREET placement.    Continues to follow-up with his cardiologist.  Depression is stable, no SHIP.  No longer seeing psychology.  FRANCY on CPAP.  History of alcohol use disorder in remission over the past 30 years.  He had an endoscopy/colonoscopy in July 2021.  Biopsy of the esophagus revealed a very tiny area of Blanchard's epithelium.  Advised to repeat endoscopy with biopsy in 5 years.  Colonoscopy revealed an adenoma.  Was advised to repeat in 7 years.     History/Other:   Fall Risk Assessment:   He has been screened for Falls and is High Risk. Fall Prevention information provided to patient in After Visit Summary.    Do you feel unsteady when standing or walking?: (Patient-Rptd) No  Do you worry about falling?: (Patient-Rptd) Yes  Have you fallen in the past year?: (Patient-Rptd) Yes  How many times have you fallen?: (Patient-Rptd) (P) 11  Were you injured?: (Patient-Rptd) (P) No     Cognitive Assessment:   He had a completely normal cognitive assessment - see flowsheet entries       Functional Ability/Status:   Steve Kitchen has some abnormal functions as listed below:  He has Hearing problems based on screening of functional status.      Depression Screening (PHQ):  PHQ-2 SCORE: 0  , done 5/27/2025            Advanced Directives:   He does have a Living Will but we do NOT have it on file in Epic.    He does  have a POA but we do NOT have it on file in Epic.    Discussed Advance Care Planning with patient (and family/surrogate if present). Standard forms made available to patient in After Visit Summary.      Problem List[1]  Allergies:  He is allergic to dander; mixed grasses; molds & smuts; tree, elm; other; and seasonal.    Current Medications:  Active Meds, Sig Only[2]    Medical History:  He  has a past medical history of AK (actinic keratosis) (05/29/2024), Allergic rhinitis (1972), Anemia, Anxiety (2012), Anxiety state, Asthma (HCC), Back problem, Blanchard's esophagus determined by endoscopy (07/2021), BPH (benign prostatic hyperplasia), Coronary atherosclerosis, Depression, Diabetes (HCC), Diverticulosis of large intestine, Esophageal reflux, Essential hypertension (2005), Hearing impairment, High blood pressure, High cholesterol, History of kidney stones (02/04/2022), Hyperlipidemia, Migraines, Obesity (1993), Osteoarthritis, Pneumonia due to organism, Recovering alcoholic (HCC), Recurrent acute otitis media (1960's), Recurrent pneumonia (1972), Scoliosis (2022), Sleep apnea, and Visual impairment.  Surgical History:  He  has a past surgical history that includes colonoscopy (07/2021); egd (N/A, 07/20/2021); cystoscopy,ureteroscopy,lithotripsy (Right, 05/2022); cystoscopy,insert ureteral stent (06/2022); cath drug eluting stent (2012, 12/2020, 01/2021); angioplasty (coronary) (2012, 12/2020, 01/2021); and colonoscopy (07/2021).   Family History:  His family history includes Arthritis in his brother, brother, mother, sister, and sister; Asthma in his daughter; Blanchard's Esophagus in his mother; Cancer in his father, maternal grandfather, maternal grandmother, and mother; Dementia in his maternal grandfather; Depression in his brother and sister; Diabetes in his maternal grandfather; Heart Disorder in his father; Hypertension in his father and mother; Other in his father; Thyroid Disorder in his sister.  Social  History:  He  reports that he has never smoked. He has never been exposed to tobacco smoke. He has never used smokeless tobacco. He reports that he does not currently use alcohol. He reports that he does not currently use drugs after having used the following drugs: Cannabis.    Tobacco:  He has never smoked tobacco.    CAGE Alcohol Screen:   CAGE screening score of 0 on 5/26/2025, showing low risk of alcohol abuse.      Patient Care Team:  Esteban Michelle MD as PCP - General  Dahiana Ojeda MD as Psychiatrist/APN (Psychiatry)  Michelle Farris LCPC as Clinical Therapist    Review of Systems   Constitutional:  Negative for chills, diaphoresis and fever.   HENT:  Negative for congestion, ear discharge, ear pain, sinus pressure, sinus pain and sore throat.    Eyes:  Negative for pain and discharge.   Respiratory:  Negative for cough, chest tightness, shortness of breath and wheezing.    Cardiovascular:  Negative for chest pain and palpitations.   Gastrointestinal:  Negative for abdominal pain, diarrhea, nausea and vomiting.   Endocrine: Negative for cold intolerance and heat intolerance.   Genitourinary:  Negative for dysuria, flank pain, frequency and urgency.   Musculoskeletal:  Negative for joint swelling.   Skin:  Negative for rash.   Neurological:  Negative for dizziness, syncope and headaches.   Psychiatric/Behavioral:  Negative for confusion and hallucinations.          Objective:   Physical Exam  Constitutional:       General: He is not in acute distress.     Appearance: Normal appearance. He is obese. He is not ill-appearing or toxic-appearing.   HENT:      Head: Normocephalic and atraumatic.      Right Ear: Tympanic membrane and ear canal normal.      Left Ear: Tympanic membrane and ear canal normal.      Mouth/Throat:      Mouth: Mucous membranes are moist.      Pharynx: Oropharynx is clear. No oropharyngeal exudate or posterior oropharyngeal erythema.   Eyes:      Extraocular Movements: Extraocular movements  intact.      Pupils: Pupils are equal, round, and reactive to light.   Cardiovascular:      Rate and Rhythm: Normal rate and regular rhythm.      Heart sounds: Normal heart sounds. No murmur heard.     No gallop.   Pulmonary:      Effort: Pulmonary effort is normal. No respiratory distress.      Breath sounds: Normal breath sounds. No stridor. No wheezing, rhonchi or rales.   Abdominal:      General: Bowel sounds are normal.      Palpations: Abdomen is soft.      Tenderness: There is no abdominal tenderness. There is no right CVA tenderness, left CVA tenderness or guarding.   Musculoskeletal:         General: No swelling.      Cervical back: Normal range of motion and neck supple. No rigidity or tenderness.      Right lower leg: No edema.      Left lower leg: No edema.      Comments: Right barefoot skin diabetic exam is abnormal with diabetic monofilament/sensation testing abnormal.   Skin:     General: Skin is warm and dry.   Neurological:      General: No focal deficit present.      Mental Status: He is alert and oriented to person, place, and time. Mental status is at baseline.      Cranial Nerves: No cranial nerve deficit.      Sensory: No sensory deficit.      Motor: Motor function is intact. No weakness.      Gait: Gait normal.   Psychiatric:         Mood and Affect: Mood normal.         Behavior: Behavior normal.         Thought Content: Thought content normal.         Judgment: Judgment normal.         /62 (BP Location: Right arm, Patient Position: Sitting, Cuff Size: adult)   Pulse 75   Temp 97.7 °F (36.5 °C) (Temporal)   Resp 16   Ht 5' 8\" (1.727 m)   Wt 211 lb 3.2 oz (95.8 kg)   SpO2 99%   BMI 32.11 kg/m²  Estimated body mass index is 32.11 kg/m² as calculated from the following:    Height as of this encounter: 5' 8\" (1.727 m).    Weight as of this encounter: 211 lb 3.2 oz (95.8 kg).    Medicare Hearing Assessment:   Hearing Screening    Time taken: 5/27/2025  9:08 AM  Screening Method:  Finger Rub         Visual Acuity:   Right Eye Visual Acuity: Corrected Right Eye Chart Acuity: 20/40   Left Eye Visual Acuity: Corrected Left Eye Chart Acuity: 20/20   Both Eyes Visual Acuity: Corrected Both Eyes Chart Acuity: 20/20   Able To Tolerate Visual Acuity: Yes        Assessment & Plan:   Steve Kitchen is a 70 year old male who presents for a Medicare Assessment.     1. Encounter for annual health examination (Primary)  -Healthy diet and lifestyle.  -Weight loss.  -Exercise as tolerated.  -Dental exam every 6 months or as recommended by dentist.  -Eye exams annually or as recommended by specialist.  -     CBC, Platelet; No Differential; Future; Expected date: 05/27/2025  -     Comp Metabolic Panel (14); Future; Expected date: 05/27/2025  -     Hemoglobin A1C; Future; Expected date: 05/27/2025  -     Lipid Panel; Future; Expected date: 05/27/2025  -     TSH W Reflex To Free T4; Future; Expected date: 05/27/2025  2. Coronary artery disease involving native coronary artery of native heart without angina pectoris  On rosuvastatin, aspirin and Plavix.  -Continue follow-up with cardiology.  -     Lipid Panel; Future; Expected date: 05/27/2025  -     Metoprolol Succinate ER; Take 1 tablet (25 mg total) by mouth daily.  Dispense: 90 tablet; Refill: 1  -     Rosuvastatin Calcium; Take 1 tablet (20 mg total) by mouth nightly.  Dispense: 90 tablet; Refill: 1  3. Status post coronary artery stent placement  -Continue follow-up with cardiology.  -     Lipid Panel; Future; Expected date: 05/27/2025  -     Metoprolol Succinate ER; Take 1 tablet (25 mg total) by mouth daily.  Dispense: 90 tablet; Refill: 1  -     Rosuvastatin Calcium; Take 1 tablet (20 mg total) by mouth nightly.  Dispense: 90 tablet; Refill: 1  4. Essential hypertension  Stable.  CPM.  -     Comp Metabolic Panel (14); Future; Expected date: 05/27/2025  -     amLODIPine Besy-Benazepril HCl; Take 1 capsule by mouth daily.  Dispense: 90 capsule;  Refill: 1  -     Metoprolol Succinate ER; Take 1 tablet (25 mg total) by mouth daily.  Dispense: 90 tablet; Refill: 1  5. Dyslipidemia  Tolerating statin well.  CPM.  -     Lipid Panel; Future; Expected date: 05/27/2025  -     Fenofibrate; Take 1 tablet (145 mg total) by mouth daily.  Dispense: 90 tablet; Refill: 1  -     Rosuvastatin Calcium; Take 1 tablet (20 mg total) by mouth nightly.  Dispense: 90 tablet; Refill: 1  6. Type 2 diabetes mellitus with diabetic mononeuropathy, without long-term current use of insulin (HCC)  Last A1c value was 5.7% done 11/5/2024.  CPM pending results.  -     Comp Metabolic Panel (14); Future; Expected date: 05/27/2025  -     Hemoglobin A1C; Future; Expected date: 05/27/2025  -     Microalb/Creat Ratio, Random Urine; Future; Expected date: 05/27/2025  -     glipiZIDE ER; Take 1 tablet (5 mg total) by mouth daily.  Dispense: 90 tablet; Refill: 1  -     metFORMIN HCl; Take 1 tablet (1,000 mg total) by mouth 2 (two) times daily with meals.  Dispense: 180 tablet; Refill: 1  -     Ozempic (1 MG/DOSE); Inject 1 mg into the skin once a week.  Dispense: 3 each; Refill: 1  7. FRANCY on CPAP  Stable.  CPM.  8. Anxiety  Stable.  No SHIP.  CPM.  -     buPROPion HCl ER (SR); Take 1 tablet (200 mg total) by mouth 2 (two) times daily.  Dispense: 180 tablet; Refill: 1  9. Depression, unspecified depression type  Stable.  No SHIP.  CPM.  -     buPROPion HCl ER (SR); Take 1 tablet (200 mg total) by mouth 2 (two) times daily.  Dispense: 180 tablet; Refill: 1  10. Obesity, Class I, BMI 30-34.9  -Healthy diet and lifestyle  -Weight loss  -     Hemoglobin A1C; Future; Expected date: 05/27/2025  -     Lipid Panel; Future; Expected date: 05/27/2025  11. Nondependent alcohol abuse, in remission  Stable.  Offers to be a Victor for alcoholics if need be.  12. Gastroesophageal reflux disease without esophagitis  -     Famotidine; Take 1 tablet (20 mg total) by mouth 2 (two) times daily.  Dispense: 180 tablet;  Refill: 1  13. Esophageal thickening  2021 biopsies:  A. Stomach biopsy:  Fragments of gastric mucosa with mild reactive surface epithelial changes.  Negative for helicobacter pylori-like organisms.  B. Esophageal biopsy:  Fragments of junctional mucosa with intestinal metaplasia.  Negative for dysplasia.  -Repeat endoscopy in 2026  -     Famotidine; Take 1 tablet (20 mg total) by mouth 2 (two) times daily.  Dispense: 180 tablet; Refill: 1  14. Blanchard's esophagus without dysplasia  As above.  -Repeat endoscopy in 2026  15. Tubular adenoma of colon  -Repeat colonoscopy in 2028  16. Diverticulosis  -Fiber rich diet.  17. Immunization counseling  Patient will check records about shingles vaccination.  18. History of kidney stones  On hydrochlorothiazide through urology.              The patient indicates understanding of these issues and agrees to the plan.  Lab work ordered.  Reinforced healthy diet, lifestyle, and exercise.      Return in about 6 months (around 11/27/2025).     Esteban Michelle MD, 5/27/2025     Supplementary Documentation:   General Health:  In the past six months, have you lost more than 10 pounds without trying?: (Patient-Rptd) 2 - No  Has your appetite been poor?: (Patient-Rptd) No  Type of Diet: (Patient-Rptd) Balanced  How does the patient maintain a good energy level?: (Patient-Rptd) Appropriate Exercise  How would you describe your daily physical activity?: (Patient-Rptd) Moderate  How would you describe your current health state?: (Patient-Rptd) Good  How do you maintain positive mental well-being?: (Patient-Rptd) Social Interaction, Visiting Friends, Visiting Family  On a scale of 0 to 10, with 0 being no pain and 10 being severe pain, what is your pain level?: (Patient-Rptd) 1 - (Mild)  In the past six months, have you experienced urine leakage?: (Patient-Rptd) 1-Yes  At any time do you feel concerned for the safety/well-being of yourself and/or your children, in your home or elsewhere?:  (Patient-Rptd) No  Have you had any immunizations at another office such as Influenza, Hepatitis B, Tetanus, or Pneumococcal?: (Patient-Rptd) Yes    Health Maintenance   Topic Date Due    Zoster Vaccines (1 of 2) Never done    Annual Depression Screening  01/01/2025    Diabetes Care: Microalb/Creat Ratio (Annual)  01/01/2025    COVID-19 Vaccine (8 - 2024-25 season) 04/08/2025    Diabetes Care Foot Exam  04/23/2025    Annual Physical  04/23/2025    Diabetes Care A1C  05/05/2025    Diabetes Care Dilated Eye Exam  10/28/2025    Diabetes Care: GFR  12/02/2025    PSA  10/31/2026    Pneumococcal Vaccine: 50+ Years (3 of 3 - PCV20 or PCV21) 03/01/2027    Colorectal Cancer Screening  07/20/2028    Influenza Vaccine  Completed    Fall Risk Screening (Annual)  Completed    Meningococcal B Vaccine  Aged Out            [1]   Patient Active Problem List  Diagnosis    Gastroesophageal reflux disease    Hyperlipidemia    Type 2 diabetes mellitus without complication (HCC)    Essential hypertension    Obesity    Disorder of upper respiratory system    Actinic keratosis    Anxiety    Chronic ischemic heart disease    Dysfunction of eustachian tube    Long term current use of aspirin    Nondependent alcohol abuse, in remission    Perforation of tympanic membrane    Polyp of colon    FRANCY on CPAP    Depression    Coronary artery disease involving native coronary artery    Pure hypercholesterolemia    Cardiac syndrome X    History of kidney stones    Sensorineural hearing loss (SNHL) of both ears    Abnormal stress test    Asthma (HCC)    Unstable angina (HCC)    Obstructive sleep apnea    Otorrhea   [2]   Outpatient Medications Marked as Taking for the 5/27/25 encounter (Office Visit) with Esteban Michelle MD   Medication Sig    amLODIPine Besy-Benazepril HCl 10-40 MG Oral Cap Take 1 capsule by mouth daily.    buPROPion HCl ER, SR, 200 MG Oral Tablet 12 Hr Take 1 tablet (200 mg total) by mouth 2 (two) times daily.    famotidine 20 MG  Oral Tab Take 1 tablet (20 mg total) by mouth 2 (two) times daily.    fenofibrate 145 MG Oral Tab Take 1 tablet (145 mg total) by mouth daily.    glipiZIDE ER 5 MG Oral Tablet 24 Hr Take 1 tablet (5 mg total) by mouth daily.    metFORMIN HCl 1000 MG Oral Tab Take 1 tablet (1,000 mg total) by mouth 2 (two) times daily with meals.    metoprolol succinate ER 25 MG Oral Tablet 24 Hr Take 1 tablet (25 mg total) by mouth daily.    rosuvastatin 20 MG Oral Tab Take 1 tablet (20 mg total) by mouth nightly.    semaglutide (OZEMPIC, 1 MG/DOSE,) 4 MG/3ML Subcutaneous Solution Pen-injector Inject 1 mg into the skin once a week.    albuterol 108 (90 Base) MCG/ACT Inhalation Aero Soln Inhale 2 puffs into the lungs every 4 (four) hours as needed for Wheezing.    clopidogrel 75 MG Oral Tab Take 1 tablet (75 mg total) by mouth daily.    Coenzyme Q10 100 MG Oral Cap Take 100 mg by mouth daily.    SODIUM FLUORIDE 5000 PPM 1.1 % Dental Paste USE AS TOOTHPASTE 1-2 TIMES DAILY. DO NOT DRINK OR EAT FOR 30 MINUTES AFTER BRUSHING. DO NOT SWALLOW    hydroCHLOROthiazide 25 MG Oral Tab Take 1 tablet (25 mg total) by mouth daily.    Misc Natural Products (GLUCOSAMINE CHOND CMP ADVANCED) Oral Tab Take by mouth at bedtime.    Multiple Vitamins-Minerals (CENTRUM SILVER 50+MEN) Oral Tab Take by mouth at bedtime.    B Complex Vitamins (VITAMIN B COMPLEX) Oral Tab Take by mouth every morning.    Ascorbic Acid (VITAMIN C) 1000 MG Oral Tab Take 1 tablet (1,000 mg total) by mouth daily.    ferrous sulfate 325 (65 FE) MG Oral Tab EC Take 1 tablet (325 mg total) by mouth at bedtime.    aspirin 81 MG Oral Tab Take 1 tablet (81 mg total) by mouth daily.

## 2025-05-30 ENCOUNTER — TELEPHONE (OUTPATIENT)
Dept: SURGERY | Facility: CLINIC | Age: 70
End: 2025-05-30

## 2025-05-30 RX ORDER — HYDROCHLOROTHIAZIDE 25 MG/1
25 TABLET ORAL DAILY
Qty: 90 TABLET | Refills: 3 | Status: SHIPPED | OUTPATIENT
Start: 2025-05-30

## 2025-05-30 NOTE — TELEPHONE ENCOUNTER
- received fax from [facility] pharmacy for refill of hydrochlorothiazide 25mg for pt  - protocol met, refill sent  Pt LOV 12/9/24  Recommend:  - Continue on hydrochlorothiazide 25 mg daily.  - Follow-up otherwise in 1 year.

## 2025-06-01 DIAGNOSIS — E78.5 DYSLIPIDEMIA: ICD-10-CM

## 2025-06-01 DIAGNOSIS — Z95.5 STATUS POST CORONARY ARTERY STENT PLACEMENT: ICD-10-CM

## 2025-06-01 DIAGNOSIS — I25.10 CORONARY ARTERY DISEASE INVOLVING NATIVE CORONARY ARTERY OF NATIVE HEART WITHOUT ANGINA PECTORIS: ICD-10-CM

## 2025-06-02 RX ORDER — ROSUVASTATIN CALCIUM 20 MG/1
20 TABLET, COATED ORAL NIGHTLY
Qty: 90 TABLET | Refills: 1 | OUTPATIENT
Start: 2025-06-02

## (undated) DIAGNOSIS — E78.2 MIXED HYPERLIPIDEMIA: ICD-10-CM

## (undated) DIAGNOSIS — R35.0 URINARY FREQUENCY: Primary | ICD-10-CM

## (undated) DIAGNOSIS — R39.15 URINARY URGENCY: ICD-10-CM

## (undated) DIAGNOSIS — N20.1 RIGHT URETERAL STONE: ICD-10-CM

## (undated) DEVICE — ENCORE® LATEX ACCLAIM SIZE 8, STERILE LATEX POWDER-FREE SURGICAL GLOVE: Brand: ENCORE

## (undated) DEVICE — SOLUTION  .9 3000ML

## (undated) DEVICE — DUAL LUMEN URETERAL CATHETER

## (undated) DEVICE — SOLO FLEX HYBRID GUIDEWIRE .03

## (undated) DEVICE — UROLOGY DRAIN BAG

## (undated) DEVICE — TIGERTAIL 6F FLXTIP 70CM

## (undated) DEVICE — CYSTO PACK: Brand: MEDLINE INDUSTRIES, INC.

## (undated) DEVICE — TOWEL SURG OR 17X30IN BLUE

## (undated) DEVICE — ENDOSCOPIC VALVE WITH ADAPTER.: Brand: SURSEAL® II

## (undated) DEVICE — CATH URET CONE TIP 8FR 138008

## (undated) DEVICE — PAD EYE OVAL LG

## (undated) DEVICE — PUMP SAPS 1  ACT 1 WY VLV

## (undated) DEVICE — Device

## (undated) DEVICE — MOSES 200 FIBER

## (undated) DEVICE — WATER STERILE AQUALITE 1000ML

## (undated) DEVICE — STERILE SURGICAL LUBRICANT, METAL TUBE: Brand: SURGILUBE

## (undated) DEVICE — VIAL ISOVUE 300 10X100ML

## (undated) DEVICE — SLEEVE KENDALL SCD EXPRESS MED

## (undated) DEVICE — DILATOR/SHEATH SET: Brand: 8/10 DILATOR/SHEATH SET

## (undated) DEVICE — TECH FEE

## (undated) DEVICE — SOLUTION  .9 1000ML BTL

## (undated) DEVICE — SINGLE-USE DIGITAL FLEXIBLE URETEROSCOPE: Brand: LITHOVUE

## (undated) DEVICE — GAMMEX® PI HYBRID SIZE 7, STERILE POWDER-FREE SURGICAL GLOVE, POLYISOPRENE AND NEOPRENE BLEND: Brand: GAMMEX

## (undated) NOTE — LETTER
07/17/20        Ana [address] [address]      Dear [facility],    [address] records indicate that you have outstanding lab work and or testing that was ordered for you and has not yet been completed:  Orders Placed This Encoun

## (undated) NOTE — MR AVS SNAPSHOT
After Visit Summary   8/22/2023    Jose Armando Amezcua   MRN: [id number]           Visit Information     Date & Time  8/22/2023  1:00 PM Provider  Jojo Joshi MD Department  [address], [address] [facility],[address], [facility]. Phone  [phone]      Allergies as of 8/22/2023  Review status set to Review Complete on 8/22/2023       Noted Reaction Type Reactions    Dander 04/01/1972    ASTHMA, Coughing    Mixed Grasses 04/01/1972    ASTHMA, Coughing    Molds & Smuts 04/01/1972    ASTHMA, Coughing    Tree, Elm 04/01/1972    ASTHMA, Coughing    Seasonal 11/02/2020    Coughing    Itchy eyes      Your Current Medications        Dosage    hydroCHLOROthiazide 12.5 MG Oral Cap Take 1 capsule (12.5 mg total) by mouth daily. rosuvastatin 20 MG Oral Tab Take 1 tablet (20 mg total) by mouth nightly. OZEMPIC, 1 MG/DOSE, 4 MG/3ML Subcutaneous Solution Pen-injector Inject 1 mg into the skin once a week. ipratropium 17 MCG/ACT Inhalation Aero Soln Inhale 2 puffs into the lungs 3 (three) times daily. albuterol (PROAIR HFA) 108 (90 Base) MCG/ACT Inhalation Aero Soln Inhale 2 puffs into the lungs every 4 (four) hours as needed for Wheezing. amLODIPine Besy-Benazepril HCl 10-40 MG Oral Cap Take 1 capsule by mouth daily. metoprolol succinate ER 25 MG Oral Tablet 24 Hr Take 1 tablet (25 mg total) by mouth daily. glipiZIDE ER 5 MG Oral Tablet 24 Hr Take 1 tablet (5 mg total) by mouth daily. metFORMIN HCl 1000 MG Oral Tab Take 1 tablet (1,000 mg total) by mouth 2 (two) times daily with meals. famotidine 20 MG Oral Tab Take 1 tablet (20 mg total) by mouth 2 (two) times daily. buPROPion HCl ER, SR, 200 MG Oral Tablet 12 Hr Take 1 tablet (200 mg total) by mouth 2 (two) times daily. fenofibrate 145 MG Oral Tab Take 1 tablet (145 mg total) by mouth daily. Misc Natural Products (GLUCOSAMINE CHOND CMP ADVANCED) Oral Tab Take by mouth at bedtime.     Multiple Vitamins-Minerals (CENTRUM SILVER 50+MEN) Oral Tab Take by mouth at bedtime. B Complex Vitamins (VITAMIN B COMPLEX) Oral Tab Take by mouth every morning. Ascorbic Acid (VITAMIN C) 1000 MG Oral Tab Take 1 tablet (1,000 mg total) by mouth daily. ferrous sulfate 325 (65 FE) MG Oral Tab EC Take 1 tablet (325 mg total) by mouth at bedtime. cholecalciferol 50 MCG (2000 UT) Oral Tab Take 1 tablet (2,000 Units total) by mouth at bedtime. Semaglutide, 1 MG/DOSE, (OZEMPIC, 1 MG/DOSE,) 2 MG/1.5ML Subcutaneous Solution Pen-injector Inject 1 mg into the skin once a week. aspirin 81 MG Oral Tab Take 1 tablet (81 mg total) by mouth daily. Diagnoses for This Visit    Kidney stone   [164245]  -  Primary           We Ordered the Following     Normal Orders This Visit    BASIC METABOLIC PANEL (8) [[id number] CUSTOM]     CALCULI, URINARY [[id number] CUSTOM]     SURGICAL PATHOLOGY TISSUE [[id number] CUSTOM]     Future Labs/Procedures Expected by Expires    CALCIUM, 24HR URINE [[id number] CUSTOM]  8/22/2023 8/22/2024    CREATININE U+CITRIC ACID U [[id number] CUSTOM]  8/22/2023 8/22/2024    OXALATE, Henreitta Macleod URINE [[id number] CUSTOM]  8/22/2023 8/22/2024    SODIUM, URINE, 24-HOUR [[id number] CUSTOM]  8/22/2023 8/22/2024    URIC ACID, URINE 24 HR [[id number] CUSTOM]  8/22/2023 8/22/2024    URINE PH 24 HR [[id number] CUSTOM]  8/22/2023 8/22/2024    URINE PHOSPHOROUS 24 HR [[id number] CUSTOM]  8/22/2023 8/22/2024    CALCULI, URINARY [[id number] CUSTOM]  8/23/2023 (Approximate) 8/23/2024      Future Appointments        Provider Department    9/25/2023 11:00 AM Lashawn, [facility], [address] [place]    12/11/2023 3:10 PM [facility], [address], [place]                Did you know that [facility] primary care physicians now offer Video Visits through [address] for adult patients for a variety of conditions such as allergies, back pain and cold symptoms?  Skip the drive and waiting room and online chat with a doctor face-to-face using your web-cam enabled computer or mobile device wherever you are. Video Visits cost $50 and can be paid hassle-free using a credit, debit, or health savings card. Not active on [employer]? Ask us how to get signed up today! If you receive a survey from [employer], please take a few minutes to complete it and provide feedback. We strive to deliver the best patient experience and are looking for ways to make improvements. Your feedback will help us do so. For more information on [employer], please visit [email or web address]           No text in SmartText           No text in SmartText

## (undated) NOTE — LETTER
01/18/20        Ana [address] [address]      Dear Adiel,    [address] [facility] records indicate that you have outstanding lab work and or testing that was ordered for you and has not yet been completed:  Orders Placed This Encoun

## (undated) NOTE — LETTER
Date: 12/5/2024    Patient Name: Melquiades Kitchen          To Whom it may concern:    This letter has been written at the patient's request. The above patient was seen at [facility] for treatment of a medical condition.    MELQUIADES may return to cardiac rehab given that he is currently on antibiotics for > 24 hours and symptoms improved with no fever for >24 hours without the use of fever reducing medication.        Sincerely,    Esteban Michelle MD